# Patient Record
Sex: FEMALE | Race: BLACK OR AFRICAN AMERICAN | NOT HISPANIC OR LATINO | Employment: FULL TIME | URBAN - METROPOLITAN AREA
[De-identification: names, ages, dates, MRNs, and addresses within clinical notes are randomized per-mention and may not be internally consistent; named-entity substitution may affect disease eponyms.]

---

## 2017-12-05 DIAGNOSIS — I10 ESSENTIAL HYPERTENSION: ICD-10-CM

## 2017-12-05 DIAGNOSIS — E11.9 TYPE 2 DIABETES MELLITUS TREATED WITHOUT INSULIN: ICD-10-CM

## 2017-12-05 RX ORDER — METFORMIN HYDROCHLORIDE 500 MG/1
TABLET ORAL
Qty: 180 TABLET | Refills: 2 | OUTPATIENT
Start: 2017-12-05

## 2017-12-05 RX ORDER — AMLODIPINE BESYLATE 10 MG/1
TABLET ORAL
Qty: 90 TABLET | Refills: 2 | OUTPATIENT
Start: 2017-12-05

## 2017-12-13 ENCOUNTER — PATIENT MESSAGE (OUTPATIENT)
Dept: ADMINISTRATIVE | Facility: HOSPITAL | Age: 67
End: 2017-12-13

## 2017-12-28 DIAGNOSIS — Z12.11 COLON CANCER SCREENING: ICD-10-CM

## 2018-01-30 ENCOUNTER — OFFICE VISIT (OUTPATIENT)
Dept: OBSTETRICS AND GYNECOLOGY | Facility: CLINIC | Age: 68
End: 2018-01-30
Payer: COMMERCIAL

## 2018-01-30 VITALS
DIASTOLIC BLOOD PRESSURE: 84 MMHG | BODY MASS INDEX: 35.2 KG/M2 | HEIGHT: 66 IN | SYSTOLIC BLOOD PRESSURE: 150 MMHG | WEIGHT: 219 LBS

## 2018-01-30 DIAGNOSIS — N95.0 POSTMENOPAUSAL BLEEDING: ICD-10-CM

## 2018-01-30 DIAGNOSIS — Z80.3 FHX: BREAST CANCER IN FIRST DEGREE RELATIVE: ICD-10-CM

## 2018-01-30 DIAGNOSIS — E11.9 TYPE 2 DIABETES MELLITUS WITHOUT COMPLICATION, WITHOUT LONG-TERM CURRENT USE OF INSULIN: ICD-10-CM

## 2018-01-30 DIAGNOSIS — Z01.419 ENCOUNTER FOR GYNECOLOGICAL EXAMINATION WITHOUT ABNORMAL FINDING: Primary | ICD-10-CM

## 2018-01-30 DIAGNOSIS — Z12.39 BREAST CANCER SCREENING: ICD-10-CM

## 2018-01-30 DIAGNOSIS — N76.3 CHRONIC VULVITIS: ICD-10-CM

## 2018-01-30 DIAGNOSIS — I10 HYPERTENSION, UNSPECIFIED TYPE: ICD-10-CM

## 2018-01-30 DIAGNOSIS — Z78.0 POSTMENOPAUSAL: ICD-10-CM

## 2018-01-30 PROCEDURE — 88175 CYTOPATH C/V AUTO FLUID REDO: CPT

## 2018-01-30 PROCEDURE — 3008F BODY MASS INDEX DOCD: CPT | Mod: S$GLB,,, | Performed by: OBSTETRICS & GYNECOLOGY

## 2018-01-30 PROCEDURE — 99387 INIT PM E/M NEW PAT 65+ YRS: CPT | Mod: S$GLB,,, | Performed by: OBSTETRICS & GYNECOLOGY

## 2018-01-30 PROCEDURE — 99999 PR PBB SHADOW E&M-EST. PATIENT-LVL III: CPT | Mod: PBBFAC,,, | Performed by: OBSTETRICS & GYNECOLOGY

## 2018-01-30 RX ORDER — ESTRADIOL 0.1 MG/G
1 CREAM VAGINAL
Qty: 42.5 G | Refills: 3 | Status: SHIPPED | OUTPATIENT
Start: 2018-02-01 | End: 2018-08-09

## 2018-01-30 RX ORDER — LEVOTHYROXINE SODIUM 100 UG/1
TABLET ORAL
COMMUNITY
Start: 2018-01-22 | End: 2018-08-09

## 2018-01-30 NOTE — PROGRESS NOTES
"CC: Well woman exam    Skylar Valdez is a 67 y.o. female  presents for a well woman exam.  LMP: No LMP recorded. Patient is postmenopausal..  She went through menopause at age 58 and was doing well.  She started to have vaginal bleeding.  She is having some cramping and discomfort.    She has pain with intercourse and some discomfort.    Daughter diagnosed with breast cancer at age 33.  She had a recurrence of breast cancer    Past Medical History:   Diagnosis Date    Diabetes mellitus, type 2     Hyperlipidemia     Hypertension      Past Surgical History:   Procedure Laterality Date    TONSILLECTOMY       Social History     Social History    Marital status:      Spouse name: N/A    Number of children: N/A    Years of education: N/A     Social History Main Topics    Smoking status: Never Smoker    Smokeless tobacco: Never Used    Alcohol use No    Drug use: No    Sexual activity: No     Other Topics Concern    None     Social History Narrative    None     Family History   Problem Relation Age of Onset    Hypertension Mother     Hyperlipidemia Mother     Stroke Father     Lung disease Father     Cancer Sister      OB History      Para Term  AB Living    0 0 0 0 0 0    SAB TAB Ectopic Multiple Live Births    0 0 0 0 0          BP (!) 150/84 (BP Location: Right arm, Patient Position: Sitting, BP Method: Large (Manual))   Ht 5' 6" (1.676 m)   Wt 99.3 kg (219 lb 0.4 oz)   BMI 35.35 kg/m²       ROS:    ROS:  GENERAL: Denies weight gain or weight loss. Feeling well overall.   SKIN: Denies rash or lesions.   HEAD: Denies head injury or headache.   NODES: Denies enlarged lymph nodes.   CHEST: Denies chest pain or shortness of breath.   CARDIOVASCULAR: Denies palpitations or left sided chest pain.   ABDOMEN: No abdominal pain, constipation, diarrhea, nausea, vomiting or rectal bleeding.   URINARY: No frequency, dysuria, hematuria, or burning on urination.  REPRODUCTIVE: See " HPI.   BREASTS: The patient performs breast self-examination and denies pain, lumps, or nipple discharge.   HEMATOLOGIC: No easy bruisability or excessive bleeding.   MUSCULOSKELETAL: Denies joint pain or swelling.   NEUROLOGIC: Denies syncope or weakness.   PSYCHIATRIC: Denies depression, anxiety or mood swings.    PHYSICAL EXAM:    APPEARANCE: Well nourished, well developed, in no acute distress.  AFFECT: WNL, alert and oriented x 3  SKIN: No acne or hirsutism  NECK: Neck symmetric without masses or thyromegaly  NODES: No inguinal, cervical, axillary, or femoral lymph node enlargement  CHEST: Good respiratory effect  ABDOMEN: Soft.  No tenderness or masses.  No hepatosplenomegaly.  No hernias.  BREASTS: Symmetrical, no skin changes or visible lesions.  No palpable masses, nipple discharge bilaterally.  PELVIC: Normal external genitalia without lesions.  Normal hair distribution.  Adequate perineal body, normal urethral meatus.  Vagina moist and well rugated without lesions or discharge.  Cervix pink, blood coming from the cervix,  without lesions, discharge or tenderness.  No significant cystocele or rectocele.  Bimanual exam shows uterus to be normal size, regular, mobile and nontender.  Adnexa without masses or tenderness.    RECTAL: Rectovaginal exam confirms above with normal sphincter tone, no masses.  EXTREMITIES: No edema.    Diagnosis    ICD-10-CM ICD-9-CM    1. Encounter for gynecological examination without abnormal finding Z01.419 V72.31 Liquid-based pap smear, screening   2. Postmenopausal Z78.0 V49.81 US Pelvis Comp with Transvag NON-OB (xpd   3. Postmenopausal bleeding N95.0 627.1 US Pelvis Comp with Transvag NON-OB (xpd   4. Chronic vulvitis N76.3 616.10    5. Breast cancer screening Z12.31 V76.10 Mammo Digital Screening Bilat with Tomosynthesis_CAD   6. FHx: breast cancer in first degree relative Z80.3 V16.3    7. Hypertension, unspecified type I10 401.9    8. Type 2 diabetes mellitus without  complication, without long-term current use of insulin E11.9 250.00      Will need EMBx after US  Concerns with DM , HTN and PMB  Discussed possible fibroids and other causes of abnormal bleeding    Discussed possible genetic testing for daughter .  Spent 45 min in   Patient was counseled today on A.C.S. Pap guidelines and recommendations for yearly pelvic exams, mammograms and monthly self breast exams; to see her PCP for other health maintenance.   F/U for US and appt with me after/ with EMBx

## 2018-02-06 ENCOUNTER — HOSPITAL ENCOUNTER (OUTPATIENT)
Dept: RADIOLOGY | Facility: HOSPITAL | Age: 68
Discharge: HOME | End: 2018-02-06
Attending: OBSTETRICS & GYNECOLOGY
Payer: COMMERCIAL

## 2018-02-06 DIAGNOSIS — N95.0 POSTMENOPAUSAL BLEEDING: ICD-10-CM

## 2018-02-06 DIAGNOSIS — Z12.39 BREAST CANCER SCREENING: ICD-10-CM

## 2018-02-06 DIAGNOSIS — Z78.0 POSTMENOPAUSAL: ICD-10-CM

## 2018-02-06 PROCEDURE — 77063 BREAST TOMOSYNTHESIS BI: CPT | Mod: 26,,, | Performed by: RADIOLOGY

## 2018-02-06 PROCEDURE — 77067 SCR MAMMO BI INCL CAD: CPT | Mod: TC

## 2018-02-06 PROCEDURE — 77067 SCR MAMMO BI INCL CAD: CPT | Mod: 26,,, | Performed by: RADIOLOGY

## 2018-02-06 PROCEDURE — 76856 US EXAM PELVIC COMPLETE: CPT | Mod: 26,,, | Performed by: RADIOLOGY

## 2018-02-06 PROCEDURE — 76830 TRANSVAGINAL US NON-OB: CPT | Mod: TC

## 2018-02-06 PROCEDURE — 76830 TRANSVAGINAL US NON-OB: CPT | Mod: 26,,, | Performed by: RADIOLOGY

## 2018-02-07 ENCOUNTER — PATIENT MESSAGE (OUTPATIENT)
Dept: OBSTETRICS AND GYNECOLOGY | Facility: CLINIC | Age: 68
End: 2018-02-07

## 2018-02-07 ENCOUNTER — TELEPHONE (OUTPATIENT)
Dept: OBSTETRICS AND GYNECOLOGY | Facility: CLINIC | Age: 68
End: 2018-02-07

## 2018-02-07 NOTE — TELEPHONE ENCOUNTER
Possible polyp in the lining of the uterus seen on US.  Please come in for follow up to discuss options and removal.

## 2018-02-26 ENCOUNTER — TELEPHONE (OUTPATIENT)
Dept: OBSTETRICS AND GYNECOLOGY | Facility: CLINIC | Age: 68
End: 2018-02-26

## 2018-02-26 NOTE — TELEPHONE ENCOUNTER
----- Message from Hafsa Helms sent at 2/26/2018  3:07 PM CST -----  x_  1st Request  _  2nd Request  _  3rd Request        Who:  mariah    Why: pt. Returning phone call. Please call to discuss    What Number to Call Back:[phone]    When to Expect a call back: (Before the end of the day)   -- if the call is after 12:00, the call back will be tomorrow.

## 2018-02-26 NOTE — TELEPHONE ENCOUNTER
----- Message from Hafsa Helms sent at 2/26/2018 11:28 AM CST -----  _x  1st Request  _  2nd Request  _  3rd Request        Who: mariah    Why: pt. Would like to speak with dr. milton porter. Please call to discuss    What Number to Call Back:[phone]    When to Expect a call back: (Before the end of the day)   -- if the call is after 12:00, the call back will be tomorrow.

## 2018-03-02 RX ORDER — LOSARTAN POTASSIUM 100 MG/1
TABLET ORAL DAILY
COMMUNITY
Start: 2018-02-25 | End: 2018-11-27 | Stop reason: SDUPTHER

## 2018-03-02 RX ORDER — LEVOTHYROXINE SODIUM 112 UG/1
TABLET ORAL
COMMUNITY
Start: 2018-02-25 | End: 2018-08-09

## 2018-03-06 ENCOUNTER — OFFICE VISIT (OUTPATIENT)
Dept: OBSTETRICS AND GYNECOLOGY | Facility: CLINIC | Age: 68
End: 2018-03-06
Payer: COMMERCIAL

## 2018-03-06 VITALS
HEIGHT: 66 IN | SYSTOLIC BLOOD PRESSURE: 128 MMHG | BODY MASS INDEX: 35.26 KG/M2 | DIASTOLIC BLOOD PRESSURE: 76 MMHG | WEIGHT: 219.38 LBS

## 2018-03-06 DIAGNOSIS — N95.0 POSTMENOPAUSAL BLEEDING: ICD-10-CM

## 2018-03-06 DIAGNOSIS — D25.0 FIBROIDS, SUBMUCOSAL: Primary | ICD-10-CM

## 2018-03-06 PROCEDURE — 3074F SYST BP LT 130 MM HG: CPT | Mod: S$GLB,,, | Performed by: OBSTETRICS & GYNECOLOGY

## 2018-03-06 PROCEDURE — 99999 PR PBB SHADOW E&M-EST. PATIENT-LVL III: CPT | Mod: PBBFAC,,, | Performed by: OBSTETRICS & GYNECOLOGY

## 2018-03-06 PROCEDURE — 3078F DIAST BP <80 MM HG: CPT | Mod: S$GLB,,, | Performed by: OBSTETRICS & GYNECOLOGY

## 2018-03-06 PROCEDURE — 99214 OFFICE O/P EST MOD 30 MIN: CPT | Mod: S$GLB,,, | Performed by: OBSTETRICS & GYNECOLOGY

## 2018-03-06 RX ORDER — LINAGLIPTIN 5 MG/1
TABLET, FILM COATED ORAL DAILY
COMMUNITY
Start: 2018-02-26 | End: 2018-12-04

## 2018-03-06 NOTE — PROGRESS NOTES
"CC: PMB  Submucosal fibroid  Pelvic pain    Skylar Valdez is a 67 y.o. female  presents for follow up of US and PMB and pelvic pain      The uterus  measures  11.0 x 7.8 x 7.6 cm .  There are at least 3 intramural uterine fibroids, the largest near the fundus measures 4.2 cm.  There is a small echogenic area within the endometrial cavity could represent a submucosal fibroid or a small polyp measuring 1.7 cm.  Fluid is seen also in the endometrium cavity.  Small echogenic debris noted in the endometrium cavity. The bilateral ovaries are not seen, the bilateral adnexa appear normal.  The endometrium does not appear thickened.   The ovaries are not seen, the adnexa appear normal.  No free fluid.   Impression       1.  Small isoechoic mass within the endometrial cavity could represent a small polyp or a submucosal fibroid.  The endometrium cavity is distended with fluid which appears to delineate normal thickness endometrium.  Small amount of debris noted in the fluid within the endometrial cavity.  Further evaluation advised.  Followup ultrasound recommended to ensure resolution.         Past Medical History:   Diagnosis Date    Diabetes mellitus, type 2     Hyperlipidemia     Hypertension        Past Surgical History:   Procedure Laterality Date    TONSILLECTOMY         OB History    Para Term  AB Living   2 2 2 0 0 2   SAB TAB Ectopic Multiple Live Births   0 0 0 0 0      # Outcome Date GA Lbr [place]/2nd Weight Sex Delivery Anes PTL Lv   2 Term            1 Term                   Family History   Problem Relation Age of Onset    Hypertension Mother     Hyperlipidemia Mother     Stroke Father     Lung disease Father     Cancer Sister     Breast cancer Daughter        Social History   Substance Use Topics    Smoking status: Never Smoker    Smokeless tobacco: Never Used    Alcohol use No       /76   Ht 5' 6" (1.676 m)   Wt 99.5 kg (219 lb 5.7 oz)   LMP 2010   BMI 35.41 kg/m² "     ROS:  GENERAL: Denies weight gain or weight loss. Feeling well overall.   SKIN: Denies rash or lesions.   HEAD: Denies head injury or headache.   NODES: Denies enlarged lymph nodes.   CHEST: Denies chest pain or shortness of breath.   CARDIOVASCULAR: Denies palpitations or left sided chest pain.   ABDOMEN: No abdominal pain, constipation, diarrhea, nausea, vomiting or rectal bleeding.   URINARY: No frequency, dysuria, hematuria, or burning on urination.  REPRODUCTIVE: See HPI.   BREASTS: The patient performs breast self-examination and denies pain, lumps, or nipple discharge.   HEMATOLOGIC: No easy bruisability or excessive bleeding.  MUSCULOSKELETAL: Denies joint pain or swelling.   NEUROLOGIC: Denies syncope or weakness.   PSYCHIATRIC: Denies depression, anxiety or mood swings.    Physical Exam:    APPEARANCE: Well nourished, well developed, in no acute distress.  AFFECT: WNL, alert and oriented x 3  SKIN: No acne or hirsutism  NECK: Neck symmetric without masses or thyromegaly  NODES: No inguinal, cervical, axillary, or femoral lymph node enlargement      ASSESSMENT AND PLAN  1. Fibroids, submucosal     2. Postmenopausal bleeding         Patient was counseled today on     Discussed fibroid treatment options- Possible observation,  Vs D & C HYST FIBROID RESECTION VS myomectomy vs HYST. \.   Risks and benefits of fibroid procedures discussed.   With the D & C  HYST - it will be diagnostic as well as therapeutic.  Does not want invasive measures at this time    F/U for D  & C HYST fibroid resection  Spent 25 min in   Answers for HPI/ROS submitted by the patient on 3/5/2018   Vaginal bleeding  Chronicity: recurrent  Onset: 1 to 4 weeks ago  Progression since onset: waxing and waning  Pain severity: moderate  Affected side: both  Pregnant now?: No  abdominal pain: Yes  anorexia: No  back pain: Yes  chills: No  constipation: No  diarrhea: No  discolored urine: Yes  dysuria: No  fever: No  flank pain:  No  frequency: No  headaches: No  hematuria: Yes  nausea: No  painful intercourse: No  rash: No  urgency: Yes  vomiting: No  Vaginal bleeding: typical of menses  Passing clots?: No  Passing tissue?: No  Aggravated by: tactile pressure  treatments tried: nothing  Improvement on treatment: no relief  Sexual activity: not sexually active  Partner with STD symptoms: no  Birth control: nothing  Menstrual history: postmenopausal  STD: No  abdominal surgery: No   section: No  Ectopic pregnancy: No  Endometriosis: No  herpes simplex: No  gynecological surgery: No  menorrhagia: No  metrorrhagia: No  miscarriage: No  ovarian cysts: No  perineal abscess: No  PID: No  terminated pregnancy: No  vaginosis: No

## 2018-04-27 ENCOUNTER — PATIENT MESSAGE (OUTPATIENT)
Dept: INTERNAL MEDICINE | Facility: CLINIC | Age: 68
End: 2018-04-27

## 2018-07-03 ENCOUNTER — PATIENT OUTREACH (OUTPATIENT)
Dept: INTERNAL MEDICINE | Facility: CLINIC | Age: 68
End: 2018-07-03

## 2018-08-09 ENCOUNTER — OFFICE VISIT (OUTPATIENT)
Dept: OBSTETRICS AND GYNECOLOGY | Facility: CLINIC | Age: 68
End: 2018-08-09
Attending: OBSTETRICS & GYNECOLOGY
Payer: COMMERCIAL

## 2018-08-09 VITALS
SYSTOLIC BLOOD PRESSURE: 150 MMHG | BODY MASS INDEX: 31.74 KG/M2 | WEIGHT: 209.44 LBS | DIASTOLIC BLOOD PRESSURE: 80 MMHG | HEIGHT: 68 IN

## 2018-08-09 DIAGNOSIS — N95.0 PMB (POSTMENOPAUSAL BLEEDING): Primary | ICD-10-CM

## 2018-08-09 PROCEDURE — 99214 OFFICE O/P EST MOD 30 MIN: CPT | Mod: S$GLB,,, | Performed by: OBSTETRICS & GYNECOLOGY

## 2018-08-09 PROCEDURE — 3079F DIAST BP 80-89 MM HG: CPT | Mod: CPTII,S$GLB,, | Performed by: OBSTETRICS & GYNECOLOGY

## 2018-08-09 PROCEDURE — 3077F SYST BP >= 140 MM HG: CPT | Mod: CPTII,S$GLB,, | Performed by: OBSTETRICS & GYNECOLOGY

## 2018-08-09 PROCEDURE — 99999 PR PBB SHADOW E&M-EST. PATIENT-LVL III: CPT | Mod: PBBFAC,,, | Performed by: OBSTETRICS & GYNECOLOGY

## 2018-08-09 RX ORDER — LEVOTHYROXINE SODIUM 125 UG/1
TABLET ORAL
COMMUNITY
Start: 2018-07-31 | End: 2018-12-13 | Stop reason: SDUPTHER

## 2018-08-09 RX ORDER — SODIUM CHLORIDE 9 MG/ML
INJECTION, SOLUTION INTRAVENOUS CONTINUOUS
Status: CANCELLED | OUTPATIENT
Start: 2018-08-09

## 2018-08-09 NOTE — PROGRESS NOTES
CC: Postmenopausal bleeding    Skylar Valdez is a 68 y.o. female  presents for complaint of postmenopausal bleeding.      She reports menopause at 58 years old. She reports she has bleeding monthly for the last year.  She bleeds for 5 days.  She reports the bleeding is heavy.      Ultrasound shows:    The uterus  measures  11.0 x 7.8 x 7.6 cm .  There are at least 3 intramural uterine fibroids, the largest near the fundus measures 4.2 cm.  There is a small echogenic area within the endometrial cavity could represent a submucosal fibroid or a small polyp measuring 1.7 cm.  Fluid is seen also in the endometrium cavity.  Small echogenic debris noted in the endometrium cavity. The bilateral ovaries are not seen, the bilateral adnexa appear normal.  The endometrium does not appear thickened.   The ovaries are not seen, the adnexa appear normal.  No free fluid.      Impression       1.  Small isoechoic mass within the endometrial cavity could represent a small polyp or a submucosal fibroid.  The endometrium cavity is distended with fluid which appears to delineate normal thickness endometrium.  Small amount of debris noted in the fluid within the endometrial cavity.  Further evaluation advised.  Followup ultrasound recommended to ensure resolution.            Past Medical History:   Diagnosis Date    Diabetes mellitus, type 2     Hyperlipidemia     Hypertension        Past Surgical History:   Procedure Laterality Date    TONSILLECTOMY         OB History    Para Term  AB Living   2 2 2 0 0 2   SAB TAB Ectopic Multiple Live Births   0 0 0 0 0      # Outcome Date GA Lbr [place]/2nd Weight Sex Delivery Anes PTL Lv   2 Term      Vag-Spont      1 Term      Vag-Spont             Family History   Problem Relation Age of Onset    Hypertension Mother     Hyperlipidemia Mother     Stroke Father     Lung disease Father     Cancer Sister     Breast cancer Daughter     Ovarian cancer Neg Hx     Colon cancer  "Neg Hx        Social History   Substance Use Topics    Smoking status: Never Smoker    Smokeless tobacco: Never Used    Alcohol use No       BP (!) 150/80   Ht 5' 8" (1.727 m)   Wt 95 kg (209 lb 7 oz)   LMP 06/01/2010   BMI 31.84 kg/m²     ROS:  GENERAL: Denies weight gain or weight loss. Feeling well overall.   SKIN: Denies rash or lesions.   HEAD: Denies head injury or headache.   NODES: Denies enlarged lymph nodes.   CHEST: Denies chest pain or shortness of breath.   CARDIOVASCULAR: Denies palpitations or left sided chest pain.   ABDOMEN: No abdominal pain, constipation, diarrhea, nausea, vomiting or rectal bleeding.   URINARY: No frequency, dysuria, hematuria, or burning on urination.  REPRODUCTIVE: See HPI.   BREASTS: The patient performs breast self-examination and denies pain, lumps, or nipple discharge.   HEMATOLOGIC: No easy bruisability or excessive bleeding.  MUSCULOSKELETAL: Denies joint pain or swelling.   NEUROLOGIC: Denies syncope or weakness.   PSYCHIATRIC: Denies depression, anxiety or mood swings.    Physical Exam:    APPEARANCE: Well nourished, well developed, in no acute distress.  AFFECT: WNL, alert and oriented x 3  SKIN: No acne or hirsutism  NECK: Neck symmetric without masses or thyromegaly  NODES: No inguinal, cervical, axillary, or femoral lymph node enlargement  CHEST: Good respiratory effect  ABDOMEN: Soft.  No tenderness or masses.  No hepatosplenomegaly.  No hernias.  BREASTS: Symmetrical, no skin changes or visible lesions.  No palpable masses, nipple discharge bilaterally.  PELVIC: Normal external genitalia without lesions.  Normal hair distribution.  Adequate perineal body, normal urethral meatus.  Vagina moist and well rugated without lesions or discharge.  Cervix pink, without lesions, discharge or tenderness.  No significant cystocele or rectocele.  Bimanual exam shows uterus to be normal size but difficult to assess due to body habitus, regular, mobile and nontender.  " Adnexa without masses or tenderness.    EXTREMITIES: No edema.    ASSESSMENT AND PLAN  1. PMB (postmenopausal bleeding)  Vital signs    Notify Physician/Vital Signs Parameters Urine output less than 0.5mL/kg/hr (with indwelling catheter) or 30 mL/hr (without indwelling catheter) or blood glucose greater than 200 mg/dL    Notify physician    Notify Physician - Potential Need of Opioid Reversal    Chlorohexidine Gluconate Bath    Case Request Operating Room: HYSTEROSCOPY, WITH DILATION AND CURETTAGE OF UTERUS    Place in Outpatient    Diet NPO    Place sequential compression device    Notify Physician - Potential Need of Opioid Reversal       Patient was counseled today on possible causes of PMB and treatment options - endometrial biopsy but would most likely continue to have bleeding due to polyp; D&C hysteroscopy; endometrial biopsy and hysterectomy if biopsy is negative.  Patient desires to proceed with D&C hysteroscopy.  Surgery scheduled 9/18    Follow-up in about 2 weeks (around 8/23/2018).

## 2018-09-17 ENCOUNTER — HOSPITAL ENCOUNTER (OUTPATIENT)
Dept: PREADMISSION TESTING | Facility: OTHER | Age: 68
Discharge: HOME | End: 2018-09-17
Attending: OBSTETRICS & GYNECOLOGY
Payer: COMMERCIAL

## 2018-09-17 ENCOUNTER — ANESTHESIA EVENT (OUTPATIENT)
Dept: SURGERY | Facility: OTHER | Age: 68
End: 2018-09-17
Payer: COMMERCIAL

## 2018-09-17 ENCOUNTER — OFFICE VISIT (OUTPATIENT)
Dept: OBSTETRICS AND GYNECOLOGY | Facility: CLINIC | Age: 68
End: 2018-09-17
Attending: OBSTETRICS & GYNECOLOGY
Payer: COMMERCIAL

## 2018-09-17 VITALS
HEIGHT: 67 IN | DIASTOLIC BLOOD PRESSURE: 65 MMHG | WEIGHT: 205 LBS | OXYGEN SATURATION: 98 % | SYSTOLIC BLOOD PRESSURE: 147 MMHG | BODY MASS INDEX: 32.18 KG/M2 | HEART RATE: 80 BPM | TEMPERATURE: 97 F

## 2018-09-17 VITALS
DIASTOLIC BLOOD PRESSURE: 80 MMHG | WEIGHT: 209.44 LBS | BODY MASS INDEX: 31.74 KG/M2 | HEIGHT: 68 IN | SYSTOLIC BLOOD PRESSURE: 140 MMHG

## 2018-09-17 DIAGNOSIS — N95.0 PMB (POSTMENOPAUSAL BLEEDING): ICD-10-CM

## 2018-09-17 DIAGNOSIS — Z01.818 PREOPERATIVE EXAM FOR GYNECOLOGIC SURGERY: Primary | ICD-10-CM

## 2018-09-17 LAB
ANION GAP SERPL CALC-SCNC: 8 MMOL/L
BASOPHILS # BLD AUTO: 0.02 K/UL
BASOPHILS NFR BLD: 0.4 %
BUN SERPL-MCNC: 10 MG/DL
CALCIUM SERPL-MCNC: 9.7 MG/DL
CHLORIDE SERPL-SCNC: 106 MMOL/L
CO2 SERPL-SCNC: 27 MMOL/L
CREAT SERPL-MCNC: 0.7 MG/DL
DIFFERENTIAL METHOD: ABNORMAL
EOSINOPHIL # BLD AUTO: 0.3 K/UL
EOSINOPHIL NFR BLD: 5.1 %
ERYTHROCYTE [DISTWIDTH] IN BLOOD BY AUTOMATED COUNT: 13.4 %
EST. GFR  (AFRICAN AMERICAN): >60 ML/MIN/1.73 M^2
EST. GFR  (NON AFRICAN AMERICAN): >60 ML/MIN/1.73 M^2
GLUCOSE SERPL-MCNC: 104 MG/DL
HCT VFR BLD AUTO: 42.1 %
HGB BLD-MCNC: 13.4 G/DL
LYMPHOCYTES # BLD AUTO: 1.8 K/UL
LYMPHOCYTES NFR BLD: 36.3 %
MCH RBC QN AUTO: 27.1 PG
MCHC RBC AUTO-ENTMCNC: 31.8 G/DL
MCV RBC AUTO: 85 FL
MONOCYTES # BLD AUTO: 0.5 K/UL
MONOCYTES NFR BLD: 10.7 %
NEUTROPHILS # BLD AUTO: 2.3 K/UL
NEUTROPHILS NFR BLD: 47.5 %
PLATELET # BLD AUTO: 267 K/UL
PMV BLD AUTO: 10.6 FL
POTASSIUM SERPL-SCNC: 4 MMOL/L
RBC # BLD AUTO: 4.95 M/UL
SODIUM SERPL-SCNC: 141 MMOL/L
WBC # BLD AUTO: 4.88 K/UL

## 2018-09-17 PROCEDURE — 99999 PR PBB SHADOW E&M-EST. PATIENT-LVL II: CPT | Mod: PBBFAC,,, | Performed by: OBSTETRICS & GYNECOLOGY

## 2018-09-17 PROCEDURE — 80048 BASIC METABOLIC PNL TOTAL CA: CPT

## 2018-09-17 PROCEDURE — 36415 COLL VENOUS BLD VENIPUNCTURE: CPT

## 2018-09-17 PROCEDURE — 85025 COMPLETE CBC W/AUTO DIFF WBC: CPT

## 2018-09-17 PROCEDURE — 99499 UNLISTED E&M SERVICE: CPT | Mod: S$GLB,,, | Performed by: OBSTETRICS & GYNECOLOGY

## 2018-09-17 RX ORDER — SODIUM CHLORIDE, SODIUM LACTATE, POTASSIUM CHLORIDE, CALCIUM CHLORIDE 600; 310; 30; 20 MG/100ML; MG/100ML; MG/100ML; MG/100ML
INJECTION, SOLUTION INTRAVENOUS CONTINUOUS
Status: CANCELLED | OUTPATIENT
Start: 2018-09-17

## 2018-09-17 NOTE — ANESTHESIA PREPROCEDURE EVALUATION
09/17/2018  Skylar Valdez is a 68 y.o., female.    Anesthesia Evaluation    I have reviewed the Patient Summary Reports.    I have reviewed the Nursing Notes.   I have reviewed the Medications.     Review of Systems  Anesthesia Hx:  No problems with previous Anesthesia  Denies Family Hx of Anesthesia complications.   Denies Personal Hx of Anesthesia complications.   Social:  Non-Smoker    Hematology/Oncology:  Hematology Normal   Oncology Normal     EENT/Dental:EENT/Dental Normal   Cardiovascular:   Exercise tolerance: good Hypertension, well controlled    Pulmonary:  Pulmonary Normal    Renal/:  Renal/ Normal     Musculoskeletal:  Musculoskeletal Normal    Neurological:  Neurology Normal    Endocrine:   Diabetes, well controlled, type 2    Dermatological:  Skin Normal    Psych:  Psychiatric Normal           Physical Exam  General:  Well nourished    Airway/Jaw/Neck:  Airway Findings: Mouth Opening: Normal Tongue: Normal  General Airway Assessment: Adult  Mallampati: I  TM Distance: Normal, at least 6 cm  Jaw/Neck Findings:  Neck ROM: Normal ROM      Dental:  Dental Findings: In tact             Anesthesia Plan  Type of Anesthesia, risks & benefits discussed:  Anesthesia Type:  general  Patient's Preference:   Intra-op Monitoring Plan:   Intra-op Monitoring Plan Comments:   Post Op Pain Control Plan:   Post Op Pain Control Plan Comments:   Induction:   IV  Beta Blocker:         Informed Consent: Patient understands risks and agrees with Anesthesia plan.  Questions answered. Anesthesia consent signed with patient.  ASA Score: 3     Day of Surgery Review of History & Physical:    H&P update referred to the surgeon.         Ready For Surgery From Anesthesia Perspective.

## 2018-09-17 NOTE — DISCHARGE INSTRUCTIONS
PRE-ADMIT TESTING -  [phone]    [address]  [place] [facility]          Your surgery has been scheduled at [facility]. We are pleased to have the opportunity to serve you. For Further Information please call [phone].    On the day of surgery please report to the Information Desk on the 1st floor.    · CONTACT YOUR PHYSICIAN'S OFFICE THE DAY PRIOR TO YOUR SURGERY TO OBTAIN YOUR ARRIVAL TIME.     · The evening before surgery do not eat anything after 9 p.m. ( this includes hard candy, chewing gum and mints).  You may only have GATORADE, POWERADE AND WATER  from 9 p.m. until you leave your home.   DO NOT DRINK ANY LIQUIDS ON THE WAY TO THE HOSPITAL.      SPECIAL MEDICATION INSTRUCTIONS: TAKE medications checked off by the Anesthesiologist on your Medication List.    Angiogram Patients: Take medications as instructed by your physician, including aspirin.     Surgery Patients:    If you take ASPIRIN - Your PHYSICIAN/SURGEON will need to inform you IF/OR when you need to stop taking aspirin prior to your surgery.     Do Not take any medications containing IBUPROFEN.  Do Not Wear any make-up or dark nail polish   (especially eye make-up) to surgery. If you come to surgery with makeup on you will be required to remove the makeup or nail polish.    Do not shave your surgical area at least 5 days prior to your surgery. The surgical prep will be performed at the hospital according to Infection Control regulations.    Leave all valuables at home.   Do Not wear any jewelry or watches, including any metal in body piercings.  Contact Lens must be removed before surgery. Either do not wear the contact lens or bring a case and solution for storage.  Please bring a container for eyeglasses or dentures as required.  Bring any paperwork your physician has provided, such as consent forms,  history and physicals, doctor's orders, etc.   Bring comfortable clothes that are loose fitting to wear upon  discharge. Take into consideration the type of surgery being performed.  Maintain your diet as advised per your physician the day prior to surgery.      Adequate rest the night before surgery is advised.   Park in the Parking lot behind the hospital or in the [facility] across the street from the parking lot. Parking is complimentary.  If you will be discharged the same day as your procedure, please arrange for a responsible adult to drive you home or to accompany you if traveling by taxi.   YOU WILL NOT BE PERMITTED TO DRIVE OR TO LEAVE THE HOSPITAL ALONE AFTER SURGERY.   It is strongly recommended that you arrange for someone to remain with you for the first 24 hrs following your surgery.       Thank you for your cooperation.  The Staff of [facility].        Bathing Instructions                                                                 Please shower the evening before and morning of your procedure with    ANTIBACTERIAL SOAP. ( DIAL, etc )  Concentrate on the surgical area   for at least 3 minutes and rinse completely. Dry off as usual.   Do not use any deodorant, powder, body lotions, perfume, after shave or    cologne.

## 2018-09-17 NOTE — H&P (VIEW-ONLY)
"CC: Preop exam    Skylar Valdez is a 68 y.o. female  presents for a pre-op exam for a D&C hysteroscopy scheduled on .      She reports menopause at 58 years old. She reports she has bleeding monthly for the last year.  She bleeds for 5 days.  She reports the bleeding is heavy.       Ultrasound shows:          The uterus  measures  11.0 x 7.8 x 7.6 cm .  There are at least 3 intramural uterine fibroids, the largest near the fundus measures 4.2 cm.  There is a small echogenic area within the endometrial cavity could represent a submucosal fibroid or a small polyp measuring 1.7 cm.  Fluid is seen also in the endometrium cavity.  Small echogenic debris noted in the endometrium cavity. The bilateral ovaries are not seen, the bilateral adnexa appear normal.  The endometrium does not appear thickened.   The ovaries are not seen, the adnexa appear normal.  No free fluid.         Past Medical History:   Diagnosis Date    Diabetes mellitus, type 2     Hyperlipidemia     Hypertension     Thyroid disease        Past Surgical History:   Procedure Laterality Date    TONSILLECTOMY         OB History    Para Term  AB Living   2 2 2 0 0 2   SAB TAB Ectopic Multiple Live Births   0 0 0 0 0      # Outcome Date GA Lbr [place]/2nd Weight Sex Delivery Anes PTL Lv   2 Term      Vag-Spont      1 Term      Vag-Spont             Family History   Problem Relation Age of Onset    Hypertension Mother     Hyperlipidemia Mother     Stroke Father     Lung disease Father     Cancer Sister     Breast cancer Daughter     Ovarian cancer Neg Hx     Colon cancer Neg Hx        Social History     Tobacco Use    Smoking status: Never Smoker    Smokeless tobacco: Never Used   Substance Use Topics    Alcohol use: No    Drug use: No       BP (!) 140/80   Ht 5' 8" (1.727 m)   Wt 95 kg (209 lb 7 oz)   LMP 2010   BMI 31.84 kg/m²     ROS:  GENERAL: Denies weight gain or weight loss. Feeling " well overall.   SKIN: Denies rash or lesions.   HEAD: Denies head injury or headache.   NODES: Denies enlarged lymph nodes.   CHEST: Denies chest pain or shortness of breath.   CARDIOVASCULAR: Denies palpitations or left sided chest pain.   ABDOMEN: No abdominal pain, constipation, diarrhea, nausea, vomiting or rectal bleeding.   URINARY: No frequency, dysuria, hematuria, or burning on urination.  REPRODUCTIVE: See HPI.   HEMATOLOGIC: No easy bruisability or excessive bleeding with the exception of menstrual cycles.  MUSCULOSKELETAL: Denies joint pain or swelling.   NEUROLOGIC: Denies syncope or weakness.   PSYCHIATRIC: Denies depression, anxiety or mood swings.    PHYSICAL EXAM:  APPEARANCE: Well nourished, well developed, in no acute distress.  AFFECT: WNL, alert and oriented x 3  SKIN: No acne or hirsutism  NECK: Neck symmetric without masses or thyromegaly  NODES: No inguinal, cervical, axillary, or femoral lymph node enlargement  CHEST: Good respiratory effect  ABDOMEN: Soft.  No tenderness or masses.  No hepatosplenomegaly.  No hernias.  PELVIC: Deferred  EXTREMITIES: No edema.      ICD-10-CM ICD-9-CM    1. Preoperative exam for gynecologic surgery Z01.818 V72.83    2. PMB (postmenopausal bleeding) N95.0 627.1        Patient was counseled today on possible causes of PMB and treatment options - endometrial biopsy but would most likely continue to have bleeding due to polyp; D&C hysteroscopy; endometrial biopsy and hysterectomy if biopsy is negative.  Patient desires to proceed with D&C hysteroscopy.  Surgery scheduled 9/18    I have discussed the risks, benefits, indications, and alternatives of the procedure in detail.  The patient verbalizes her understanding.  All questions answered.  Consents signed.  The patient agrees to proceed to proceed as planned.

## 2018-09-18 ENCOUNTER — HOSPITAL ENCOUNTER (OUTPATIENT)
Facility: OTHER | Age: 68
Discharge: HOME | End: 2018-09-18
Attending: OBSTETRICS & GYNECOLOGY | Admitting: OBSTETRICS & GYNECOLOGY
Payer: COMMERCIAL

## 2018-09-18 ENCOUNTER — ANESTHESIA (OUTPATIENT)
Dept: SURGERY | Facility: OTHER | Age: 68
End: 2018-09-18
Payer: COMMERCIAL

## 2018-09-18 VITALS
HEART RATE: 72 BPM | WEIGHT: 205 LBS | DIASTOLIC BLOOD PRESSURE: 72 MMHG | OXYGEN SATURATION: 98 % | SYSTOLIC BLOOD PRESSURE: 165 MMHG | BODY MASS INDEX: 32.18 KG/M2 | HEIGHT: 67 IN | TEMPERATURE: 98 F | RESPIRATION RATE: 16 BRPM

## 2018-09-18 DIAGNOSIS — N95.0 PMB (POSTMENOPAUSAL BLEEDING): ICD-10-CM

## 2018-09-18 DIAGNOSIS — Z98.890 HISTORY OF HYSTEROSCOPY: Primary | ICD-10-CM

## 2018-09-18 LAB — POCT GLUCOSE: 101 MG/DL (ref 70–110)

## 2018-09-18 PROCEDURE — 71000033 HC RECOVERY, INTIAL HOUR: Performed by: OBSTETRICS & GYNECOLOGY

## 2018-09-18 PROCEDURE — 88305 TISSUE EXAM BY PATHOLOGIST: CPT | Mod: 26,,, | Performed by: PATHOLOGY

## 2018-09-18 PROCEDURE — 37000009 HC ANESTHESIA EA ADD 15 MINS: Performed by: OBSTETRICS & GYNECOLOGY

## 2018-09-18 PROCEDURE — 36000707: Performed by: OBSTETRICS & GYNECOLOGY

## 2018-09-18 PROCEDURE — 71000039 HC RECOVERY, EACH ADD'L HOUR: Performed by: OBSTETRICS & GYNECOLOGY

## 2018-09-18 PROCEDURE — 88305 TISSUE EXAM BY PATHOLOGIST: CPT | Performed by: PATHOLOGY

## 2018-09-18 PROCEDURE — 71000015 HC POSTOP RECOV 1ST HR: Performed by: OBSTETRICS & GYNECOLOGY

## 2018-09-18 PROCEDURE — 37000008 HC ANESTHESIA 1ST 15 MINUTES: Performed by: OBSTETRICS & GYNECOLOGY

## 2018-09-18 PROCEDURE — 63600175 PHARM REV CODE 636 W HCPCS: Performed by: NURSE ANESTHETIST, CERTIFIED REGISTERED

## 2018-09-18 PROCEDURE — 36000706: Performed by: OBSTETRICS & GYNECOLOGY

## 2018-09-18 PROCEDURE — 25000003 PHARM REV CODE 250: Performed by: NURSE ANESTHETIST, CERTIFIED REGISTERED

## 2018-09-18 PROCEDURE — 27201423 OPTIME MED/SURG SUP & DEVICES STERILE SUPPLY: Performed by: OBSTETRICS & GYNECOLOGY

## 2018-09-18 PROCEDURE — 25000003 PHARM REV CODE 250: Performed by: ANESTHESIOLOGY

## 2018-09-18 PROCEDURE — 63600175 PHARM REV CODE 636 W HCPCS: Performed by: ANESTHESIOLOGY

## 2018-09-18 PROCEDURE — 88342 IMHCHEM/IMCYTCHM 1ST ANTB: CPT | Performed by: PATHOLOGY

## 2018-09-18 PROCEDURE — 58558 HYSTEROSCOPY BIOPSY: CPT | Mod: ,,, | Performed by: OBSTETRICS & GYNECOLOGY

## 2018-09-18 PROCEDURE — C1782 MORCELLATOR: HCPCS | Performed by: OBSTETRICS & GYNECOLOGY

## 2018-09-18 PROCEDURE — 88341 IMHCHEM/IMCYTCHM EA ADD ANTB: CPT | Mod: 26,,, | Performed by: PATHOLOGY

## 2018-09-18 PROCEDURE — 88342 IMHCHEM/IMCYTCHM 1ST ANTB: CPT | Mod: 26,,, | Performed by: PATHOLOGY

## 2018-09-18 RX ORDER — ONDANSETRON 2 MG/ML
4 INJECTION INTRAMUSCULAR; INTRAVENOUS DAILY PRN
Status: DISCONTINUED | OUTPATIENT
Start: 2018-09-18 | End: 2018-09-18 | Stop reason: HOSPADM

## 2018-09-18 RX ORDER — FENTANYL CITRATE 50 UG/ML
INJECTION, SOLUTION INTRAMUSCULAR; INTRAVENOUS
Status: DISCONTINUED | OUTPATIENT
Start: 2018-09-18 | End: 2018-09-18

## 2018-09-18 RX ORDER — GLYCOPYRROLATE 0.2 MG/ML
INJECTION INTRAMUSCULAR; INTRAVENOUS
Status: DISCONTINUED | OUTPATIENT
Start: 2018-09-18 | End: 2018-09-18

## 2018-09-18 RX ORDER — MEPERIDINE HYDROCHLORIDE 50 MG/ML
12.5 INJECTION INTRAMUSCULAR; INTRAVENOUS; SUBCUTANEOUS ONCE AS NEEDED
Status: DISCONTINUED | OUTPATIENT
Start: 2018-09-18 | End: 2018-09-18 | Stop reason: HOSPADM

## 2018-09-18 RX ORDER — IBUPROFEN 400 MG/1
400 TABLET ORAL EVERY 4 HOURS PRN
Qty: 20 TABLET | Refills: 1 | Status: CANCELLED | OUTPATIENT
Start: 2018-09-18

## 2018-09-18 RX ORDER — DEXAMETHASONE SODIUM PHOSPHATE 4 MG/ML
INJECTION, SOLUTION INTRA-ARTICULAR; INTRALESIONAL; INTRAMUSCULAR; INTRAVENOUS; SOFT TISSUE
Status: DISCONTINUED | OUTPATIENT
Start: 2018-09-18 | End: 2018-09-18

## 2018-09-18 RX ORDER — DIPHENHYDRAMINE HYDROCHLORIDE 50 MG/ML
25 INJECTION INTRAMUSCULAR; INTRAVENOUS EVERY 6 HOURS PRN
Status: DISCONTINUED | OUTPATIENT
Start: 2018-09-18 | End: 2018-09-18 | Stop reason: HOSPADM

## 2018-09-18 RX ORDER — SODIUM CHLORIDE 9 MG/ML
INJECTION, SOLUTION INTRAVENOUS CONTINUOUS
Status: DISCONTINUED | OUTPATIENT
Start: 2018-09-18 | End: 2018-09-18 | Stop reason: HOSPADM

## 2018-09-18 RX ORDER — IBUPROFEN 400 MG/1
400 TABLET ORAL EVERY 4 HOURS PRN
Qty: 20 TABLET | Refills: 1 | Status: SHIPPED | OUTPATIENT
Start: 2018-09-18 | End: 2018-11-01 | Stop reason: CLARIF

## 2018-09-18 RX ORDER — FENTANYL CITRATE 50 UG/ML
25 INJECTION, SOLUTION INTRAMUSCULAR; INTRAVENOUS EVERY 5 MIN PRN
Status: DISCONTINUED | OUTPATIENT
Start: 2018-09-18 | End: 2018-09-18 | Stop reason: HOSPADM

## 2018-09-18 RX ORDER — HYDROCODONE BITARTRATE AND ACETAMINOPHEN 5; 325 MG/1; MG/1
1 TABLET ORAL EVERY 6 HOURS PRN
Qty: 5 TABLET | Refills: 0 | Status: SHIPPED | OUTPATIENT
Start: 2018-09-18 | End: 2018-11-01 | Stop reason: CLARIF

## 2018-09-18 RX ORDER — ACETAMINOPHEN 10 MG/ML
INJECTION, SOLUTION INTRAVENOUS
Status: DISCONTINUED | OUTPATIENT
Start: 2018-09-18 | End: 2018-09-18

## 2018-09-18 RX ORDER — SODIUM CHLORIDE 0.9 % (FLUSH) 0.9 %
3 SYRINGE (ML) INJECTION
Status: DISCONTINUED | OUTPATIENT
Start: 2018-09-18 | End: 2018-09-18 | Stop reason: HOSPADM

## 2018-09-18 RX ORDER — SODIUM CHLORIDE, SODIUM LACTATE, POTASSIUM CHLORIDE, CALCIUM CHLORIDE 600; 310; 30; 20 MG/100ML; MG/100ML; MG/100ML; MG/100ML
INJECTION, SOLUTION INTRAVENOUS CONTINUOUS
Status: DISCONTINUED | OUTPATIENT
Start: 2018-09-18 | End: 2018-09-18 | Stop reason: HOSPADM

## 2018-09-18 RX ORDER — OXYCODONE HYDROCHLORIDE 5 MG/1
5 TABLET ORAL
Status: DISCONTINUED | OUTPATIENT
Start: 2018-09-18 | End: 2018-09-18 | Stop reason: HOSPADM

## 2018-09-18 RX ORDER — LIDOCAINE HCL/PF 100 MG/5ML
SYRINGE (ML) INTRAVENOUS
Status: DISCONTINUED | OUTPATIENT
Start: 2018-09-18 | End: 2018-09-18

## 2018-09-18 RX ORDER — ONDANSETRON 2 MG/ML
INJECTION INTRAMUSCULAR; INTRAVENOUS
Status: DISCONTINUED | OUTPATIENT
Start: 2018-09-18 | End: 2018-09-18

## 2018-09-18 RX ORDER — PROPOFOL 10 MG/ML
VIAL (ML) INTRAVENOUS
Status: DISCONTINUED | OUTPATIENT
Start: 2018-09-18 | End: 2018-09-18

## 2018-09-18 RX ORDER — HYDROMORPHONE HYDROCHLORIDE 2 MG/ML
0.4 INJECTION, SOLUTION INTRAMUSCULAR; INTRAVENOUS; SUBCUTANEOUS EVERY 5 MIN PRN
Status: DISCONTINUED | OUTPATIENT
Start: 2018-09-18 | End: 2018-09-18 | Stop reason: HOSPADM

## 2018-09-18 RX ADMIN — OXYCODONE HYDROCHLORIDE 5 MG: 5 TABLET ORAL at 12:09

## 2018-09-18 RX ADMIN — LIDOCAINE HYDROCHLORIDE 80 MG: 20 INJECTION, SOLUTION INTRAVENOUS at 11:09

## 2018-09-18 RX ADMIN — FENTANYL CITRATE 25 MCG: 50 INJECTION INTRAMUSCULAR; INTRAVENOUS at 12:09

## 2018-09-18 RX ADMIN — DEXAMETHASONE SODIUM PHOSPHATE 4 MG: 4 INJECTION, SOLUTION INTRAMUSCULAR; INTRAVENOUS at 11:09

## 2018-09-18 RX ADMIN — CARBOXYMETHYLCELLULOSE SODIUM 2 DROP: 2.5 SOLUTION/ DROPS OPHTHALMIC at 11:09

## 2018-09-18 RX ADMIN — ACETAMINOPHEN 1000 MG: 10 INJECTION, SOLUTION INTRAVENOUS at 11:09

## 2018-09-18 RX ADMIN — SODIUM CHLORIDE, SODIUM LACTATE, POTASSIUM CHLORIDE, AND CALCIUM CHLORIDE: 600; 310; 30; 20 INJECTION, SOLUTION INTRAVENOUS at 10:09

## 2018-09-18 RX ADMIN — PROPOFOL 200 MG: 10 INJECTION, EMULSION INTRAVENOUS at 11:09

## 2018-09-18 RX ADMIN — FENTANYL CITRATE 100 MCG: 50 INJECTION, SOLUTION INTRAMUSCULAR; INTRAVENOUS at 11:09

## 2018-09-18 RX ADMIN — ONDANSETRON 4 MG: 2 INJECTION INTRAMUSCULAR; INTRAVENOUS at 11:09

## 2018-09-18 RX ADMIN — SODIUM CHLORIDE, SODIUM LACTATE, POTASSIUM CHLORIDE, AND CALCIUM CHLORIDE: 600; 310; 30; 20 INJECTION, SOLUTION INTRAVENOUS at 11:09

## 2018-09-18 RX ADMIN — GLYCOPYRROLATE 0.2 MG: 0.2 INJECTION, SOLUTION INTRAMUSCULAR; INTRAVENOUS at 11:09

## 2018-09-18 NOTE — OR NURSING
Skylar Valdez has met all discharge criteria from Phase II. Vital Signs are stable, ambulating  without difficulty. Discharge instructions given, patient verbalized understanding. Discharged from facility via wheelchair in stable condition.

## 2018-09-18 NOTE — PLAN OF CARE
Problem: Pain, Acute (Adult)  Goal: Acceptable Pain Control/Comfort Level  Patient will demonstrate the desired outcomes by discharge/transition of care.  Outcome: Outcome(s) achieved Date Met: 09/18/18  Skylar Valdez has met all discharge criteria from Phase II. Vital Signs are stable, ambulating  without difficulty.Pain is now under control and tolerable for the pt. Pain score 4 at this time.  Discharge instructions given, patient verbalized understanding. Discharged from facility via wheelchair in stable condition.

## 2018-09-18 NOTE — DISCHARGE SUMMARY
[facility]  Brief Operative Note     SUMMARY     Surgery Date: 9/18/2018     Surgeon(s) and Role:     * Lacie Henley MD - Primary    Assisting Surgeon: None    Pre-op Diagnosis:  PMB (postmenopausal bleeding) [N95.0]    Post-op Diagnosis:  Post-Op Diagnosis Codes:     * PMB (postmenopausal bleeding) [N95.0]    Procedure(s) (LRB):  HYSTEROSCOPY, WITH DILATION AND CURETTAGE OF UTERUS (N/A)    Anesthesia: General    Description of the findings of the procedure:   1. Normal appearing external female genitalia  2. Normal appearing vaginal and cervical mucosa, free of lesions.  3. Single-toothed tenaculum applied to the anterior lip of the cervix  4. Uterus sounded to approx 12 cm  5. Endometrial curettage with #1 curette; scrapings sent to pathology.  6. Hysteroscopically, normal endometrium with large polyp, removed with curette and sent to pathology  7. Tenaculum removed, tenaculum puncture sites with mild bleeding well-controlled after applying pressure with the sponge stick.    Estimated Blood Loss: none         Specimens:   Specimen (12h ago, onward)    Start     Ordered    09/18/18 1156  Specimen to Pathology - Surgery  Once     Comments:  1.  Endometrial scrapings2.  Endometrial scrapings     Start Status   09/18/18 1156 Collected (09/18/18 1156)       09/18/18 1156          Discharge Note    SUMMARY     Admit Date: 9/18/2018    Discharge Date and Time:  09/18/2018 12:51 PM    Hospital Course (synopsis of major diagnoses, care, treatment, and services provided during the course of the hospital stay):   Patient presented for scheduled procedure. Patient was passed back to OR for hysteroscopy and D&C. Please see OP note for further details. Tolerated procedure well and patient was taken to recovery in a stable condition. Patient was given routine post-op instructions for which patient voiced understanding. Patient was subsequently discharged home.    Final Diagnosis: Post-Op Diagnosis  Codes:     * PMB (postmenopausal bleeding) [N95.0]    Disposition: Home or Self Care    Follow Up/Patient Instructions:     Medications:  Reconciled Home Medications:      Medication List      START taking these medications    HYDROcodone-acetaminophen 5-325 mg per tablet  Commonly known as:  NORCO  Take 1 tablet by mouth every 6 (six) hours as needed for Pain.     ibuprofen 400 MG tablet  Commonly known as:  ADVIL,MOTRIN  Take 1 tablet (400 mg total) by mouth every 4 (four) hours as needed for Other.        CONTINUE taking these medications    amLODIPine 10 MG tablet  Commonly known as:  NORVASC  Take 1 tablet (10 mg total) by mouth once daily.     losartan 100 MG tablet  Commonly known as:  COZAAR     metFORMIN 500 MG tablet  Commonly known as:  GLUCOPHAGE  Take 1 tablet (500 mg total) by mouth 2 (two) times daily with meals.     SYNTHROID 125 MCG tablet  Generic drug:  levothyroxine     TRADJENTA 5 mg Tab tablet  Generic drug:  linagliptin          Discharge Procedure Orders   Diet Adult Regular     Notify your health care provider if you experience any of the following:  temperature >100.4     Notify your health care provider if you experience any of the following:  persistent nausea and vomiting or diarrhea     Notify your health care provider if you experience any of the following:  severe uncontrolled pain     Notify your health care provider if you experience any of the following:  difficulty breathing or increased cough     Notify your health care provider if you experience any of the following:  severe persistent headache     Notify your health care provider if you experience any of the following:  worsening rash     Notify your health care provider if you experience any of the following:  persistent dizziness, light-headedness, or visual disturbances     Notify your health care provider if you experience any of the following:  increased confusion or weakness     Notify your health care provider if you  experience any of the following:   Order Comments: Notify MD if bleeding 1 pad/hour for 2 consecutive hours.     No dressing needed     Activity as tolerated     Alana Bass MD  OB/GYN  PGY-1

## 2018-09-18 NOTE — INTERVAL H&P NOTE
The patient has been examined and the H&P has been reviewed:    I concur with the findings and no changes have occurred since H&P was written.    Anesthesia/Surgery risks, benefits and alternative options discussed and understood by patient/family.    Alana Bass MD  OB/GYN  PGY-1            Active Hospital Problems    Diagnosis  POA    PMB (postmenopausal bleeding) [N95.0]  Yes      Resolved Hospital Problems   No resolved problems to display.

## 2018-09-18 NOTE — TRANSFER OF CARE
"Anesthesia Transfer of Care Note    Patient: Skylar Valdez    Procedure(s) Performed: Procedure(s) (LRB):  HYSTEROSCOPY, WITH DILATION AND CURETTAGE OF UTERUS (N/A)    Patient location: PACU    Anesthesia Type: general    Transport from OR: Transported from OR on 2-3 L/min O2 by NC with adequate spontaneous ventilation    Post pain: adequate analgesia    Post assessment: no apparent anesthetic complications and tolerated procedure well    Post vital signs: stable    Level of consciousness: awake    Nausea/Vomiting: no nausea/vomiting    Complications: none    Transfer of care protocol was followed      Last vitals:   Visit Vitals  BP (!) 150/76 (BP Location: Right arm, Patient Position: Lying)   Pulse 73   Temp 36.6 °C (97.9 °F) (Oral)   Resp 18   Ht 5' 7" (1.702 m)   Wt 93 kg (204 lb 16 oz)   LMP 06/01/2010   SpO2 99%   BMI 32.11 kg/m²     "

## 2018-09-18 NOTE — OP NOTE
Date of surgery: 9/18/2018    OPERATIVE REPORT    PREOPERATIVE DIAGNOSIS  1. Postmenopausal bleeding    POSTOPERATIVE DIAGNOSIS  1. Postmenopausal bleeding   2. S/P D&C/Hysteroscopy    PROCEDURE:  1. Hysteroscopy  2. Dilation and curettage      SURGEON: Lacie Henley MD    ASSISTANT: Alana Bass MD (RES)    ANESTHESIA: General    COMPLICATIONS: None    EBL: none    IV FLUIDS: 1000  cc    URINE OUTPUT: bladder not drained via in-and-out catheterization prior to procedure    Hysteroscopy fluid deficit: 320 cc    SPECIMENS:  1. Endometrial curettage    PROCEDURE:   Patient was taken to the operating room where general anesthesia was administered and found to be adequate. She was placed in the dorsal lithotomy position using Jaleel stirrups, then prepped and draped in the usual sterile fashion. A surgical timeout was performed with patient's name, date of birth, procedure to be performed, and allergies verbalized. All OR staff in agreement. No preoperative antibiotics were administered as none were indicated.    Attention was then turned to the vagina where a weighted sterile speculum was placed in the posterior aspect, and a right angle retractor was placed in the anterior aspect.  The anterior lip of the cervix was grasped with a single tooth tenaculum.  The uterus was sounded to approximately 12 cm. The 5mm hysteroscope was advanced through the cervical os and the uterus was distended with normal saline.  The endometrium was inspected and found toto have some polypoid tissue.  Using the truclear device, the polypoid tissue was removed. The tubal ostia were identified without difficulty, and appeared normal. The hysteroscope was withdrawn without difficulty.     The uterus was then curetted in a clockwise fashion until gritty feeling was noted in all aspects of the uterus. The endometrial scrapings were sent to pathology. The tenaculum was removed and hemostasis was noted at the puncture sites in the  cervix.     Sponge and instrument counts were correct x 2. The patient tolerated the procedure well and was awakened without difficulty. She was taken to the recovery room in stable condition.    Alana Bass MD  OB/GYN  PGY-1    Attending statement    I was present and scrubbed for the entire surgical procedure    Lacie Henley MD, FACOG  Obstetrics and Gynecology

## 2018-09-18 NOTE — ANESTHESIA POSTPROCEDURE EVALUATION
"Anesthesia Post Evaluation    Patient: Skylar Valdez    Procedure(s) Performed: Procedure(s) (LRB):  HYSTEROSCOPY, WITH DILATION AND CURETTAGE OF UTERUS (N/A)    Final Anesthesia Type: general  Patient location during evaluation: PACU  Patient participation: Yes- Able to Participate  Level of consciousness: awake and alert  Post-procedure vital signs: reviewed and stable  Pain management: adequate  Airway patency: patent  PONV status at discharge: No PONV  Anesthetic complications: no      Cardiovascular status: blood pressure returned to baseline and stable  Respiratory status: unassisted, spontaneous ventilation and room air  Hydration status: euvolemic  Follow-up not needed.        Visit Vitals  BP (!) 165/72 (BP Location: Right arm, Patient Position: Lying)   Pulse 72   Temp 36.4 °C (97.6 °F) (Oral)   Resp 16   Ht 5' 7" (1.702 m)   Wt 93 kg (204 lb 16 oz)   LMP 06/01/2010   SpO2 98%   BMI 32.11 kg/m²       Pain/Sarahi Score: Pain Assessment Performed: Yes (9/18/2018  1:44 PM)  Presence of Pain: complains of pain/discomfort (9/18/2018  1:44 PM)  Pain Rating Prior to Med Admin: 7 (9/18/2018 12:40 PM)  Pain Rating Post Med Admin: 5 (9/18/2018  1:04 PM)  Sarahi Score: 10 (9/18/2018  1:44 PM)        "

## 2018-09-18 NOTE — DISCHARGE INSTRUCTIONS

## 2018-09-24 ENCOUNTER — TELEPHONE (OUTPATIENT)
Dept: OBSTETRICS AND GYNECOLOGY | Facility: CLINIC | Age: 68
End: 2018-09-24

## 2018-09-24 NOTE — TELEPHONE ENCOUNTER
I called patient to see how she is doing postop and to review her pathology results.  No answer.  Left message to call the office.  Please let me know when she calls back.

## 2018-10-01 ENCOUNTER — OFFICE VISIT (OUTPATIENT)
Dept: OBSTETRICS AND GYNECOLOGY | Facility: CLINIC | Age: 68
End: 2018-10-01
Attending: OBSTETRICS & GYNECOLOGY
Payer: COMMERCIAL

## 2018-10-01 ENCOUNTER — TELEPHONE (OUTPATIENT)
Dept: OBSTETRICS AND GYNECOLOGY | Facility: CLINIC | Age: 68
End: 2018-10-01

## 2018-10-01 VITALS
DIASTOLIC BLOOD PRESSURE: 78 MMHG | BODY MASS INDEX: 32.56 KG/M2 | SYSTOLIC BLOOD PRESSURE: 138 MMHG | WEIGHT: 207.44 LBS | HEIGHT: 67 IN

## 2018-10-01 DIAGNOSIS — C54.1 LOW GRADE ENDOMETRIAL STROMAL SARCOMA OF UTERUS: ICD-10-CM

## 2018-10-01 DIAGNOSIS — Z09 POSTOP CHECK: Primary | ICD-10-CM

## 2018-10-01 PROCEDURE — 99024 POSTOP FOLLOW-UP VISIT: CPT | Mod: S$GLB,,, | Performed by: OBSTETRICS & GYNECOLOGY

## 2018-10-01 PROCEDURE — 99999 PR PBB SHADOW E&M-EST. PATIENT-LVL III: CPT | Mod: PBBFAC,,, | Performed by: OBSTETRICS & GYNECOLOGY

## 2018-10-01 NOTE — TELEPHONE ENCOUNTER
Contacted the pt at her home number regarding her missed appointment today. Pt stated that she forgot about her appointment. Offered the pt an appointment today for 1PM today. Pt stated that she will be at the clinic for 1PM today.

## 2018-10-01 NOTE — PROGRESS NOTES
"CC: Postoperative visit    Skylar Valdez is a 68 y.o. female  presents for a postoperative visit s/p D&C hysteroscopy on 18.  Her postoperative course was uncomplicated.  She is doing well postoperative.    Pathology showed:  FINAL PATHOLOGIC DIAGNOSIS  1., 2. FRAGMENTS OF ENDOMETRIAL TISSUE SHOWING A CELLULAR NEOPLASM, MOST CONSISTENT  WITH A LOW-GRADE ENDOMETRIAL STROMAL SARCOMA. THE RESULTS OF IMMUNOSTAINS ARE AS  FOLLOWS: CD 10 IS STRONGLY POSITIVE. SYNAPTOPHYSIN IS FOCALLY POSITIVE. PANCYTOKERATIN,  P 53, CD 99, DESMIN, ACTIN AND CK7 ARE ALL NEGATIVE. THE CONTROLS STAIN APPROPRIATELY.  NOTE: ALTHOUGH THE HISTOLOGY AND IMMUNOHISTOCHEMICAL STAINING PATTERN IS CONSISTENT  WITH A LOW-GRADE STROMAL SARCOMA AN ENDOMETRIAL STROMAL NODULE CANNOT BE EXCLUDED  WITHOUT BEING ABLE TO EVALUATE INVASION INTO THE UNDERLYING MYOMETRIUM. THIS CASE WAS  REVIEWED BY DR. BISHOP WHO CONCURS WITH THE DIAGNOSIS    /78 (BP Location: Left arm, Patient Position: Sitting, BP Method: Large (Manual))   Ht 5' 7" (1.702 m)   Wt 94.1 kg (207 lb 7.3 oz)   LMP 2010   BMI 32.49 kg/m²     ROS:  GENERAL: No fever, chills, fatigability or weight loss.  VULVAR: No pain, no lesions and no itching.  VAGINAL: No relaxation, no itching, no discharge, no abnormal bleeding and no lesions.  ABDOMEN: No abdominal pain. Denies nausea. Denies vomiting. No diarrhea. No constipation  BREAST: Denies pain. No lumps. No discharge.  URINARY: No incontinence, no nocturia, no frequency and no dysuria.  CARDIOVASCULAR: No chest pain. No shortness of breath. No leg cramps.  NEUROLOGICAL: No headaches. No vision changes.    Physical Exam      PELVIC: Normal external genitalia without lesions.  Normal hair distribution.  Adequate perineal body, normal urethral meatus.  Vagina moist and well rugated without lesions or discharge.  Cervix pink, without lesions, discharge or tenderness.  No significant cystocele or rectocele.  Bimanual exam " shows uterus to be normal size, regular, mobile and nontender.  Adnexa without masses or tenderness.      1. Postop check     2. 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  Consult placed to [facility].    Patient can return to normal activities.      Return to clinic in 1 year for well woman exam.

## 2018-10-02 ENCOUNTER — TELEPHONE (OUTPATIENT)
Dept: GYNECOLOGIC ONCOLOGY | Facility: CLINIC | Age: 68
End: 2018-10-02

## 2018-10-02 NOTE — TELEPHONE ENCOUNTER
----- Message from Lisa Montenegro sent at 10/2/2018  3:16 PM CDT -----  Contact: mariah   Name of Who is Calling: mariah       What is the request in detail: Patient was returning a missed back from the staff       Can the clinic reply by [facility]: no      What Number to Call Back if not in [facility]: [phone]

## 2018-10-03 ENCOUNTER — TELEPHONE (OUTPATIENT)
Dept: GYNECOLOGIC ONCOLOGY | Facility: CLINIC | Age: 68
End: 2018-10-03

## 2018-10-12 ENCOUNTER — TELEPHONE (OUTPATIENT)
Dept: GYNECOLOGIC ONCOLOGY | Facility: CLINIC | Age: 68
End: 2018-10-12

## 2018-10-15 ENCOUNTER — INITIAL CONSULT (OUTPATIENT)
Dept: GYNECOLOGIC ONCOLOGY | Facility: CLINIC | Age: 68
End: 2018-10-15
Attending: OBSTETRICS & GYNECOLOGY
Payer: COMMERCIAL

## 2018-10-15 ENCOUNTER — TELEPHONE (OUTPATIENT)
Dept: GYNECOLOGIC ONCOLOGY | Facility: CLINIC | Age: 68
End: 2018-10-15

## 2018-10-15 VITALS
DIASTOLIC BLOOD PRESSURE: 72 MMHG | HEART RATE: 81 BPM | SYSTOLIC BLOOD PRESSURE: 163 MMHG | WEIGHT: 207 LBS | BODY MASS INDEX: 32.49 KG/M2 | HEIGHT: 67 IN

## 2018-10-15 DIAGNOSIS — C54.9 UTERINE SARCOMA: Primary | ICD-10-CM

## 2018-10-15 DIAGNOSIS — D49.59 ENDOMETRIAL NEOPLASM: ICD-10-CM

## 2018-10-15 DIAGNOSIS — C54.1 LOW GRADE ENDOMETRIAL STROMAL SARCOMA OF UTERUS: Primary | ICD-10-CM

## 2018-10-15 PROCEDURE — 99245 OFF/OP CONSLTJ NEW/EST HI 55: CPT | Mod: S$GLB,,, | Performed by: OBSTETRICS & GYNECOLOGY

## 2018-10-15 PROCEDURE — 99999 PR PBB SHADOW E&M-EST. PATIENT-LVL III: CPT | Mod: PBBFAC,,, | Performed by: OBSTETRICS & GYNECOLOGY

## 2018-10-15 NOTE — LETTER
October 22, 2018      Lacie Henley MD  [address]  [address]  [place] [facility] [address]           [facility]  [address], [address]  [facility] [facility] [address]  Phone: [phone]  Fax: [phone]          Patient: Skylar Valdez   MR Number: [id number]   YOB: 1950   Date of Visit: 10/15/2018       Dear Dr. Lacie Henley:    Thank you for referring Skylar Valdez to me for evaluation. Attached you will find relevant portions of my assessment and plan of care.    If you have questions, please do not hesitate to call me. I look forward to following Skylar Valdez along with you.    Sincerely,    Berta Edgar  CC:  No Recipients    If you would like to receive this communication electronically, please contact [email or web address] or [phone] to request more information on [employer] access.    For providers and/or their staff who would like to refer a patient to [facility], please contact us through our one-stop-shop provider referral line, [facility], at [phone].    If you feel you have received this communication in error or would no longer like to receive these types of communications, please e-mail [email or web address]

## 2018-10-16 ENCOUNTER — PATIENT MESSAGE (OUTPATIENT)
Dept: SURGERY | Facility: OTHER | Age: 68
End: 2018-10-16

## 2018-10-17 ENCOUNTER — TELEPHONE (OUTPATIENT)
Dept: GYNECOLOGIC ONCOLOGY | Facility: CLINIC | Age: 68
End: 2018-10-17

## 2018-10-17 DIAGNOSIS — D49.59 ENDOMETRIAL NEOPLASM: Primary | ICD-10-CM

## 2018-10-22 DIAGNOSIS — C54.1 ENDOMETRIAL CANCER: Primary | ICD-10-CM

## 2018-10-22 RX ORDER — LIDOCAINE HYDROCHLORIDE 10 MG/ML
1 INJECTION, SOLUTION EPIDURAL; INFILTRATION; INTRACAUDAL; PERINEURAL ONCE
Status: CANCELLED | OUTPATIENT
Start: 2018-10-22 | End: 2018-10-22

## 2018-10-22 RX ORDER — SODIUM CHLORIDE 9 MG/ML
INJECTION, SOLUTION INTRAVENOUS CONTINUOUS
Status: CANCELLED | OUTPATIENT
Start: 2018-10-22

## 2018-10-23 NOTE — PROGRESS NOTES
Subjective:      Patient ID: Skylar Valdez is a 68 y.o. female.    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 Henley for newly diagnosed low grade endometrial stromal sarcoma.     Postmenopausal bleeding. Underwent D&C.   18  FINAL PATHOLOGIC DIAGNOSIS  1., 2. FRAGMENTS OF ENDOMETRIAL TISSUE SHOWING A CELLULAR NEOPLASM, MOST CONSISTENT  WITH A LOW-GRADE ENDOMETRIAL STROMAL SARCOMA.    Pelvic US:  The uterus  measures  11.0 x 7.8 x 7.6 cm .    No prior abdominal surgeries.  x 2. Family history significant for daughter with breast cancer. No FH ovarian, uterine, or colon cancer.   Review of Systems   Constitutional: Negative for appetite change, chills, fatigue and fever.   HENT: Negative for mouth sores.    Respiratory: Negative for cough and shortness of breath.    Cardiovascular: Negative for leg swelling.   Gastrointestinal: Negative for abdominal pain, blood in stool, constipation and diarrhea.   Endocrine: Negative for cold intolerance.   Genitourinary: Positive for menstrual problem. Negative for dysuria and vaginal bleeding.   Musculoskeletal: Negative for myalgias.   Skin: Negative for rash.   Allergic/Immunologic: Negative.    Neurological: Negative for weakness and numbness.   Hematological: Negative for adenopathy. Does not bruise/bleed easily.   Psychiatric/Behavioral: Negative for confusion.       Past Medical History:   Diagnosis Date    Diabetes mellitus, type 2     Hyperlipidemia     Hypertension     Thyroid disease      Past Surgical History:   Procedure Laterality Date    HYSTEROSCOPY WITH DILATION AND CURETTAGE OF UTERUS N/A 2018    Procedure: HYSTEROSCOPY, WITH DILATION AND CURETTAGE OF UTERUS;  Surgeon: Lacie Henley MD;  [facility];  Service: OB/GYN;  Laterality: N/A;    HYSTEROSCOPY, WITH DILATION AND CURETTAGE OF UTERUS N/A 2018    [facility] at [facility] OR     TONSILLECTOMY       Family History   Problem Relation Age of Onset    Hypertension Mother     Hyperlipidemia Mother     Stroke Father     Lung disease Father     Cancer Sister     Breast cancer Daughter     Ovarian cancer Neg Hx     Colon cancer Neg Hx      Social History     Socioeconomic History    Marital status:      Spouse name: Not on file    Number of children: Not on file    Years of education: Not on file    Highest education level: Not on file   Social Needs    Financial resource strain: Not on file    Food insecurity - worry: Not on file    Food insecurity - inability: Not on file    Transportation needs - medical: Not on file    Transportation needs - non-medical: Not on file   Occupational History    Not on file   Tobacco Use    Smoking status: Never Smoker    Smokeless tobacco: Never Used   Substance and Sexual Activity    Alcohol use: No    Drug use: No    Sexual activity: Not Currently     Birth control/protection: None   Other Topics Concern    Not on file   Social History Narrative    Not on file     Current Outpatient Medications   Medication Sig    amlodipine (NORVASC) 10 MG tablet Take 1 tablet (10 mg total) by mouth once daily.    losartan (COZAAR) 100 MG tablet     metformin (GLUCOPHAGE) 500 MG tablet Take 1 tablet (500 mg total) by mouth 2 (two) times daily with meals.    SYNTHROID 125 mcg tablet     HYDROcodone-acetaminophen (NORCO) 5-325 mg per tablet Take 1 tablet by mouth every 6 (six) hours as needed for Pain.    ibuprofen (ADVIL,MOTRIN) 400 MG tablet Take 1 tablet (400 mg total) by mouth every 4 (four) hours as needed for Other.    TRADJENTA 5 mg Tab tablet      No current facility-administered medications for this visit.      Review of patient's allergies indicates:  No Known Allergies    Objective:   Physical Exam:   Constitutional: She is oriented to person, place, and time. She appears well-developed and well-nourished.    HENT:   Head:  Normocephalic and atraumatic.    Eyes: EOM are normal. Pupils are equal, round, and reactive to light.    Neck: Normal range of motion. Neck supple. No thyromegaly present.    Cardiovascular: Normal rate, regular rhythm and intact distal pulses.     Pulmonary/Chest: Effort normal and breath sounds normal. No respiratory distress. She has no wheezes.        Abdominal: Soft. Bowel sounds are normal. She exhibits no distension, no ascites and no mass. There is no tenderness.     Genitourinary: Rectum normal and vagina normal. Pelvic exam was performed with patient supine. There is no lesion on the right labia. There is no lesion on the left labia. Uterus is enlarged. Uterus is not fixed. Cervix is normal. Right adnexum displays no mass. Left adnexum displays no mass.   Genitourinary Comments: 12 week size uterus.            Musculoskeletal: Normal range of motion and moves all extremeties.      Lymphadenopathy:     She has no cervical adenopathy.        Right: No inguinal and no supraclavicular adenopathy present.        Left: No inguinal and no supraclavicular adenopathy present.    Neurological: She is alert and oriented to person, place, and time.    Skin: Skin is warm and dry. No rash noted.    Psychiatric: She has a normal mood and affect.       Assessment:     1. 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 Endometrial neoplasm        Plan:     Orders Placed This Encounter   Procedures    CT Chest Abdoment Pelvis With Contrast       I discussed with the patient the natural history of endometrial stromal sarcomas. Standard management includes surgical staging. Will obtain CT CAP for metastatic survey. She is a candidate for minimally invasive approach. Would plan for minilap for omentectomy and uterine extraction if needed due to uterine size. She desires to proceed. The risks, benefits, and indications of the procedure were discussed with the patient and her family members if present.  These included  bleeding, transfusion, infection, damage to surrounding tissues (bowel, bladder, ureter), wound separation, perioperative cardiac events, VTE, pneumonia, and possible death.  She voiced understanding, all questions were answered and consents were signed.    Plan for RTLH/BSO/staging 11/9/18 [facility].  Pre op anesthesia.   CT CAP    I spent approximately 60 minutes reviewing the available records and evaluating the patient, out of which over 50% of the time was spent face to face with the patient in counseling and coordinating this patient's care.

## 2018-10-23 NOTE — H&P (VIEW-ONLY)
Subjective:      Patient ID: Skylar Valdez is a 68 y.o. female.    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 Henley for newly diagnosed low grade endometrial stromal sarcoma.     Postmenopausal bleeding. Underwent D&C.   18  FINAL PATHOLOGIC DIAGNOSIS  1., 2. FRAGMENTS OF ENDOMETRIAL TISSUE SHOWING A CELLULAR NEOPLASM, MOST CONSISTENT  WITH A LOW-GRADE ENDOMETRIAL STROMAL SARCOMA.    Pelvic US:  The uterus  measures  11.0 x 7.8 x 7.6 cm .    No prior abdominal surgeries.  x 2. Family history significant for daughter with breast cancer. No FH ovarian, uterine, or colon cancer.   Review of Systems   Constitutional: Negative for appetite change, chills, fatigue and fever.   HENT: Negative for mouth sores.    Respiratory: Negative for cough and shortness of breath.    Cardiovascular: Negative for leg swelling.   Gastrointestinal: Negative for abdominal pain, blood in stool, constipation and diarrhea.   Endocrine: Negative for cold intolerance.   Genitourinary: Positive for menstrual problem. Negative for dysuria and vaginal bleeding.   Musculoskeletal: Negative for myalgias.   Skin: Negative for rash.   Allergic/Immunologic: Negative.    Neurological: Negative for weakness and numbness.   Hematological: Negative for adenopathy. Does not bruise/bleed easily.   Psychiatric/Behavioral: Negative for confusion.       Past Medical History:   Diagnosis Date    Diabetes mellitus, type 2     Hyperlipidemia     Hypertension     Thyroid disease      Past Surgical History:   Procedure Laterality Date    HYSTEROSCOPY WITH DILATION AND CURETTAGE OF UTERUS N/A 2018    Procedure: HYSTEROSCOPY, WITH DILATION AND CURETTAGE OF UTERUS;  Surgeon: Lacie Henley MD;  Location: [facility];  Service: OB/GYN;  Laterality: N/A;    HYSTEROSCOPY, WITH DILATION AND CURETTAGE OF UTERUS N/A 2018    [facility] at [facility]     TONSILLECTOMY       Family History   Problem Relation Age of Onset    Hypertension Mother     Hyperlipidemia Mother     Stroke Father     Lung disease Father     Cancer Sister     Breast cancer Daughter     Ovarian cancer Neg Hx     Colon cancer Neg Hx      Social History     Socioeconomic History    Marital status:      Spouse name: Not on file    Number of children: Not on file    Years of education: Not on file    Highest education level: Not on file   Social Needs    Financial resource strain: Not on file    Food insecurity - worry: Not on file    Food insecurity - inability: Not on file    Transportation needs - medical: Not on file    Transportation needs - non-medical: Not on file   Occupational History    Not on file   Tobacco Use    Smoking status: Never Smoker    Smokeless tobacco: Never Used   Substance and Sexual Activity    Alcohol use: No    Drug use: No    Sexual activity: Not Currently     Birth control/protection: None   Other Topics Concern    Not on file   Social History Narrative    Not on file     Current Outpatient Medications   Medication Sig    amlodipine (NORVASC) 10 MG tablet Take 1 tablet (10 mg total) by mouth once daily.    losartan (COZAAR) 100 MG tablet     metformin (GLUCOPHAGE) 500 MG tablet Take 1 tablet (500 mg total) by mouth 2 (two) times daily with meals.    SYNTHROID 125 mcg tablet     HYDROcodone-acetaminophen (NORCO) 5-325 mg per tablet Take 1 tablet by mouth every 6 (six) hours as needed for Pain.    ibuprofen (ADVIL,MOTRIN) 400 MG tablet Take 1 tablet (400 mg total) by mouth every 4 (four) hours as needed for Other.    TRADJENTA 5 mg Tab tablet      No current facility-administered medications for this visit.      Review of patient's allergies indicates:  No Known Allergies    Objective:   Physical Exam:   Constitutional: She is oriented to person, place, and time. She appears well-developed and well-nourished.    HENT:   Head:  Normocephalic and atraumatic.    Eyes: EOM are normal. Pupils are equal, round, and reactive to light.    Neck: Normal range of motion. Neck supple. No thyromegaly present.    Cardiovascular: Normal rate, regular rhythm and intact distal pulses.     Pulmonary/Chest: Effort normal and breath sounds normal. No respiratory distress. She has no wheezes.        Abdominal: Soft. Bowel sounds are normal. She exhibits no distension, no ascites and no mass. There is no tenderness.     Genitourinary: Rectum normal and vagina normal. Pelvic exam was performed with patient supine. There is no lesion on the right labia. There is no lesion on the left labia. Uterus is enlarged. Uterus is not fixed. Cervix is normal. Right adnexum displays no mass. Left adnexum displays no mass.   Genitourinary Comments: 12 week size uterus.            Musculoskeletal: Normal range of motion and moves all extremeties.      Lymphadenopathy:     She has no cervical adenopathy.        Right: No inguinal and no supraclavicular adenopathy present.        Left: No inguinal and no supraclavicular adenopathy present.    Neurological: She is alert and oriented to person, place, and time.    Skin: Skin is warm and dry. No rash noted.    Psychiatric: She has a normal mood and affect.       Assessment:     1. 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 Endometrial neoplasm        Plan:     Orders Placed This Encounter   Procedures    CT Chest Abdoment Pelvis With Contrast       I discussed with the patient the natural history of endometrial stromal sarcomas. Standard management includes surgical staging. Will obtain CT CAP for metastatic survey. She is a candidate for minimally invasive approach. Would plan for minilap for omentectomy and uterine extraction if needed due to uterine size. She desires to proceed. The risks, benefits, and indications of the procedure were discussed with the patient and her family members if present.  These included  bleeding, transfusion, infection, damage to surrounding tissues (bowel, bladder, ureter), wound separation, perioperative cardiac events, VTE, pneumonia, and possible death.  She voiced understanding, all questions were answered and consents were signed.    Plan for RTLH/BSO/staging 11/9/18 [facility].  Pre op anesthesia.   CT CAP    I spent approximately 60 minutes reviewing the available records and evaluating the patient, out of which over 50% of the time was spent face to face with the patient in counseling and coordinating this patient's care.

## 2018-10-24 ENCOUNTER — HOSPITAL ENCOUNTER (OUTPATIENT)
Dept: RADIOLOGY | Facility: HOSPITAL | Age: 68
Discharge: HOME | End: 2018-10-24
Attending: OBSTETRICS & GYNECOLOGY
Payer: COMMERCIAL

## 2018-10-24 DIAGNOSIS — D49.59 ENDOMETRIAL NEOPLASM: ICD-10-CM

## 2018-10-24 PROCEDURE — 25500020 PHARM REV CODE 255: Mod: PO | Performed by: OBSTETRICS & GYNECOLOGY

## 2018-10-24 PROCEDURE — 74177 CT ABD & PELVIS W/CONTRAST: CPT | Mod: TC,PO

## 2018-10-24 RX ADMIN — IOHEXOL 30 ML: 300 INJECTION, SOLUTION INTRAVENOUS at 01:10

## 2018-10-24 RX ADMIN — IOHEXOL 100 ML: 350 INJECTION, SOLUTION INTRAVENOUS at 03:10

## 2018-10-30 ENCOUNTER — TELEPHONE (OUTPATIENT)
Dept: GYNECOLOGIC ONCOLOGY | Facility: CLINIC | Age: 68
End: 2018-10-30

## 2018-10-30 NOTE — TELEPHONE ENCOUNTER
Called to review CT with patient. No obvious evidence of metastatic disease. No answer. Left voicemail.

## 2018-11-01 ENCOUNTER — ANESTHESIA EVENT (OUTPATIENT)
Dept: SURGERY | Facility: OTHER | Age: 68
DRG: 735 | End: 2018-11-01
Payer: COMMERCIAL

## 2018-11-01 ENCOUNTER — HOSPITAL ENCOUNTER (OUTPATIENT)
Dept: PREADMISSION TESTING | Facility: OTHER | Age: 68
Discharge: HOME | End: 2018-11-01
Attending: OBSTETRICS & GYNECOLOGY
Payer: COMMERCIAL

## 2018-11-01 VITALS
TEMPERATURE: 98 F | BODY MASS INDEX: 30.61 KG/M2 | HEART RATE: 78 BPM | DIASTOLIC BLOOD PRESSURE: 74 MMHG | HEIGHT: 67 IN | WEIGHT: 195 LBS | OXYGEN SATURATION: 98 % | SYSTOLIC BLOOD PRESSURE: 177 MMHG

## 2018-11-01 DIAGNOSIS — C54.1 ENDOMETRIAL CANCER: ICD-10-CM

## 2018-11-01 LAB
ABO + RH BLD: NORMAL
BLD GP AB SCN CELLS X3 SERPL QL: NORMAL

## 2018-11-01 PROCEDURE — 86901 BLOOD TYPING SEROLOGIC RH(D): CPT

## 2018-11-01 PROCEDURE — 36415 COLL VENOUS BLD VENIPUNCTURE: CPT

## 2018-11-01 RX ORDER — LIDOCAINE HYDROCHLORIDE 10 MG/ML
0.5 INJECTION, SOLUTION EPIDURAL; INFILTRATION; INTRACAUDAL; PERINEURAL ONCE
Status: CANCELLED | OUTPATIENT
Start: 2018-11-01 | End: 2018-11-01

## 2018-11-01 RX ORDER — SODIUM CHLORIDE, SODIUM LACTATE, POTASSIUM CHLORIDE, CALCIUM CHLORIDE 600; 310; 30; 20 MG/100ML; MG/100ML; MG/100ML; MG/100ML
INJECTION, SOLUTION INTRAVENOUS CONTINUOUS
Status: CANCELLED | OUTPATIENT
Start: 2018-11-01

## 2018-11-01 NOTE — DISCHARGE INSTRUCTIONS
PRE-ADMIT TESTING -  [phone]    [address]  [facility]          Your surgery has been scheduled at [facility]. We are pleased to have the opportunity to serve you. For Further Information please call [phone].    On the day of surgery please report to the Information Desk on the 1st floor.    · CONTACT YOUR PHYSICIAN'S OFFICE THE DAY PRIOR TO YOUR SURGERY TO OBTAIN YOUR ARRIVAL TIME.     · The evening before surgery do not eat anything after 9 p.m. ( this includes hard candy, chewing gum and mints).  You may only have GATORADE, POWERADE AND WATER  from 9 p.m. until you leave your home.   DO NOT DRINK ANY LIQUIDS ON THE WAY TO THE HOSPITAL.      SPECIAL MEDICATION INSTRUCTIONS: TAKE medications checked off by the Anesthesiologist on your Medication List.    Angiogram Patients: Take medications as instructed by your physician, including aspirin.     Surgery Patients:    If you take ASPIRIN - Your PHYSICIAN/SURGEON will need to inform you IF/OR when you need to stop taking aspirin prior to your surgery.     Do Not take any medications containing IBUPROFEN.  Do Not Wear any make-up or dark nail polish   (especially eye make-up) to surgery. If you come to surgery with makeup on you will be required to remove the makeup or nail polish.    Do not shave your surgical area at least 5 days prior to your surgery. The surgical prep will be performed at the hospital according to Infection Control regulations.    Leave all valuables at home.   Do Not wear any jewelry or watches, including any metal in body piercings.  Contact Lens must be removed before surgery. Either do not wear the contact lens or bring a case and solution for storage.  Please bring a container for eyeglasses or dentures as required.  Bring any paperwork your physician has provided, such as consent forms,  history and physicals, doctor's orders, etc.   Bring comfortable clothes that are loose fitting to wear upon  discharge. Take into consideration the type of surgery being performed.  Maintain your diet as advised per your physician the day prior to surgery.      Adequate rest the night before surgery is advised.   Park in the Parking lot behind the hospital or in the [facility] across the street from the parking lot. Parking is complimentary.  If you will be discharged the same day as your procedure, please arrange for a responsible adult to drive you home or to accompany you if traveling by taxi.   YOU WILL NOT BE PERMITTED TO DRIVE OR TO LEAVE THE HOSPITAL ALONE AFTER SURGERY.   It is strongly recommended that you arrange for someone to remain with you for the first 24 hrs following your surgery.       Thank you for your cooperation.  The Staff of [facility].        Bathing Instructions                                                                 Please shower the evening before and morning of your procedure with    ANTIBACTERIAL SOAP. ( DIAL, etc )  Concentrate on the surgical area   for at least 3 minutes and rinse completely. Dry off as usual.   Do not use any deodorant, powder, body lotions, perfume, after shave or    cologne.

## 2018-11-01 NOTE — ANESTHESIA PREPROCEDURE EVALUATION
11/01/2018  Skylar Valdez is a 68 y.o., female.    Anesthesia Evaluation    I have reviewed the Patient Summary Reports.    I have reviewed the Nursing Notes.   I have reviewed the Medications.     Review of Systems  Anesthesia Hx:  No problems with previous Anesthesia  Denies Family Hx of Anesthesia complications.   Denies Personal Hx of Anesthesia complications.   Social:  Non-Smoker    Hematology/Oncology:  Hematology Normal   Oncology Normal     EENT/Dental:EENT/Dental Normal   Cardiovascular:   Exercise tolerance: good Hypertension, well controlled    Pulmonary:  Pulmonary Normal    Renal/:  Renal/ Normal     Musculoskeletal:  Musculoskeletal Normal    Neurological:  Neurology Normal    Endocrine:   Diabetes, well controlled, type 2    Dermatological:  Skin Normal    Psych:  Psychiatric Normal           Physical Exam  General:  Well nourished    Airway/Jaw/Neck:  Airway Findings: Mouth Opening: Normal Tongue: Normal  General Airway Assessment: Adult  Mallampati: I  TM Distance: Normal, at least 6 cm  Jaw/Neck Findings:  Neck ROM: Normal ROM      Dental:  Dental Findings: In tact             Anesthesia Plan  Type of Anesthesia, risks & benefits discussed:  Anesthesia Type:  general  Patient's Preference:   Intra-op Monitoring Plan: standard ASA monitors  Intra-op Monitoring Plan Comments:   Post Op Pain Control Plan: multimodal analgesia  Post Op Pain Control Plan Comments:   Induction:   IV  Beta Blocker:         Informed Consent: Patient understands risks and agrees with Anesthesia plan.  Questions answered. Anesthesia consent signed with patient.  ASA Score: 3     Day of Surgery Review of History & Physical:    H&P update referred to the surgeon.     Anesthesia Plan Notes: Pt had d&C sept 2018, no issues. T&S ordered.        Ready For Surgery From Anesthesia Perspective.

## 2018-11-04 DIAGNOSIS — E11.9 TYPE 2 DIABETES MELLITUS TREATED WITHOUT INSULIN: ICD-10-CM

## 2018-11-04 DIAGNOSIS — I10 ESSENTIAL HYPERTENSION: ICD-10-CM

## 2018-11-05 RX ORDER — METFORMIN HYDROCHLORIDE 500 MG/1
500 TABLET ORAL 2 TIMES DAILY WITH MEALS
Qty: 180 TABLET | Refills: 3 | OUTPATIENT
Start: 2018-11-05

## 2018-11-05 RX ORDER — AMLODIPINE BESYLATE 10 MG/1
10 TABLET ORAL DAILY
Qty: 90 TABLET | Refills: 3 | OUTPATIENT
Start: 2018-11-05

## 2018-11-08 ENCOUNTER — TELEPHONE (OUTPATIENT)
Dept: GYNECOLOGIC ONCOLOGY | Facility: CLINIC | Age: 68
End: 2018-11-08

## 2018-11-08 NOTE — TELEPHONE ENCOUNTER
Left message for pt informing her she is to go to the [facility] location at [address] Friday 11/9 for 12 noon.

## 2018-11-08 NOTE — TELEPHONE ENCOUNTER
----- Message from Jarett Holcomb sent at 11/8/2018  1:57 PM CST -----  Contact: CUCO AVILA [[id number]]            Name of Who is Calling: AUSTINCUCO EDWARD [[id number]]      What is the request in detail: Patient calling to get surgery time for in the morning. Please call    Can the clinic reply by [facility]: no      [facility]: [phone]

## 2018-11-09 ENCOUNTER — ANESTHESIA (OUTPATIENT)
Dept: SURGERY | Facility: OTHER | Age: 68
DRG: 735 | End: 2018-11-09
Payer: COMMERCIAL

## 2018-11-09 ENCOUNTER — HOSPITAL ENCOUNTER (INPATIENT)
Facility: OTHER | Age: 68
LOS: 2 days | Discharge: HOME | DRG: 735 | End: 2018-11-11
Attending: OBSTETRICS & GYNECOLOGY | Admitting: OBSTETRICS & GYNECOLOGY
Payer: COMMERCIAL

## 2018-11-09 DIAGNOSIS — Z90.710 S/P TAH-BSO: Primary | ICD-10-CM

## 2018-11-09 DIAGNOSIS — C54.1 ENDOMETRIAL CANCER: ICD-10-CM

## 2018-11-09 DIAGNOSIS — Z90.722 S/P TAH-BSO: ICD-10-CM

## 2018-11-09 DIAGNOSIS — Z90.710 S/P TAH-BSO: ICD-10-CM

## 2018-11-09 DIAGNOSIS — Z90.722 S/P TAH-BSO: Primary | ICD-10-CM

## 2018-11-09 DIAGNOSIS — Z90.79 S/P TAH-BSO: ICD-10-CM

## 2018-11-09 DIAGNOSIS — Z90.79 S/P TAH-BSO: Primary | ICD-10-CM

## 2018-11-09 LAB — POCT GLUCOSE: 146 MG/DL (ref 70–110)

## 2018-11-09 PROCEDURE — 63600175 PHARM REV CODE 636 W HCPCS: Mod: JG | Performed by: NURSE ANESTHETIST, CERTIFIED REGISTERED

## 2018-11-09 PROCEDURE — 25000003 PHARM REV CODE 250: Performed by: OBSTETRICS & GYNECOLOGY

## 2018-11-09 PROCEDURE — 36000712 HC OR TIME LEV V 1ST 15 MIN: Performed by: OBSTETRICS & GYNECOLOGY

## 2018-11-09 PROCEDURE — P9045 ALBUMIN (HUMAN), 5%, 250 ML: HCPCS | Mod: JG | Performed by: NURSE ANESTHETIST, CERTIFIED REGISTERED

## 2018-11-09 PROCEDURE — 25000003 PHARM REV CODE 250: Performed by: NURSE ANESTHETIST, CERTIFIED REGISTERED

## 2018-11-09 PROCEDURE — 88342 IMHCHEM/IMCYTCHM 1ST ANTB: CPT | Performed by: PATHOLOGY

## 2018-11-09 PROCEDURE — 63600175 PHARM REV CODE 636 W HCPCS: Performed by: ANESTHESIOLOGY

## 2018-11-09 PROCEDURE — 8E0W4CZ ROBOTIC ASSISTED PROCEDURE OF TRUNK REGION, PERCUTANEOUS ENDOSCOPIC APPROACH: ICD-10-PCS | Performed by: OBSTETRICS & GYNECOLOGY

## 2018-11-09 PROCEDURE — 07TC0ZZ RESECTION OF PELVIS LYMPHATIC, OPEN APPROACH: ICD-10-PCS | Performed by: OBSTETRICS & GYNECOLOGY

## 2018-11-09 PROCEDURE — 36000713 HC OR TIME LEV V EA ADD 15 MIN: Performed by: OBSTETRICS & GYNECOLOGY

## 2018-11-09 PROCEDURE — 88307 TISSUE EXAM BY PATHOLOGIST: CPT | Performed by: PATHOLOGY

## 2018-11-09 PROCEDURE — 25000003 PHARM REV CODE 250: Performed by: STUDENT IN AN ORGANIZED HEALTH CARE EDUCATION/TRAINING PROGRAM

## 2018-11-09 PROCEDURE — 88309 TISSUE EXAM BY PATHOLOGIST: CPT | Mod: 26,,, | Performed by: PATHOLOGY

## 2018-11-09 PROCEDURE — 63600175 PHARM REV CODE 636 W HCPCS: Performed by: OBSTETRICS & GYNECOLOGY

## 2018-11-09 PROCEDURE — 71000039 HC RECOVERY, EACH ADD'L HOUR: Performed by: OBSTETRICS & GYNECOLOGY

## 2018-11-09 PROCEDURE — 25000003 PHARM REV CODE 250: Performed by: ANESTHESIOLOGY

## 2018-11-09 PROCEDURE — 27201423 OPTIME MED/SURG SUP & DEVICES STERILE SUPPLY: Performed by: OBSTETRICS & GYNECOLOGY

## 2018-11-09 PROCEDURE — 88305 TISSUE EXAM BY PATHOLOGIST: CPT | Performed by: PATHOLOGY

## 2018-11-09 PROCEDURE — 0UT70ZZ RESECTION OF BILATERAL FALLOPIAN TUBES, OPEN APPROACH: ICD-10-PCS | Performed by: OBSTETRICS & GYNECOLOGY

## 2018-11-09 PROCEDURE — 63600175 PHARM REV CODE 636 W HCPCS: Performed by: NURSE ANESTHETIST, CERTIFIED REGISTERED

## 2018-11-09 PROCEDURE — 71000033 HC RECOVERY, INTIAL HOUR: Performed by: OBSTETRICS & GYNECOLOGY

## 2018-11-09 PROCEDURE — 88341 IMHCHEM/IMCYTCHM EA ADD ANTB: CPT | Mod: 26,,, | Performed by: PATHOLOGY

## 2018-11-09 PROCEDURE — 82962 GLUCOSE BLOOD TEST: CPT | Performed by: OBSTETRICS & GYNECOLOGY

## 2018-11-09 PROCEDURE — C9290 INJ, BUPIVACAINE LIPOSOME: HCPCS | Performed by: OBSTETRICS & GYNECOLOGY

## 2018-11-09 PROCEDURE — 37000009 HC ANESTHESIA EA ADD 15 MINS: Performed by: OBSTETRICS & GYNECOLOGY

## 2018-11-09 PROCEDURE — 63600175 PHARM REV CODE 636 W HCPCS: Performed by: STUDENT IN AN ORGANIZED HEALTH CARE EDUCATION/TRAINING PROGRAM

## 2018-11-09 PROCEDURE — 58210 EXTENSIVE HYSTERECTOMY: CPT | Mod: ,,, | Performed by: OBSTETRICS & GYNECOLOGY

## 2018-11-09 PROCEDURE — 0UT20ZZ RESECTION OF BILATERAL OVARIES, OPEN APPROACH: ICD-10-PCS | Performed by: OBSTETRICS & GYNECOLOGY

## 2018-11-09 PROCEDURE — 0UT90ZZ RESECTION OF UTERUS, OPEN APPROACH: ICD-10-PCS | Performed by: OBSTETRICS & GYNECOLOGY

## 2018-11-09 PROCEDURE — 37000008 HC ANESTHESIA 1ST 15 MINUTES: Performed by: OBSTETRICS & GYNECOLOGY

## 2018-11-09 PROCEDURE — 88307 TISSUE EXAM BY PATHOLOGIST: CPT | Mod: 26,,, | Performed by: PATHOLOGY

## 2018-11-09 PROCEDURE — 11000001 HC ACUTE MED/SURG PRIVATE ROOM

## 2018-11-09 PROCEDURE — 0WJJ4ZZ INSPECTION OF PELVIC CAVITY, PERCUTANEOUS ENDOSCOPIC APPROACH: ICD-10-PCS | Performed by: OBSTETRICS & GYNECOLOGY

## 2018-11-09 PROCEDURE — 88342 IMHCHEM/IMCYTCHM 1ST ANTB: CPT | Mod: 26,,, | Performed by: PATHOLOGY

## 2018-11-09 PROCEDURE — 94761 N-INVAS EAR/PLS OXIMETRY MLT: CPT

## 2018-11-09 PROCEDURE — 0DTU0ZZ RESECTION OF OMENTUM, OPEN APPROACH: ICD-10-PCS | Performed by: OBSTETRICS & GYNECOLOGY

## 2018-11-09 PROCEDURE — 88305 TISSUE EXAM BY PATHOLOGIST: CPT | Mod: 26,,, | Performed by: PATHOLOGY

## 2018-11-09 PROCEDURE — 58210 EXTENSIVE HYSTERECTOMY: CPT | Mod: AS,,, | Performed by: NURSE PRACTITIONER

## 2018-11-09 PROCEDURE — S0020 INJECTION, BUPIVICAINE HYDRO: HCPCS | Performed by: OBSTETRICS & GYNECOLOGY

## 2018-11-09 RX ORDER — LIDOCAINE HYDROCHLORIDE 10 MG/ML
0.5 INJECTION, SOLUTION EPIDURAL; INFILTRATION; INTRACAUDAL; PERINEURAL ONCE
Status: DISCONTINUED | OUTPATIENT
Start: 2018-11-09 | End: 2018-11-09 | Stop reason: HOSPADM

## 2018-11-09 RX ORDER — ROCURONIUM BROMIDE 10 MG/ML
INJECTION, SOLUTION INTRAVENOUS
Status: DISCONTINUED | OUTPATIENT
Start: 2018-11-09 | End: 2018-11-09

## 2018-11-09 RX ORDER — SODIUM CHLORIDE 0.9 % (FLUSH) 0.9 %
3 SYRINGE (ML) INJECTION
Status: DISCONTINUED | OUTPATIENT
Start: 2018-11-09 | End: 2018-11-09

## 2018-11-09 RX ORDER — BUPIVACAINE HYDROCHLORIDE 5 MG/ML
INJECTION, SOLUTION EPIDURAL; INTRACAUDAL
Status: DISCONTINUED | OUTPATIENT
Start: 2018-11-09 | End: 2018-11-09 | Stop reason: HOSPADM

## 2018-11-09 RX ORDER — ACETAMINOPHEN 10 MG/ML
INJECTION, SOLUTION INTRAVENOUS
Status: DISCONTINUED | OUTPATIENT
Start: 2018-11-09 | End: 2018-11-09

## 2018-11-09 RX ORDER — FENTANYL CITRATE 50 UG/ML
INJECTION, SOLUTION INTRAMUSCULAR; INTRAVENOUS
Status: DISCONTINUED | OUTPATIENT
Start: 2018-11-09 | End: 2018-11-09

## 2018-11-09 RX ORDER — OXYCODONE HYDROCHLORIDE 5 MG/1
5 TABLET ORAL
Status: DISCONTINUED | OUTPATIENT
Start: 2018-11-09 | End: 2018-11-09 | Stop reason: HOSPADM

## 2018-11-09 RX ORDER — MIDAZOLAM HYDROCHLORIDE 1 MG/ML
INJECTION INTRAMUSCULAR; INTRAVENOUS
Status: DISCONTINUED | OUTPATIENT
Start: 2018-11-09 | End: 2018-11-09

## 2018-11-09 RX ORDER — KETOROLAC TROMETHAMINE 30 MG/ML
30 INJECTION, SOLUTION INTRAMUSCULAR; INTRAVENOUS EVERY 8 HOURS
Status: DISCONTINUED | OUTPATIENT
Start: 2018-11-09 | End: 2018-11-09

## 2018-11-09 RX ORDER — ACETAMINOPHEN 10 MG/ML
1000 INJECTION, SOLUTION INTRAVENOUS EVERY 8 HOURS
Status: DISCONTINUED | OUTPATIENT
Start: 2018-11-09 | End: 2018-11-09

## 2018-11-09 RX ORDER — SODIUM CHLORIDE 9 MG/ML
INJECTION, SOLUTION INTRAVENOUS CONTINUOUS
Status: DISCONTINUED | OUTPATIENT
Start: 2018-11-09 | End: 2018-11-09

## 2018-11-09 RX ORDER — LIDOCAINE HYDROCHLORIDE 10 MG/ML
1 INJECTION, SOLUTION EPIDURAL; INFILTRATION; INTRACAUDAL; PERINEURAL ONCE
Status: DISCONTINUED | OUTPATIENT
Start: 2018-11-09 | End: 2018-11-09

## 2018-11-09 RX ORDER — HYDROMORPHONE HYDROCHLORIDE 2 MG/ML
0.4 INJECTION, SOLUTION INTRAMUSCULAR; INTRAVENOUS; SUBCUTANEOUS EVERY 5 MIN PRN
Status: DISCONTINUED | OUTPATIENT
Start: 2018-11-09 | End: 2018-11-09 | Stop reason: HOSPADM

## 2018-11-09 RX ORDER — HYDROCODONE BITARTRATE AND ACETAMINOPHEN 10; 325 MG/1; MG/1
1 TABLET ORAL EVERY 4 HOURS PRN
Status: DISCONTINUED | OUTPATIENT
Start: 2018-11-09 | End: 2018-11-11 | Stop reason: HOSPADM

## 2018-11-09 RX ORDER — GLYCOPYRROLATE 0.2 MG/ML
INJECTION INTRAMUSCULAR; INTRAVENOUS
Status: DISCONTINUED | OUTPATIENT
Start: 2018-11-09 | End: 2018-11-09

## 2018-11-09 RX ORDER — FENTANYL CITRATE 50 UG/ML
25 INJECTION, SOLUTION INTRAMUSCULAR; INTRAVENOUS EVERY 5 MIN PRN
Status: DISCONTINUED | OUTPATIENT
Start: 2018-11-09 | End: 2018-11-09 | Stop reason: HOSPADM

## 2018-11-09 RX ORDER — CEFAZOLIN SODIUM 1 G/3ML
2 INJECTION, POWDER, FOR SOLUTION INTRAMUSCULAR; INTRAVENOUS
Status: COMPLETED | OUTPATIENT
Start: 2018-11-09 | End: 2018-11-09

## 2018-11-09 RX ORDER — ONDANSETRON 2 MG/ML
4 INJECTION INTRAMUSCULAR; INTRAVENOUS DAILY PRN
Status: DISCONTINUED | OUTPATIENT
Start: 2018-11-09 | End: 2018-11-09 | Stop reason: HOSPADM

## 2018-11-09 RX ORDER — MUPIROCIN 20 MG/G
1 OINTMENT TOPICAL 2 TIMES DAILY
Status: DISCONTINUED | OUTPATIENT
Start: 2018-11-09 | End: 2018-11-11 | Stop reason: HOSPADM

## 2018-11-09 RX ORDER — SODIUM CHLORIDE AND POTASSIUM CHLORIDE 150; 900 MG/100ML; MG/100ML
INJECTION, SOLUTION INTRAVENOUS CONTINUOUS
Status: DISCONTINUED | OUTPATIENT
Start: 2018-11-09 | End: 2018-11-11

## 2018-11-09 RX ORDER — INSULIN ASPART 100 [IU]/ML
1-10 INJECTION, SOLUTION INTRAVENOUS; SUBCUTANEOUS
Status: DISCONTINUED | OUTPATIENT
Start: 2018-11-09 | End: 2018-11-11 | Stop reason: HOSPADM

## 2018-11-09 RX ORDER — MUPIROCIN 20 MG/G
OINTMENT TOPICAL 2 TIMES DAILY
Status: DISCONTINUED | OUTPATIENT
Start: 2018-11-09 | End: 2018-11-09

## 2018-11-09 RX ORDER — SODIUM CHLORIDE, SODIUM LACTATE, POTASSIUM CHLORIDE, CALCIUM CHLORIDE 600; 310; 30; 20 MG/100ML; MG/100ML; MG/100ML; MG/100ML
INJECTION, SOLUTION INTRAVENOUS CONTINUOUS
Status: DISCONTINUED | OUTPATIENT
Start: 2018-11-09 | End: 2018-11-09

## 2018-11-09 RX ORDER — MEPERIDINE HYDROCHLORIDE 50 MG/ML
12.5 INJECTION INTRAMUSCULAR; INTRAVENOUS; SUBCUTANEOUS ONCE AS NEEDED
Status: DISCONTINUED | OUTPATIENT
Start: 2018-11-09 | End: 2018-11-09 | Stop reason: HOSPADM

## 2018-11-09 RX ORDER — GLUCAGON 1 MG
1 KIT INJECTION
Status: DISCONTINUED | OUTPATIENT
Start: 2018-11-09 | End: 2018-11-11 | Stop reason: HOSPADM

## 2018-11-09 RX ORDER — IBUPROFEN 200 MG
16 TABLET ORAL
Status: DISCONTINUED | OUTPATIENT
Start: 2018-11-09 | End: 2018-11-11 | Stop reason: HOSPADM

## 2018-11-09 RX ORDER — OXYCODONE HYDROCHLORIDE 5 MG/1
5 TABLET ORAL EVERY 4 HOURS PRN
Status: DISCONTINUED | OUTPATIENT
Start: 2018-11-09 | End: 2018-11-11

## 2018-11-09 RX ORDER — IBUPROFEN 200 MG
24 TABLET ORAL
Status: DISCONTINUED | OUTPATIENT
Start: 2018-11-09 | End: 2018-11-11 | Stop reason: HOSPADM

## 2018-11-09 RX ORDER — LIDOCAINE HCL/PF 100 MG/5ML
SYRINGE (ML) INTRAVENOUS
Status: DISCONTINUED | OUTPATIENT
Start: 2018-11-09 | End: 2018-11-09

## 2018-11-09 RX ORDER — LOSARTAN POTASSIUM 50 MG/1
100 TABLET ORAL DAILY
Status: DISCONTINUED | OUTPATIENT
Start: 2018-11-10 | End: 2018-11-11 | Stop reason: HOSPADM

## 2018-11-09 RX ORDER — ALBUMIN HUMAN 50 G/1000ML
SOLUTION INTRAVENOUS CONTINUOUS PRN
Status: DISCONTINUED | OUTPATIENT
Start: 2018-11-09 | End: 2018-11-09

## 2018-11-09 RX ORDER — PROPOFOL 10 MG/ML
VIAL (ML) INTRAVENOUS
Status: DISCONTINUED | OUTPATIENT
Start: 2018-11-09 | End: 2018-11-09

## 2018-11-09 RX ORDER — AMOXICILLIN 250 MG
1 CAPSULE ORAL 2 TIMES DAILY
Status: DISCONTINUED | OUTPATIENT
Start: 2018-11-09 | End: 2018-11-11 | Stop reason: HOSPADM

## 2018-11-09 RX ORDER — ONDANSETRON 2 MG/ML
4 INJECTION INTRAMUSCULAR; INTRAVENOUS EVERY 12 HOURS PRN
Status: DISCONTINUED | OUTPATIENT
Start: 2018-11-09 | End: 2018-11-11 | Stop reason: HOSPADM

## 2018-11-09 RX ORDER — NEOSTIGMINE METHYLSULFATE 1 MG/ML
INJECTION, SOLUTION INTRAVENOUS
Status: DISCONTINUED | OUTPATIENT
Start: 2018-11-09 | End: 2018-11-09

## 2018-11-09 RX ADMIN — SODIUM CHLORIDE, SODIUM LACTATE, POTASSIUM CHLORIDE, AND CALCIUM CHLORIDE: 600; 310; 30; 20 INJECTION, SOLUTION INTRAVENOUS at 08:11

## 2018-11-09 RX ADMIN — SODIUM CHLORIDE, SODIUM LACTATE, POTASSIUM CHLORIDE, AND CALCIUM CHLORIDE: 600; 310; 30; 20 INJECTION, SOLUTION INTRAVENOUS at 03:11

## 2018-11-09 RX ADMIN — HYDROMORPHONE HYDROCHLORIDE 0.4 MG: 2 INJECTION INTRAMUSCULAR; INTRAVENOUS; SUBCUTANEOUS at 06:11

## 2018-11-09 RX ADMIN — MIDAZOLAM HYDROCHLORIDE 2 MG: 1 INJECTION, SOLUTION INTRAMUSCULAR; INTRAVENOUS at 12:11

## 2018-11-09 RX ADMIN — FENTANYL CITRATE 50 MCG: 50 INJECTION, SOLUTION INTRAMUSCULAR; INTRAVENOUS at 02:11

## 2018-11-09 RX ADMIN — SODIUM CHLORIDE, SODIUM LACTATE, POTASSIUM CHLORIDE, AND CALCIUM CHLORIDE: 600; 310; 30; 20 INJECTION, SOLUTION INTRAVENOUS at 12:11

## 2018-11-09 RX ADMIN — OXYCODONE HYDROCHLORIDE 5 MG: 5 TABLET ORAL at 06:11

## 2018-11-09 RX ADMIN — ROCURONIUM BROMIDE 20 MG: 10 INJECTION INTRAVENOUS at 01:11

## 2018-11-09 RX ADMIN — CEFAZOLIN 2 G: 330 INJECTION, POWDER, FOR SOLUTION INTRAMUSCULAR; INTRAVENOUS at 01:11

## 2018-11-09 RX ADMIN — FENTANYL CITRATE 100 MCG: 50 INJECTION, SOLUTION INTRAMUSCULAR; INTRAVENOUS at 12:11

## 2018-11-09 RX ADMIN — HYDROCODONE BITARTRATE AND ACETAMINOPHEN 1 TABLET: 10; 325 TABLET ORAL at 11:11

## 2018-11-09 RX ADMIN — FENTANYL CITRATE 50 MCG: 50 INJECTION, SOLUTION INTRAMUSCULAR; INTRAVENOUS at 03:11

## 2018-11-09 RX ADMIN — ROCURONIUM BROMIDE 10 MG: 10 INJECTION INTRAVENOUS at 02:11

## 2018-11-09 RX ADMIN — ALBUMIN (HUMAN): 2.5 SOLUTION INTRAVENOUS at 01:11

## 2018-11-09 RX ADMIN — SODIUM CHLORIDE AND POTASSIUM CHLORIDE: .9; .15 SOLUTION INTRAVENOUS at 09:11

## 2018-11-09 RX ADMIN — PROPOFOL 180 MG: 10 INJECTION, EMULSION INTRAVENOUS at 12:11

## 2018-11-09 RX ADMIN — ROCURONIUM BROMIDE 10 MG: 10 INJECTION INTRAVENOUS at 03:11

## 2018-11-09 RX ADMIN — CARBOXYMETHYLCELLULOSE SODIUM 2 DROP: 2.5 SOLUTION/ DROPS OPHTHALMIC at 12:11

## 2018-11-09 RX ADMIN — ACETAMINOPHEN 1000 MG: 10 INJECTION, SOLUTION INTRAVENOUS at 08:11

## 2018-11-09 RX ADMIN — FENTANYL CITRATE 50 MCG: 50 INJECTION, SOLUTION INTRAMUSCULAR; INTRAVENOUS at 04:11

## 2018-11-09 RX ADMIN — ACETAMINOPHEN 1000 MG: 10 INJECTION, SOLUTION INTRAVENOUS at 04:11

## 2018-11-09 RX ADMIN — LIDOCAINE HYDROCHLORIDE 75 MG: 20 INJECTION, SOLUTION INTRAVENOUS at 12:11

## 2018-11-09 RX ADMIN — SENNOSIDES AND DOCUSATE SODIUM 1 TABLET: 8.6; 5 TABLET ORAL at 09:11

## 2018-11-09 RX ADMIN — NEOSTIGMINE METHYLSULFATE 5 MG: 1 INJECTION INTRAVENOUS at 04:11

## 2018-11-09 RX ADMIN — SODIUM CHLORIDE, SODIUM LACTATE, POTASSIUM CHLORIDE, AND CALCIUM CHLORIDE: 600; 310; 30; 20 INJECTION, SOLUTION INTRAVENOUS at 02:11

## 2018-11-09 RX ADMIN — ONDANSETRON 4 MG: 2 INJECTION INTRAMUSCULAR; INTRAVENOUS at 04:11

## 2018-11-09 RX ADMIN — MUPIROCIN: 20 OINTMENT TOPICAL at 08:11

## 2018-11-09 RX ADMIN — HYDROMORPHONE HYDROCHLORIDE 0.4 MG: 2 INJECTION INTRAMUSCULAR; INTRAVENOUS; SUBCUTANEOUS at 05:11

## 2018-11-09 RX ADMIN — GLYCOPYRROLATE 0.8 MG: 0.2 INJECTION, SOLUTION INTRAMUSCULAR; INTRAVENOUS at 04:11

## 2018-11-09 RX ADMIN — ROCURONIUM BROMIDE 50 MG: 10 INJECTION INTRAVENOUS at 12:11

## 2018-11-09 NOTE — INTERVAL H&P NOTE
The patient has been examined and the H&P has been reviewed:    I concur with the findings and no changes have occurred since H&P was written.    Anesthesia/Surgery risks, benefits and alternative options discussed and understood by patient/family.    Proceed to OR for Davinci assisted laparoscopic hysterectomy, BSO, staging including omentectomy with mini laparotomy.    Keely Harris, NP-C, [facility]  GYN Oncology          Active Hospital Problems    Diagnosis  POA    Endometrial cancer [C54.1]  Yes      Resolved Hospital Problems   No resolved problems to display.

## 2018-11-09 NOTE — TRANSFER OF CARE
"Anesthesia Transfer of Care Note    Patient: Skylar Valdez    Procedure(s) Performed: Procedure(s) (LRB):  XI ROBOTIC HYSTERECTOMY CONVERTED TO OPEN 1336 (N/A)  LYMPHADENECTOMY, PELVIS (Bilateral)  HYSTERECTOMY, TOTAL, ABDOMINAL, WITH BILATERAL SALPINGO-OOPHORECTOMY  OMENTECTOMY    Patient location: PACU    Anesthesia Type: general    Transport from OR: Transported from OR on 2-3 L/min O2 by NC with adequate spontaneous ventilation    Post pain: adequate analgesia    Post assessment: no apparent anesthetic complications    Post vital signs: stable    Level of consciousness: awake, alert and oriented    Nausea/Vomiting: no nausea/vomiting    Complications: none    Transfer of care protocol was followed      Last vitals:   Visit Vitals  BP (!) 158/74 (BP Location: Left arm, Patient Position: Lying)   Pulse 90   Temp 36.6 °C (97.8 °F) (Oral)   Resp 16   Ht 5' 7" (1.702 m)   Wt 88.5 kg (194 lb 16 oz)   LMP 06/01/2010   SpO2 97%   Breastfeeding? No   BMI 30.54 kg/m²     "

## 2018-11-09 NOTE — ANESTHESIA POSTPROCEDURE EVALUATION
"Anesthesia Post Evaluation    Patient: Skylar Valdez    Procedure(s) Performed: Procedure(s) (LRB):  XI ROBOTIC HYSTERECTOMY CONVERTED TO OPEN 1336 (N/A)  LYMPHADENECTOMY, PELVIS (Bilateral)  HYSTERECTOMY, TOTAL, ABDOMINAL, WITH BILATERAL SALPINGO-OOPHORECTOMY  OMENTECTOMY    Final Anesthesia Type: general  Patient location during evaluation: PACU  Patient participation: Yes- Able to Participate  Level of consciousness: oriented and awake  Post-procedure vital signs: reviewed and stable  Pain management: adequate  Airway patency: patent  PONV status at discharge: No PONV  Anesthetic complications: no      Cardiovascular status: hemodynamically stable  Respiratory status: unassisted, spontaneous ventilation and room air  Hydration status: euvolemic  Follow-up not needed.        Visit Vitals  BP (!) 158/74 (BP Location: Left arm, Patient Position: Lying)   Pulse 90   Temp 36.6 °C (97.8 °F) (Oral)   Resp 16   Ht 5' 7" (1.702 m)   Wt 88.5 kg (194 lb 16 oz)   LMP 06/01/2010   SpO2 97%   Breastfeeding? No   BMI 30.54 kg/m²       Pain/Sarahi Score: Pain Assessment Performed: Yes (11/9/2018  5:23 PM)  Presence of Pain: non-verbal indicators absent (11/9/2018  5:23 PM)  Sarahi Score: 7 (11/9/2018  5:23 PM)        "

## 2018-11-10 PROBLEM — Z90.722 S/P TAH-BSO: Status: RESOLVED | Noted: 2018-11-09 | Resolved: 2018-11-10

## 2018-11-10 PROBLEM — Z90.79 S/P TAH-BSO: Status: RESOLVED | Noted: 2018-11-09 | Resolved: 2018-11-10

## 2018-11-10 PROBLEM — Z90.710 S/P TAH-BSO: Status: RESOLVED | Noted: 2018-11-09 | Resolved: 2018-11-10

## 2018-11-10 PROBLEM — E03.9 HYPOTHYROID: Status: ACTIVE | Noted: 2018-11-10

## 2018-11-10 LAB
BASOPHILS # BLD AUTO: 0.02 K/UL
BASOPHILS NFR BLD: 0.2 %
DIFFERENTIAL METHOD: ABNORMAL
EOSINOPHIL # BLD AUTO: 0 K/UL
EOSINOPHIL NFR BLD: 0.2 %
ERYTHROCYTE [DISTWIDTH] IN BLOOD BY AUTOMATED COUNT: 13.8 %
ESTIMATED AVG GLUCOSE: 114 MG/DL
HBA1C MFR BLD HPLC: 5.6 %
HCT VFR BLD AUTO: 36.2 %
HGB BLD-MCNC: 11.4 G/DL
LYMPHOCYTES # BLD AUTO: 1.9 K/UL
LYMPHOCYTES NFR BLD: 17.5 %
MCH RBC QN AUTO: 26.9 PG
MCHC RBC AUTO-ENTMCNC: 31.5 G/DL
MCV RBC AUTO: 85 FL
MONOCYTES # BLD AUTO: 0.8 K/UL
MONOCYTES NFR BLD: 6.9 %
NEUTROPHILS # BLD AUTO: 8.1 K/UL
NEUTROPHILS NFR BLD: 74.9 %
PLATELET # BLD AUTO: 263 K/UL
PMV BLD AUTO: 10.7 FL
POCT GLUCOSE: 115 MG/DL (ref 70–110)
POCT GLUCOSE: 146 MG/DL (ref 70–110)
RBC # BLD AUTO: 4.24 M/UL
WBC # BLD AUTO: 10.82 K/UL

## 2018-11-10 PROCEDURE — 85025 COMPLETE CBC W/AUTO DIFF WBC: CPT

## 2018-11-10 PROCEDURE — 94799 UNLISTED PULMONARY SVC/PX: CPT

## 2018-11-10 PROCEDURE — 99900035 HC TECH TIME PER 15 MIN (STAT)

## 2018-11-10 PROCEDURE — 63600175 PHARM REV CODE 636 W HCPCS: Performed by: STUDENT IN AN ORGANIZED HEALTH CARE EDUCATION/TRAINING PROGRAM

## 2018-11-10 PROCEDURE — 83036 HEMOGLOBIN GLYCOSYLATED A1C: CPT

## 2018-11-10 PROCEDURE — 25000003 PHARM REV CODE 250: Performed by: OBSTETRICS & GYNECOLOGY

## 2018-11-10 PROCEDURE — 25000003 PHARM REV CODE 250: Performed by: STUDENT IN AN ORGANIZED HEALTH CARE EDUCATION/TRAINING PROGRAM

## 2018-11-10 PROCEDURE — 11000001 HC ACUTE MED/SURG PRIVATE ROOM

## 2018-11-10 PROCEDURE — 94761 N-INVAS EAR/PLS OXIMETRY MLT: CPT

## 2018-11-10 PROCEDURE — 99024 POSTOP FOLLOW-UP VISIT: CPT | Mod: ,,, | Performed by: OBSTETRICS & GYNECOLOGY

## 2018-11-10 PROCEDURE — 36415 COLL VENOUS BLD VENIPUNCTURE: CPT

## 2018-11-10 RX ORDER — HYDROMORPHONE HYDROCHLORIDE 1 MG/ML
1 INJECTION, SOLUTION INTRAMUSCULAR; INTRAVENOUS; SUBCUTANEOUS EVERY 4 HOURS PRN
Status: DISCONTINUED | OUTPATIENT
Start: 2018-11-10 | End: 2018-11-11 | Stop reason: HOSPADM

## 2018-11-10 RX ORDER — ENOXAPARIN SODIUM 100 MG/ML
40 INJECTION SUBCUTANEOUS EVERY 24 HOURS
Status: DISCONTINUED | OUTPATIENT
Start: 2018-11-10 | End: 2018-11-11

## 2018-11-10 RX ORDER — CALCIUM CARBONATE 200(500)MG
500 TABLET,CHEWABLE ORAL DAILY PRN
Status: DISCONTINUED | OUTPATIENT
Start: 2018-11-10 | End: 2018-11-11 | Stop reason: HOSPADM

## 2018-11-10 RX ADMIN — HYDROCODONE BITARTRATE AND ACETAMINOPHEN 1 TABLET: 10; 325 TABLET ORAL at 10:11

## 2018-11-10 RX ADMIN — SENNOSIDES AND DOCUSATE SODIUM 1 TABLET: 8.6; 5 TABLET ORAL at 08:11

## 2018-11-10 RX ADMIN — LEVOTHYROXINE SODIUM 125 MCG: 25 TABLET ORAL at 05:11

## 2018-11-10 RX ADMIN — HYDROCODONE BITARTRATE AND ACETAMINOPHEN 1 TABLET: 10; 325 TABLET ORAL at 03:11

## 2018-11-10 RX ADMIN — HYDROCODONE BITARTRATE AND ACETAMINOPHEN 1 TABLET: 10; 325 TABLET ORAL at 02:11

## 2018-11-10 RX ADMIN — MUPIROCIN 1 G: 20 OINTMENT TOPICAL at 09:11

## 2018-11-10 RX ADMIN — ENOXAPARIN SODIUM 40 MG: 100 INJECTION SUBCUTANEOUS at 04:11

## 2018-11-10 RX ADMIN — HYDROCODONE BITARTRATE AND ACETAMINOPHEN 1 TABLET: 10; 325 TABLET ORAL at 06:11

## 2018-11-10 RX ADMIN — HYDROCODONE BITARTRATE AND ACETAMINOPHEN 1 TABLET: 10; 325 TABLET ORAL at 05:11

## 2018-11-10 RX ADMIN — CALCIUM CARBONATE 500 MG: 500 TABLET, CHEWABLE ORAL at 08:11

## 2018-11-10 RX ADMIN — SODIUM CHLORIDE AND POTASSIUM CHLORIDE: .9; .15 SOLUTION INTRAVENOUS at 04:11

## 2018-11-10 RX ADMIN — MUPIROCIN 1 G: 20 OINTMENT TOPICAL at 08:11

## 2018-11-10 RX ADMIN — LOSARTAN POTASSIUM 100 MG: 50 TABLET, FILM COATED ORAL at 08:11

## 2018-11-10 RX ADMIN — OXYCODONE HYDROCHLORIDE 5 MG: 5 TABLET ORAL at 04:11

## 2018-11-10 NOTE — HPI
Skylar Mejia José Miguel is a 68 y.o.  here for scheduled RA-TLH that was converted to Xlap/ROSE MARIE/BSO/OMX/staging for uterine sarcoma.

## 2018-11-10 NOTE — NURSING
Voided  Spontaneously 250cc, ambulating in room.  at bedside. Tolerating regular diet. Encouraged po fluids and increase ambulation as tolerated.

## 2018-11-10 NOTE — PLAN OF CARE
Problem: Patient Care Overview  Goal: Plan of Care Review  Outcome: Ongoing (interventions implemented as appropriate)  IS done on her own.  Will continue to monitor

## 2018-11-10 NOTE — HOSPITAL COURSE
11/09/2018: Admit to GYN oncology. To OR for planned procedures. TLH converted to Xlap/ROSE MARIE/BSO with staging due to uterine size and irregular contour necessitating laparotomy for removal of tissues. Patient tolerated the procedure well and was taken to PACU in good condition.   11/10/2018 Patient doing well. Pain was moderately controlled overnight. Bradshaw was recently removed. She has ambulated and is awaiting spontaneous voiding. Pain control has improved over the course of the day.   11/11/2018 POD #2. Doing well overall. Overnight, patient with elevated BPs with max . 5 mg hydralazine given IV with resolution. Home norvasc was restarted. Pain is well controlled. Patient is ambulating. Tolerating PO. Passing flatus. Voiding spontaneously. Will evaluate for discharge to home this afternoon.

## 2018-11-10 NOTE — PLAN OF CARE
Problem: Patient Care Overview  Goal: Plan of Care Review  Outcome: Ongoing (interventions implemented as appropriate)  Pt VSS and afebrile, free of falls, trauma, injury, and skin breakdowns, ambulates to , on RA. Pt family at bedside, pain controlled with medication. Pt in low locked bed, call light in reach, safety precautions maintained, will continue to monitor. Dressing to abdomen has small old dried drainage. Pt couch in place.

## 2018-11-10 NOTE — ASSESSMENT & PLAN NOTE
- Procedure complications: unable to be completed laparoscopically  - EBL: 350 cc  - IVF @ 125 cc/h; d/c when tolerating PO  - Diet: regular, tolerated  - Pain control: IV toradol/acetaminophen, oxy IR 5/10 mg, 0.5 mg dilaudid BTP  - In/Out: Bradshaw in place draining clear yellow urine; d/c Bradshaw in AM POD #1  - Bowel function: +flatus/-BM  - PRN: simethicone, zofran, phenergan, benadryl  - Heme: CBC 10.8/11.4/36.2/263  - PPX: ambulation encouraged, IS encouraged, lovenox ppx today, TEDs/SCDs in place    Disposition: Will plan to evaluate the patient as she meets her post-operative milestones. Will tentatively plan for discharge to home POD #2.

## 2018-11-10 NOTE — PLAN OF CARE
Problem: Patient Care Overview  Goal: Plan of Care Review  Outcome: Ongoing (interventions implemented as appropriate)  Patient on 2L nc.  Sats 98%. IS done. Pt. in no distress, will continue to monitor.

## 2018-11-10 NOTE — PROGRESS NOTES
[facility]  Obstetrics & Gynecology  Progress Note    Patient Name: Skylar Valdez  MRN: [id number]  Admission Date: 2018  Primary Care Provider: Holly Saavedra MD  Principal Problem: S/P ROSE MARIE-BSO    Subjective:     HPI:  Skylar Valdez is a 68 y.o.  here for scheduled RA-TLH that was converted to Xlap/ROSE MARIE/BSO/OMX/staging for uterine sarcoma.     Interval History:   POD #1Patient doing well. Pain was moderately controlled overnight. Bradshaw was recently removed. She has ambulated and is awaiting spontaneous voiding. Pain control has improved over the course of the day.     Scheduled Meds:   levothyroxine  125 mcg Oral Before breakfast    losartan  100 mg Oral Daily    mupirocin  1 g Nasal BID    senna-docusate 8.6-50 mg  1 tablet Oral BID     Continuous Infusions:   0/9% NACL & POTASSIUM CHLORIDE 20 MEQ/L 125 mL/hr at 11/10/18 0414     PRN Meds:dextrose 50%, dextrose 50%, glucagon (human recombinant), glucose, glucose, HYDROcodone-acetaminophen, HYDROmorphone, insulin aspart U-100, ondansetron, oxyCODONE, promethazine (PHENERGAN) IVPB    Review of patient's allergies indicates:  No Known Allergies    Objective:     Vital Signs (Most Recent):  Temp: 98.5 °F (36.9 °C) (11/10/18 0726)  Pulse: 89 (11/10/18 0726)  Resp: 19 (11/10/18 0726)  BP: (!) 169/75 (11/10/18 0726)  SpO2: (!) 93 % (11/10/18 0726) Vital Signs (24h Range):  Temp:  [97.8 °F (36.6 °C)-98.9 °F (37.2 °C)] 98.5 °F (36.9 °C)  Pulse:  [] 89  Resp:  [16-19] 19  SpO2:  [93 %-100 %] 93 %  BP: (147-170)/(68-80) 169/75     Weight: 88.5 kg (195 lb)  Body mass index is 30.54 kg/m².  Patient's last menstrual period was 2010.    I&O (Last 24H):    Intake/Output Summary (Last 24 hours) at 11/10/2018 1426  Last data filed at 11/10/2018 0414  Gross per 24 hour   Intake 2691.67 ml   Output 2475 ml   Net 216.67 ml       Physical Exam:   Constitutional: She is oriented to person, place, and time. She appears  well-developed and well-nourished.    HENT:   Head: Normocephalic and atraumatic.    Eyes: Conjunctivae and EOM are normal. Pupils are equal, round, and reactive to light.    Neck: Normal range of motion. Neck supple.    Cardiovascular: Normal rate, regular rhythm and normal heart sounds.     Pulmonary/Chest: Effort normal and breath sounds normal. No respiratory distress.        Abdominal: Soft. Bowel sounds are normal. She exhibits distension and abdominal incision. There is tenderness. There is no rebound and no guarding.   Midline vertical incision is c/d/i and bandaged with moderate shadowing. TTP is moderate. Mild distention with excellent bowel sounds.      Genitourinary:   Genitourinary Comments: Bradshaw in place at the time of exam, draining clear, yellow urine           Musculoskeletal: Normal range of motion.       Neurological: She is alert and oriented to person, place, and time.    Skin: Skin is warm and dry. No rash noted.    Psychiatric: She has a normal mood and affect. Her behavior is normal. Judgment and thought content normal.     Laboratory:  Recent Labs   Lab 11/10/18  0507   WBC 10.82   HGB 11.4*   HCT 36.2*   MCV 85          Diagnostic Results:  Final pathology pending    Assessment/Plan:     * S/P ROSE MARIE-BSO/omentectomy/staging LND 11/10/2018    - Procedure complications: unable to be completed laparoscopically  - EBL: 350 cc  - IVF @ 125 cc/h; d/c when tolerating PO  - Diet: regular, tolerated  - Pain control: IV toradol/acetaminophen, oxy IR 5/10 mg, 0.5 mg dilaudid BTP  - In/Out: Bradshaw in place draining clear yellow urine; d/c Bradshaw in AM POD #1  - Bowel function: +flatus/-BM  - PRN: simethicone, zofran, phenergan, benadryl  - Heme: CBC 10.8/11.4/36.2/263  - PPX: ambulation encouraged, IS encouraged, lovenox ppx today, TEDs/SCDs in place    Disposition: Will plan to evaluate the patient as she meets her post-operative milestones. Will tentatively plan for discharge to home POD #2.          Hypothyroid    - continue home synthroid     Type 2 diabetes mellitus treated without insulin    - SSI     Essential hypertension    - BP: (147-170)/(68-80) 169/75   - continue home losartan 100 mg         Roxanna Khalil MD  Obstetrics & Gynecology  [facility]

## 2018-11-10 NOTE — SUBJECTIVE & OBJECTIVE
Interval History:   POD #1Patient doing well. Pain was moderately controlled overnight. Bradshaw was recently removed. She has ambulated and is awaiting spontaneous voiding. Pain control has improved over the course of the day.     Scheduled Meds:   levothyroxine  125 mcg Oral Before breakfast    losartan  100 mg Oral Daily    mupirocin  1 g Nasal BID    senna-docusate 8.6-50 mg  1 tablet Oral BID     Continuous Infusions:   0/9% NACL & POTASSIUM CHLORIDE 20 MEQ/L 125 mL/hr at 11/10/18 0414     PRN Meds:dextrose 50%, dextrose 50%, glucagon (human recombinant), glucose, glucose, HYDROcodone-acetaminophen, HYDROmorphone, insulin aspart U-100, ondansetron, oxyCODONE, promethazine (PHENERGAN) IVPB    Review of patient's allergies indicates:  No Known Allergies    Objective:     Vital Signs (Most Recent):  Temp: 98.5 °F (36.9 °C) (11/10/18 0726)  Pulse: 89 (11/10/18 0726)  Resp: 19 (11/10/18 0726)  BP: (!) 169/75 (11/10/18 0726)  SpO2: (!) 93 % (11/10/18 0726) Vital Signs (24h Range):  Temp:  [97.8 °F (36.6 °C)-98.9 °F (37.2 °C)] 98.5 °F (36.9 °C)  Pulse:  [] 89  Resp:  [16-19] 19  SpO2:  [93 %-100 %] 93 %  BP: (147-170)/(68-80) 169/75     Weight: 88.5 kg (195 lb)  Body mass index is 30.54 kg/m².  Patient's last menstrual period was 06/01/2010.    I&O (Last 24H):    Intake/Output Summary (Last 24 hours) at 11/10/2018 1426  Last data filed at 11/10/2018 0414  Gross per 24 hour   Intake 2691.67 ml   Output 2475 ml   Net 216.67 ml       Physical Exam:   Constitutional: She is oriented to person, place, and time. She appears well-developed and well-nourished.    HENT:   Head: Normocephalic and atraumatic.    Eyes: Conjunctivae and EOM are normal. Pupils are equal, round, and reactive to light.    Neck: Normal range of motion. Neck supple.    Cardiovascular: Normal rate, regular rhythm and normal heart sounds.     Pulmonary/Chest: Effort normal and breath sounds normal. No respiratory distress.        Abdominal: Soft.  Bowel sounds are normal. She exhibits distension and abdominal incision. There is tenderness. There is no rebound and no guarding.   Midline vertical incision is c/d/i and bandaged with moderate shadowing. TTP is moderate. Mild distention with excellent bowel sounds.      Genitourinary:   Genitourinary Comments: Bradshaw in place at the time of exam, draining clear, yellow urine           Musculoskeletal: Normal range of motion.       Neurological: She is alert and oriented to person, place, and time.    Skin: Skin is warm and dry. No rash noted.    Psychiatric: She has a normal mood and affect. Her behavior is normal. Judgment and thought content normal.     Laboratory:  Recent Labs   Lab 11/10/18  0507   WBC 10.82   HGB 11.4*   HCT 36.2*   MCV 85          Diagnostic Results:  Final pathology pending

## 2018-11-10 NOTE — PLAN OF CARE
Problem: Patient Care Overview  Goal: Plan of Care Review  Outcome: Ongoing (interventions implemented as appropriate)  No significant events this shift. Free from falls, skin breakdown or injury. Resp even and unlabored on room air. Tolerating regular diet. Ambulating in halls and around room. Voiding spontaneously since couch removal. Passing flatus. Pain well controlled with po pain medication. Denies needs. Bed in lowest locked position, side rails elevated, cb in reach. Purposeful rounding done every hour.

## 2018-11-10 NOTE — NURSING
Removed 16fr couch catheter, 10cc removed from bulb prior to pulling couch. Tolerated well. Instructed pt to call for 1st void. 1400cc of clear yellow urine noted in drainage bag. Patient also stated that she doesn't check her blood glucose at home and doesn't want to be stuck, that her DM is diet controlled. Accuchecks d/c'd. Encouraged po fluids and ambulation.

## 2018-11-11 VITALS
HEART RATE: 89 BPM | DIASTOLIC BLOOD PRESSURE: 61 MMHG | HEIGHT: 67 IN | SYSTOLIC BLOOD PRESSURE: 129 MMHG | BODY MASS INDEX: 30.61 KG/M2 | TEMPERATURE: 98 F | RESPIRATION RATE: 18 BRPM | OXYGEN SATURATION: 96 % | WEIGHT: 195 LBS

## 2018-11-11 LAB — POCT GLUCOSE: 140 MG/DL (ref 70–110)

## 2018-11-11 PROCEDURE — 94761 N-INVAS EAR/PLS OXIMETRY MLT: CPT

## 2018-11-11 PROCEDURE — 99024 POSTOP FOLLOW-UP VISIT: CPT | Mod: ,,, | Performed by: OBSTETRICS & GYNECOLOGY

## 2018-11-11 PROCEDURE — 25000003 PHARM REV CODE 250: Performed by: STUDENT IN AN ORGANIZED HEALTH CARE EDUCATION/TRAINING PROGRAM

## 2018-11-11 PROCEDURE — 63600175 PHARM REV CODE 636 W HCPCS: Performed by: STUDENT IN AN ORGANIZED HEALTH CARE EDUCATION/TRAINING PROGRAM

## 2018-11-11 RX ORDER — ENOXAPARIN SODIUM 100 MG/ML
40 INJECTION SUBCUTANEOUS ONCE
Status: COMPLETED | OUTPATIENT
Start: 2018-11-11 | End: 2018-11-11

## 2018-11-11 RX ORDER — OXYCODONE AND ACETAMINOPHEN 5; 325 MG/1; MG/1
1 TABLET ORAL EVERY 4 HOURS PRN
Qty: 30 TABLET | Refills: 0 | Status: SHIPPED | OUTPATIENT
Start: 2018-11-11 | End: 2018-12-04

## 2018-11-11 RX ORDER — IBUPROFEN 600 MG/1
600 TABLET ORAL 3 TIMES DAILY
Qty: 30 TABLET | Refills: 1 | Status: SHIPPED | OUTPATIENT
Start: 2018-11-11 | End: 2019-07-16 | Stop reason: SDUPTHER

## 2018-11-11 RX ORDER — ENOXAPARIN SODIUM 100 MG/ML
40 INJECTION SUBCUTANEOUS DAILY
Qty: 8.4 ML | Refills: 0 | Status: SHIPPED | OUTPATIENT
Start: 2018-11-11 | End: 2018-12-02

## 2018-11-11 RX ORDER — AMLODIPINE BESYLATE 5 MG/1
10 TABLET ORAL DAILY
Status: DISCONTINUED | OUTPATIENT
Start: 2018-11-11 | End: 2018-11-11 | Stop reason: HOSPADM

## 2018-11-11 RX ORDER — HYDROCODONE BITARTRATE AND ACETAMINOPHEN 5; 325 MG/1; MG/1
1 TABLET ORAL EVERY 4 HOURS PRN
Status: DISCONTINUED | OUTPATIENT
Start: 2018-11-11 | End: 2018-11-11 | Stop reason: HOSPADM

## 2018-11-11 RX ORDER — HYDRALAZINE HYDROCHLORIDE 20 MG/ML
INJECTION INTRAMUSCULAR; INTRAVENOUS
Status: DISPENSED
Start: 2018-11-11 | End: 2018-11-11

## 2018-11-11 RX ORDER — IBUPROFEN 600 MG/1
600 TABLET ORAL EVERY 6 HOURS
Status: DISCONTINUED | OUTPATIENT
Start: 2018-11-11 | End: 2018-11-11 | Stop reason: HOSPADM

## 2018-11-11 RX ORDER — HYDRALAZINE HYDROCHLORIDE 20 MG/ML
5 INJECTION INTRAMUSCULAR; INTRAVENOUS ONCE
Status: COMPLETED | OUTPATIENT
Start: 2018-11-11 | End: 2018-11-11

## 2018-11-11 RX ADMIN — HYDRALAZINE HYDROCHLORIDE 5 MG: 20 INJECTION INTRAMUSCULAR; INTRAVENOUS at 01:11

## 2018-11-11 RX ADMIN — HYDROCODONE BITARTRATE AND ACETAMINOPHEN 1 TABLET: 10; 325 TABLET ORAL at 03:11

## 2018-11-11 RX ADMIN — AMLODIPINE BESYLATE 10 MG: 5 TABLET ORAL at 08:11

## 2018-11-11 RX ADMIN — ENOXAPARIN SODIUM 40 MG: 40 INJECTION SUBCUTANEOUS at 10:11

## 2018-11-11 RX ADMIN — HYDROCODONE BITARTRATE AND ACETAMINOPHEN 1 TABLET: 10; 325 TABLET ORAL at 11:11

## 2018-11-11 RX ADMIN — LEVOTHYROXINE SODIUM 125 MCG: 25 TABLET ORAL at 06:11

## 2018-11-11 RX ADMIN — IBUPROFEN 600 MG: 600 TABLET ORAL at 06:11

## 2018-11-11 RX ADMIN — MUPIROCIN 1 G: 20 OINTMENT TOPICAL at 08:11

## 2018-11-11 RX ADMIN — LOSARTAN POTASSIUM 100 MG: 50 TABLET, FILM COATED ORAL at 08:11

## 2018-11-11 RX ADMIN — SENNOSIDES AND DOCUSATE SODIUM 1 TABLET: 8.6; 5 TABLET ORAL at 08:11

## 2018-11-11 RX ADMIN — HYDROCODONE BITARTRATE AND ACETAMINOPHEN 1 TABLET: 10; 325 TABLET ORAL at 08:11

## 2018-11-11 RX ADMIN — IBUPROFEN 600 MG: 600 TABLET ORAL at 11:11

## 2018-11-11 NOTE — SUBJECTIVE & OBJECTIVE
Interval History:   POD #2. Doing well overall. Overnight, patient with elevated BPs with max . 5 mg hydralazine given IV with resolution. Home norvasc was restarted. Pain is well controlled. Patient is ambulating. Tolerating PO. Passing flatus. Voiding spontaneously. Will evaluate for discharge to home this afternoon.     Scheduled Meds:   amLODIPine  10 mg Oral Daily    enoxaparin  40 mg Subcutaneous Daily    hydrALAZINE        levothyroxine  125 mcg Oral Before breakfast    losartan  100 mg Oral Daily    mupirocin  1 g Nasal BID    senna-docusate 8.6-50 mg  1 tablet Oral BID     Continuous Infusions:   0/9% NACL & POTASSIUM CHLORIDE 20 MEQ/L 125 mL/hr at 11/10/18 0414     PRN Meds:calcium carbonate, dextrose 50%, dextrose 50%, glucagon (human recombinant), glucose, glucose, HYDROcodone-acetaminophen, HYDROmorphone, insulin aspart U-100, ondansetron, oxyCODONE, promethazine (PHENERGAN) IVPB    Review of patient's allergies indicates:  No Known Allergies    Objective:     Vital Signs (Most Recent):  Temp: 98.3 °F (36.8 °C) (11/11/18 0004)  Pulse: 93 (11/11/18 0004)  Resp: 18 (11/11/18 0004)  BP: (!) 191/91 (11/11/18 0033)  SpO2: 95 % (11/11/18 0033) Vital Signs (24h Range):  Temp:  [98.3 °F (36.8 °C)-99.5 °F (37.5 °C)] 98.3 °F (36.8 °C)  Pulse:  [86-93] 93  Resp:  [16-20] 18  SpO2:  [92 %-98 %] 95 %  BP: (158-191)/(70-91) 191/91     Weight: 88.5 kg (195 lb)  Body mass index is 30.54 kg/m².  Patient's last menstrual period was 06/01/2010.    I&O (Last 24H):    Intake/Output Summary (Last 24 hours) at 11/11/2018 0132  Last data filed at 11/10/2018 1700  Gross per 24 hour   Intake 3207.5 ml   Output 3950 ml   Net -742.5 ml     Physical Exam:   Constitutional: She is oriented to person, place, and time. She appears well-developed and well-nourished.    HENT:   Head: Normocephalic and atraumatic.    Eyes: Conjunctivae and EOM are normal. Pupils are equal, round, and reactive to light.    Neck: Normal  range of motion. Neck supple.    Cardiovascular: Normal rate, regular rhythm and normal heart sounds.     Pulmonary/Chest: Effort normal and breath sounds normal. No respiratory distress.        Abdominal: Soft. Bowel sounds are normal. She exhibits distension and abdominal incision. There is tenderness. There is no rebound and no guarding.   Midline vertical incision is c/d/i and bandaged with moderate shadowing. TTP is moderate. Mild distention with bowel sounds present throughout     Genitourinary:   Genitourinary Comments: deferred           Musculoskeletal: Normal range of motion.       Neurological: She is alert and oriented to person, place, and time.    Skin: Skin is warm and dry. No rash noted.    Psychiatric: She has a normal mood and affect. Her behavior is normal. Judgment and thought content normal.     Laboratory:  Recent Labs   Lab 11/10/18  0507   WBC 10.82   HGB 11.4*   HCT 36.2*   MCV 85         Diagnostic Results:  Pathology pending

## 2018-11-11 NOTE — PROGRESS NOTES
[facility]  Obstetrics & Gynecology  Progress Note    Patient Name: Skylar Valdez  MRN: [id number]  Admission Date: 2018  Primary Care Provider: Holly Saavedra MD  Principal Problem: S/P ROSE MARIE-BSO    Subjective:     HPI:  Skylar Valdez is a 68 y.o.  here for scheduled RA-TLH that was converted to Xlap/ROSE MARIE/BSO/OMX/staging for uterine sarcoma.     Interval History:   POD #2. Doing well overall. Overnight, patient with elevated BPs with max . 5 mg hydralazine given IV with resolution. Home norvasc was restarted. Pain is well controlled. Patient is ambulating. Tolerating PO. Passing flatus. Voiding spontaneously. Will evaluate for discharge to home this afternoon.     Scheduled Meds:   amLODIPine  10 mg Oral Daily    enoxaparin  40 mg Subcutaneous Daily    hydrALAZINE        levothyroxine  125 mcg Oral Before breakfast    losartan  100 mg Oral Daily    mupirocin  1 g Nasal BID    senna-docusate 8.6-50 mg  1 tablet Oral BID     Continuous Infusions:   0/9% NACL & POTASSIUM CHLORIDE 20 MEQ/L 125 mL/hr at 11/10/18 0414     PRN Meds:calcium carbonate, dextrose 50%, dextrose 50%, glucagon (human recombinant), glucose, glucose, HYDROcodone-acetaminophen, HYDROmorphone, insulin aspart U-100, ondansetron, oxyCODONE, promethazine (PHENERGAN) IVPB    Review of patient's allergies indicates:  No Known Allergies    Objective:     Vital Signs (Most Recent):  Temp: 98.3 °F (36.8 °C) (18)  Pulse: 93 (18)  Resp: 18 (18)  BP: (!) 191/91 (18)  SpO2: 95 % (18) Vital Signs (24h Range):  Temp:  [98.3 °F (36.8 °C)-99.5 °F (37.5 °C)] 98.3 °F (36.8 °C)  Pulse:  [86-93] 93  Resp:  [16-20] 18  SpO2:  [92 %-98 %] 95 %  BP: (158-191)/(70-91) 191/91     Weight: 88.5 kg (195 lb)  Body mass index is 30.54 kg/m².  Patient's last menstrual period was 2010.    I&O (Last 24H):    Intake/Output Summary (Last 24 hours) at 2018  0132  Last data filed at 11/10/2018 1700  Gross per 24 hour   Intake 3207.5 ml   Output 3950 ml   Net -742.5 ml     Physical Exam:   Constitutional: She is oriented to person, place, and time. She appears well-developed and well-nourished.    HENT:   Head: Normocephalic and atraumatic.    Eyes: Conjunctivae and EOM are normal. Pupils are equal, round, and reactive to light.    Neck: Normal range of motion. Neck supple.    Cardiovascular: Normal rate, regular rhythm and normal heart sounds.     Pulmonary/Chest: Effort normal and breath sounds normal. No respiratory distress.        Abdominal: Soft. Bowel sounds are normal. She exhibits distension and abdominal incision. There is tenderness. There is no rebound and no guarding.   Midline vertical incision is c/d/i and bandaged with moderate shadowing. TTP is moderate. Mild distention with bowel sounds present throughout     Genitourinary:   Genitourinary Comments: deferred           Musculoskeletal: Normal range of motion.       Neurological: She is alert and oriented to person, place, and time.    Skin: Skin is warm and dry. No rash noted.    Psychiatric: She has a normal mood and affect. Her behavior is normal. Judgment and thought content normal.     Laboratory:  Recent Labs   Lab 11/10/18  0507   WBC 10.82   HGB 11.4*   HCT 36.2*   MCV 85         Diagnostic Results:  Pathology pending    Assessment/Plan:     * S/P ROSE MARIE-BSO/omentectomy/staging LND 11/10/2018    - Procedure complications: unable to be completed laparoscopically  - Diet: regular, tolerated  - Pain control: adequate with PO meds  - In/Out: voiding spontaneously  - Bowel function: +flatus  - PRN: simethicone, zofran, phenergan, benadryl  - Heme: CBC 10.8/11.4/36.2/263  - PPX: ambulation encouraged, IS encouraged, lovenox ppx, TEDs/SCDs in place    Disposition: Will plan to evaluate the patient as she meets her post-operative milestones. Will tentatively plan for discharge to home POD #2. She will  need to go home with 21 days of lovenox for ppx.        Hypothyroid    - continue home synthroid     Type 2 diabetes mellitus treated without insulin    - SSI     Essential hypertension    - BP: (158-191)/(70-91) 191/91    - continue home losartan 100 mg, norasc 10 mg         Roxanna Khalil MD  Obstetrics & Gynecology  [facility]

## 2018-11-11 NOTE — ASSESSMENT & PLAN NOTE
- Procedure complications: unable to be completed laparoscopically  - Diet: regular, tolerated  - Pain control: adequate with PO meds  - In/Out: voiding spontaneously  - Bowel function: +flatus  - PRN: simethicone, zofran, phenergan, benadryl  - Heme: CBC 10.8/11.4/36.2/263  - PPX: ambulation encouraged, IS encouraged, lovenox ppx, TEDs/SCDs in place    Disposition: Will plan to evaluate the patient as she meets her post-operative milestones. Will tentatively plan for discharge to home POD #2. She will need to go home with 21 days of lovenox for ppx.

## 2018-11-11 NOTE — DISCHARGE SUMMARY
[facility]  Obstetrics & Gynecology  Discharge Summary    Patient Name: Skylar Valdez  MRN: [id number]  Admission Date: 2018  Hospital Length of Stay: 2 days  Discharge Date and Time: 11/10/18  Attending Physician: Grace Stanley MD   Discharging Provider: Collin Bonilla MD  Primary Care Provider: Holly Saavedra MD    HPI:   Skylar Valdez is a 68 y.o.  here for scheduled RA-TLH that was converted to Xlap/ROSE MARIE/BSO/OMX/staging for uterine sarcoma.            Hospital Course:   Uncomplicated post op course. Stable for discharge.     Procedure(s) (LRB):  XI ROBOTIC HYSTERECTOMY CONVERTED TO OPEN 1336 (N/A)  LYMPHADENECTOMY, PELVIS (Bilateral)  HYSTERECTOMY, TOTAL, ABDOMINAL, WITH BILATERAL SALPINGO-OOPHORECTOMY  OMENTECTOMY     Consults:     Significant Diagnostic Studies: Labs: All labs within the past 24 hours have been reviewed    Pending Diagnostic Studies:     None        Final Active Diagnoses:    Diagnosis Date Noted POA    PRINCIPAL PROBLEM:  S/P ROSE MARIE-BSO/omentectomy/staging LND 11/10/2018 [Z90.710, Z90.722, Z90.79] 2018 Not Applicable    Hypothyroid [E03.9] 11/10/2018 Yes    Endometrial cancer [C54.1] 2018 Yes    Essential hypertension [I10] 2016 Yes    Type 2 diabetes mellitus treated without insulin [E11.9] 2016 Yes      Problems Resolved During this Admission:        Discharged Condition: good    Disposition:     Follow Up:  Follow-up Information     Grace Stanley MD In 6 weeks.    Specialty:  Gynecologic Oncology  Why:  Post-op checkup  Contact information:  [address] [facility] [address]  [phone]                 Patient Instructions:      Diet Adult Regular     Notify your health care provider if you experience any of the following:  increased confusion or weakness     Notify your health care provider if you experience any of the following:  persistent dizziness, light-headedness, or visual disturbances      Notify your health care provider if you experience any of the following:  worsening rash     Notify your health care provider if you experience any of the following:  severe persistent headache     Notify your health care provider if you experience any of the following:  difficulty breathing or increased cough     Notify your health care provider if you experience any of the following:  redness, tenderness, or signs of infection (pain, swelling, redness, odor or green/yellow discharge around incision site)     Notify your health care provider if you experience any of the following:  persistent nausea and vomiting or diarrhea     Notify your health care provider if you experience any of the following:  temperature >100.4     Notify your health care provider if you experience any of the following:  severe uncontrolled pain     No dressing needed     Activity as tolerated     Medications:  Reconciled Home Medications:      Medication List      START taking these medications    enoxaparin 40 mg/0.4 mL Syrg  Commonly known as:  LOVENOX  Inject 0.4 mLs (40 mg total) into the skin once daily. for 21 days     ibuprofen 600 MG tablet  Commonly known as:  ADVIL,MOTRIN  Take 1 tablet (600 mg total) by mouth 3 (three) times daily.     oxyCODONE-acetaminophen 5-325 mg per tablet  Commonly known as:  PERCOCET  Take 1 tablet by mouth every 4 (four) hours as needed for Pain.        CONTINUE taking these medications    amLODIPine 10 MG tablet  Commonly known as:  NORVASC  Take 1 tablet (10 mg total) by mouth once daily.     losartan 100 MG tablet  Commonly known as:  COZAAR  once daily.     metFORMIN 500 MG tablet  Commonly known as:  GLUCOPHAGE  Take 1 tablet (500 mg total) by mouth 2 (two) times daily with meals.     SYNTHROID 125 MCG tablet  Generic drug:  levothyroxine  Take by mouth before breakfast.     TRADJENTA 5 mg Tab tablet  Generic drug:  linagliptin  once daily.            Collin Bonilla MD  Obstetrics &  Gynecology  [facility]

## 2018-11-11 NOTE — NURSING
Pt and family are anxious to discharge. Spoke with Resident, ok'd to go. Meeting all discharge requirements. Ambulating in halls, voiding spontaneously, passing flatus, tolerating regular diet and pain well controlled with po pain medication as needed. PIV removed, catheter tip intact. Dressing applied. Discharge teaching done at bedside with patient and family members, verbalized understanding. Medications reviewed, rx x3 given to patient. Appointment also given. All belongings packed, will d/c home per w/c

## 2018-11-11 NOTE — PLAN OF CARE
Problem: Patient Care Overview  Goal: Plan of Care Review  Outcome: Ongoing (interventions implemented as appropriate)  Patient in no apparent distress. Sat's 94  % on room air. IS done . Will continue to monitor.

## 2018-11-11 NOTE — PLAN OF CARE
Problem: Patient Care Overview  Goal: Plan of Care Review  Outcome: Ongoing (interventions implemented as appropriate)  Pt free of trauma, falls, and injury. Pt VV were being monitored throughout the night due to elevated BP. Medication intervention was given per MD order. Pt free of skin breakdown. Pt dressing is  dry and intact with small amount of dry drainage. Pt complained of chest discomfort. MD notified and intervention/ orders were put in place. Pt has been eating and voiding adequately throughout shift. Purposeful rounding done. Pt has call light in reach, bed alarm on, bed brakes on, side rails up x2, bed in low position, TEDs/SCDs on, IS at bedside, and nonskid socks on. Pt lying in bed in no distress.  at bedside. Will continue to monitor.

## 2018-11-12 LAB — POCT GLUCOSE: 86 MG/DL (ref 70–110)

## 2018-11-13 ENCOUNTER — TELEPHONE (OUTPATIENT)
Dept: GYNECOLOGIC ONCOLOGY | Facility: CLINIC | Age: 68
End: 2018-11-13

## 2018-11-13 NOTE — TELEPHONE ENCOUNTER
Regarding: RE:Reminder for Upcoming Procedure  Contact: [phone]  ----- Message from [facility], System Message sent at 11/12/2018  8:55 PM CST -----    Dr. Stanley...    I was wondering when I will be able to  the leave of absence paperwork I gave to you while I was in the hospital that is required by my employer in order for me to be approved for short term disability? Please let me know because I need to turn the forms in because I only had a week of vacation left... which I used this week so they will have to use short term disability for any additional weeks needed while I recover.  Please let me know,  Skylar  ----- Message -----  From: Grace Stanley MD  Sent: 11/2/2018  8:30 AM CDT  To: Skylar Valdez  Subject: Reminder for Upcoming Procedure  [facility] [address] [place] [address]    11/02/2018      Dear your,      This is a reminder for your upcoming procedure with Grace Stanley MD on 11/9/2018. We will contact you again before the day of your procedure with your scheduled arrival time.       If you have questions or scheduling concerns, you can contact your physicians office:   Grace Stanley MD  Phone Number: [phone]      Sincerely,     [facility] [address] [place] [address]

## 2018-11-13 NOTE — TELEPHONE ENCOUNTER
Spoke with pt. Pt informed she will need to contact the disability office for FMLA status. Pt given phone number to the disability office.

## 2018-11-15 NOTE — OP NOTE
DATE OF PROCEDURE:  11/09/2018     SURGEON:  Grace Stanley M.D.     ASSISTANTS: DANNY Perez, [facility]-- No qualified resident was available for the procedure. Samara Head MD (RES) and Roxanna Khalil MD (RES).      PREOPERATIVE DIAGNOSIS:   1. Endometrial stromal sarcoma  2. Obesity    POSTOPERATIVE DIAGNOSES:    1. Endometrial stromal sarcoma  2. Obesity      PROCEDURE PERFORMED:  Robotic converted to total abdominal hysterectomy,   bilateral salpingo-oophorectomy, bilateral pelvic lymph node dissection, omentectomy.      ANESTHESIA:  General endotracheal anesthesia.     SPECIMENS REMOVED:  1.  Uterus and cervix.  2.  Bilateral fallopian tubes and ovaries.  3.  Bilateral pelvic lymph nodes.  4.  Omentum      ESTIMATED BLOOD LOSS:  300 mL.     COMPLICATIONS:  None.     FINDINGS: 15 week size uterus with multiple leimomyoma. Normal fallopian tubes and ovaries bilaterally. 1-2mm omental implant concerning for disease. Otherwise no gross visible intraperitoneal disease.      PROCEDURE IN DETAIL:  The patient was taken to the Operating Room.  Informed consent had been obtained.  She underwent general endotracheal anesthesia without difficulty, was prepped and draped in the normal sterile fashion in a dorsal lithotomy position.  Timeout was performed.  All parties agreed to the planned procedure.  Perioperative antibiotics were administered.  Bradshaw catheter was placed under sterile conditions.  The VCare uterine manipulator was secured in place in a standard fashion for uterine manipulation.  Gloves were changed and attention was turned to the patient's abdomen.       The umbilicus was inverted. Veress needle was gently inserted.  Intra-abdominal placement was confirmed with low CO2 pressure and water drop test.  Abdomen was insufflated and pneumoperitoneum was obtained.  Skin incision was made superior to the umbilicus.  Robotic trocar was introduced.  Intra-abdominal placement was confirmed.   Additional trocars were placed, 2 robotic trocars to the left of the camera, 1 robotic trocar to the right of the camera and an additional 8 mm assist port to the right of the camera.  The patient was placed in steep Trendelenburg. Survey of the abdomen and pelvis revealed the above findings. The uterus was large and bulky with poor visualization of the posterior cul de sac. Due to uterine size decision was made to convert to open procedure.      Midline vertical skin incision was made with a scalpel and carried down to the underlying layer of fascia. The fascia was incised in the midline and extended superiorly and inferiorly.  The rectus muscles were divided in the midline.  The peritoneum was identified, tented up with two tonsil clamps, and entered sharply with Metzenbaum scissors. The peritoneal incision was extended superiorly and inferiorly.      Bookwalter retractor was assembled and bowel was packed away with moist laparotomy sponges. We then identified the bilateral round ligaments cauterized and transected them. This facilitated access to the retroperitoneum.  The retroperitoneum was then opened. The bilateral IP ligaments were then skeletonized, doubly clamped with Raffi hysterectomy clamps, transected, and doubly suture ligated with good visualization of the ureter beneath. The vesicouterine peritoneum was tented up, proper plane for the bladder flap was identified, and the bladder was gently reflected off the anterior aspect of the uterus and cervix to the level of the cervicovaginal junction. The bilateral uterine arteries were then skeletonized.  These were clamped with Raffi hysterectomy clamps, transected, and suture ligated. The remainder of the uterosacral and cardinal ligaments were then also serially clamped, transected, and suture ligated. We then used two curved Zeplin hysterectomy clamps to come across the upper vagina. The uterus, cervix, and bilateral fallopian tubes and ovaries were  then amputated and handed off to pathology. The vaginal cuff was closed with an 0 Vicryl suture in a running locked fashion.                 We then proceeded with lymphadenectomy.  Pararectal and paravesical spaces were opened  bilaterally.  All fibrofatty tissue was removed within defined boundaries for the pelvic lymphadenectomy including laterally the psoas muscle and the genitofemoral nerve, medially the superior vesicle artery and the ureter, cephalad the midpoint of the bifurcation of the common iliac arteries, caudad deep circumflex iliac vein and the deep boundary bilaterally the obturator   nerve.     We then proceeded with omentectomy.  The superior aspect of the abdominal incision was then extended to allow access to the transverse colon.  The avascular plane along the transverse colon was released, lesser sac was entered and the omentum was serially dissected, cauterized and transected along the greater curvature of the stomach from the hepatic to the splenic flexure.    At this point the procedure was deemed complete. The abdomen and pelvis were copiously irrigated, cleared of all clot and debris, and rendered hemostatic. Ureters were reinspected and both noted to be vermiculating equally and briskly. Laparotomy sponges were removed from the abdomen.  Bookwalter retractor was taken down.  Exparel was placed superiorly and inferiorly to the fascia.  The fascia was reapproximated with a #1 looped PDS in a running fashion.  Subcutaneous tissue was irrigated, cleared of all clot and debris, and rendered hemostatic. Subcutaneous adipose tissue was reapproximated with 2-0 vicryl suture in a running fashion. Skin incision was closed with 4-0 Monocryl in a subcuticular fashion and topped with sterile dressing. The patient tolerated the procedure well. Sponge, lap, needle, and instrument counts were correct x2 as reported by the circulating nurse. She was awakened from anesthesia and taken to recovery in stable  condition.

## 2018-11-20 NOTE — PHYSICIAN QUERY
PT Name: Skylar Valdez  MR #: [id number]          Physician Query Form - LEIOMYOMA/UTERINE FIBROIDS  CLARIFICATION     CDS/: Ivanna Jha               Contact information: [email or web address]     Query Date: November 20, 2018    By submitting this query, we are merely seeking further clarification of documentation. Please utilize your independent clinical judgment when addressing the question(s) below.    The medical record contains the following:   Indicators  Supporting Clinical Findings Location in Medical Record    Uterine Fibroid(s)      X Leiomyoma or Leiomyomata FINDINGS: 15 week size uterus with multiple leimomyoma Op Note 11/9    Fibromyoma or Fibromyomata      Radiology Findings      X Treatment PROCEDURE PERFORMED:  Robotic converted to total abdominal hysterectomy,   bilateral salpingo-oophorectomy, bilateral pelvic lymph node dissection, omentectomy.  Op Note 11/9    X Pathology Findings Gross Description:The  uterus is enlarged and has a nodular serosal surface. It is 9 cm across the fundus, 15 cm from fundus to cervix,  and a 10.5 cm in thickness. Protruding anteriorly is a subserosal nodule 5 cm in diameter. On the fundus are  several smaller nodules ranging from 0.5-1.0 cm. Posteriorly protrudes 3 nodules ranging from 1-3 cm. Path report 11/16    Other       Provider, please further specify the Leiomyoma/Uterine Fibroids diagnosis.    [ X ] Subserosal  [  ] Unspecified  [  ] Other Condition (please specify): _______________________________  [  ] Clinically Undetermined    Please document in your progress notes daily for the duration of treatment until resolved and include in your discharge summary.

## 2018-11-27 ENCOUNTER — OFFICE VISIT (OUTPATIENT)
Dept: INTERNAL MEDICINE | Facility: CLINIC | Age: 68
End: 2018-11-27
Attending: FAMILY MEDICINE
Payer: COMMERCIAL

## 2018-11-27 VITALS
OXYGEN SATURATION: 98 % | HEIGHT: 67 IN | BODY MASS INDEX: 31.94 KG/M2 | HEART RATE: 90 BPM | SYSTOLIC BLOOD PRESSURE: 135 MMHG | DIASTOLIC BLOOD PRESSURE: 78 MMHG | WEIGHT: 203.5 LBS

## 2018-11-27 DIAGNOSIS — I10 HYPERTENSION, ESSENTIAL: ICD-10-CM

## 2018-11-27 DIAGNOSIS — E03.4 HYPOTHYROIDISM DUE TO ACQUIRED ATROPHY OF THYROID: ICD-10-CM

## 2018-11-27 DIAGNOSIS — Z00.00 PREVENTATIVE HEALTH CARE: Primary | ICD-10-CM

## 2018-11-27 DIAGNOSIS — I10 ESSENTIAL HYPERTENSION: ICD-10-CM

## 2018-11-27 DIAGNOSIS — E11.9 TYPE 2 DIABETES MELLITUS WITHOUT COMPLICATION, WITHOUT LONG-TERM CURRENT USE OF INSULIN: ICD-10-CM

## 2018-11-27 DIAGNOSIS — E11.9 TYPE 2 DIABETES MELLITUS TREATED WITHOUT INSULIN: ICD-10-CM

## 2018-11-27 PROCEDURE — 99999 PR PBB SHADOW E&M-EST. PATIENT-LVL III: CPT | Mod: PBBFAC,,, | Performed by: FAMILY MEDICINE

## 2018-11-27 PROCEDURE — 3044F HG A1C LEVEL LT 7.0%: CPT | Mod: CPTII,S$GLB,, | Performed by: FAMILY MEDICINE

## 2018-11-27 PROCEDURE — 3075F SYST BP GE 130 - 139MM HG: CPT | Mod: CPTII,S$GLB,, | Performed by: FAMILY MEDICINE

## 2018-11-27 PROCEDURE — 3078F DIAST BP <80 MM HG: CPT | Mod: CPTII,S$GLB,, | Performed by: FAMILY MEDICINE

## 2018-11-27 PROCEDURE — 99397 PER PM REEVAL EST PAT 65+ YR: CPT | Mod: S$GLB,,, | Performed by: FAMILY MEDICINE

## 2018-11-27 RX ORDER — LOSARTAN POTASSIUM 100 MG/1
100 TABLET ORAL DAILY
Qty: 90 TABLET | Refills: 3 | Status: SHIPPED | OUTPATIENT
Start: 2018-11-27 | End: 2019-11-17 | Stop reason: SDUPTHER

## 2018-11-27 RX ORDER — ATORVASTATIN CALCIUM 20 MG/1
20 TABLET, FILM COATED ORAL DAILY
Qty: 90 TABLET | Refills: 3 | Status: SHIPPED | OUTPATIENT
Start: 2018-11-27 | End: 2019-08-19 | Stop reason: SDUPTHER

## 2018-11-27 RX ORDER — AMLODIPINE BESYLATE 10 MG/1
10 TABLET ORAL DAILY
Qty: 90 TABLET | Refills: 3 | Status: SHIPPED | OUTPATIENT
Start: 2018-11-27 | End: 2019-08-19 | Stop reason: SDUPTHER

## 2018-11-27 RX ORDER — METFORMIN HYDROCHLORIDE 500 MG/1
500 TABLET ORAL 2 TIMES DAILY WITH MEALS
Qty: 180 TABLET | Refills: 3 | Status: SHIPPED | OUTPATIENT
Start: 2018-11-27 | End: 2018-12-27 | Stop reason: SDUPTHER

## 2018-11-27 NOTE — PROGRESS NOTES
CHIEF COMPLAINT: Establish a primary care physician    HISTORY OF PRESENT ILLNESS: The patient is a very pleasant 68-year-old black female.  The patient has no specific complaints today.  She has become this contented with her current primary care physician.  The patient wishes to establish a new primary care physician.      She recently underwent a hysterectomy for endometrial cancer.  She has follow up with gynecologic oncology soon.  At that time they will discuss if she needs any further treatment.    The patient has diabetes.  Recent blood sugars have been less than 150.  There have been no episodes of hypoglycemia.  Patient does not routinely monitor their blood sugar.  Recent A1c was very good.  She is not on a statin currently.    The patient has a history of stable hypertension on current medications.  Patient denies chest pain or shortness of breath today.    REVIEW OF SYSTEMS:  GENERAL: No fever, chills, fatigability or weight loss.  SKIN: No rashes, itching or changes in color or texture of skin.  HEAD: No headaches or recent head trauma.  EYES: Visual acuity fine. No photophobia, ocular pain or diplopia.  EARS: Denies ear pain, discharge or vertigo.  NOSE: No loss of smell, no epistaxis or postnasal drip.  MOUTH & THROAT: No hoarseness or change in voice. No excessive gum bleeding.  NODES: Denies swollen glands.  CHEST: Denies SANTACRUZ, cyanosis, wheezing, cough and sputum production.  CARDIOVASCULAR: Denies chest pain, PND, orthopnea or reduced exercise tolerance.  ABDOMEN: Appetite fine. No weight loss. Denies diarrhea, abdominal pain, hematemesis or blood in stool.  URINARY: No flank pain, dysuria or hematuria.  PERIPHERAL VASCULAR: No claudication or cyanosis.  MUSCULOSKELETAL: No joint stiffness or swelling. Denies back pain.  NEUROLOGIC: No history of seizures, paralysis, alteration of gait or coordination.    SOCIAL HISTORY: The patient does not smoke.  The patient consumes alcohol socially.  The  "patient is the mother of another patient of mine.    PHYSICAL EXAMINATION:   Blood pressure 135/78, pulse 90, height 5' 7" (1.702 m), weight 92.3 kg (203 lb 7.8 oz), last menstrual period 06/01/2010, SpO2 98 %.    APPEARANCE: Well nourished, well developed, in no acute distress.    HEAD: Normocephalic, atraumatic.  EYES: PERRL. EOMI.  Conjunctivae without injection and  anicteric  EARS: TM's intact. Light reflex normal. No retraction or perforation.    NOSE: Mucosa pink. Airway clear.  MOUTH & THROAT: No tonsillar enlargement. No pharyngeal erythema or exudate. No stridor.  NECK: Supple.   NODES: No cervical, axillary or inguinal lymph node enlargement.  CHEST: Lungs clear to auscultation.  No retractions are noted.  No rales or rhonchi are present.  CARDIOVASCULAR: Normal S1, S2. No rubs, murmurs or gallops.  ABDOMEN: Bowel sounds normal. Not distended. Soft. No tenderness or masses.  No ascites is noted.  MUSCULOSKELETAL:  There is no clubbing, cyanosis, or edema of the extremities x4.  There is full range of motion of the lumbar spine.  There is full range of motion of the extremities x4.  There is no deformity noted.    NEUROLOGIC:       Normal speech development.      Hearing normal.      Normal gait.      Motor and sensory exams grossly normal.      DTR's normal.  PSYCHIATRIC: Patient is alert and oriented x3.  Thought processes are all normal.  There is no homicidality.  There is no suicidality.  There is no evidence of psychosis.    LABORATORY/RADIOLOGY:   Chart reviewed.  We will update blood work in 3 months.    ASSESSMENT:   Annual exam  Type 2 diabetes, well controlled  Hypertension, well controlled  Status post hysterectomy for endometrial cancer    PLAN:  Start a statin today  BW in 3 months with phone review    Return to clinic in one year.      Answers for HPI/ROS submitted by the patient on 11/26/2018   Hypertension  Chronicity: recurrent  Onset: more than 1 year ago  Progression since onset: " unchanged  Condition status: resistant  anxiety: No  blurred vision: No  chest pain: No  headaches: No  malaise/fatigue: No  neck pain: No  orthopnea: No  palpitations: No  peripheral edema: No  PND: No  shortness of breath: No  sweats: No  Agents associated with hypertension: no associated agents, thyroid hormones  CAD risks: diabetes mellitus  Compliance problems: no compliance problems  Past treatments: diuretics  Improvement on treatment: moderate

## 2018-12-04 ENCOUNTER — OFFICE VISIT (OUTPATIENT)
Dept: GYNECOLOGIC ONCOLOGY | Facility: CLINIC | Age: 68
End: 2018-12-04
Payer: COMMERCIAL

## 2018-12-04 VITALS
DIASTOLIC BLOOD PRESSURE: 67 MMHG | SYSTOLIC BLOOD PRESSURE: 143 MMHG | HEIGHT: 67 IN | WEIGHT: 203.25 LBS | HEART RATE: 86 BPM | BODY MASS INDEX: 31.9 KG/M2

## 2018-12-04 DIAGNOSIS — Z90.79 S/P TAH-BSO: ICD-10-CM

## 2018-12-04 DIAGNOSIS — Z90.710 S/P TAH-BSO: ICD-10-CM

## 2018-12-04 DIAGNOSIS — C54.1 LOW GRADE ENDOMETRIAL STROMAL SARCOMA OF UTERUS: Primary | ICD-10-CM

## 2018-12-04 DIAGNOSIS — Z90.722 S/P TAH-BSO: ICD-10-CM

## 2018-12-04 PROCEDURE — 99999 PR PBB SHADOW E&M-EST. PATIENT-LVL III: CPT | Mod: PBBFAC,,, | Performed by: OBSTETRICS & GYNECOLOGY

## 2018-12-04 PROCEDURE — 99024 POSTOP FOLLOW-UP VISIT: CPT | Mod: S$GLB,,, | Performed by: OBSTETRICS & GYNECOLOGY

## 2018-12-04 NOTE — PROGRESS NOTES
Subjective:      Patient ID: Skylar Valdez is a 68 y.o. female.    Chief Complaint: Post-op Evaluation (2 wk/ROSE MARIE/BSO/Bilateral pelvic lymph node dissection/Omentectomy )      HPI  Referred by Dr. Henley for newly diagnosed low grade endometrial stromal sarcoma.     Postmenopausal bleeding. Underwent D&C.   18  FINAL PATHOLOGIC DIAGNOSIS  1., 2. FRAGMENTS OF ENDOMETRIAL TISSUE SHOWING A CELLULAR NEOPLASM, MOST CONSISTENT  WITH A LOW-GRADE ENDOMETRIAL STROMAL SARCOMA.    Pelvic US:  The uterus  measures  11.0 x 7.8 x 7.6 cm .    No prior abdominal surgeries.  x 2. Family history significant for daughter with breast cancer. No FH ovarian, uterine, or colon cancer.     CT C/A/P  Negative for metastatic disease    She is now s/p RA converted to ROSE MARIE BSO LND and omentectomy on 2018. Intraoperative findings include 15 week size uterus with multiple leimomyoma. Normal fallopian tubes and ovaries bilaterally. 1-2mm omental implant concerning for disease. Otherwise no gross visible intraperitoneal disease    Final pathology:  LOW-GRADE ENDOMETRIAL STROMAL SARCOMA WITH DEEP MYOMETRIAL INVASION (95%), Tumor size: 11 x 6 x 5 cm  ADJACENT ENDOMETRIUM COMPRESSED AND ATROPHIC;  SECTIONS OF CERVIX, PARACERVICAL, AND PARAMETRIAL MARGINS FREE OF MALIGNANCY;  LEFT FALLOPIAN TUBE: NO EVIDENCE OF MALIGNANCY;  LEFT OVARY: SMALL FOCUS OF SURFACE HYPERPLASIA;  RIGHT FALLOPIAN TUBE: NO EVIDENCE OF MALIGNANCY;  RIGHT OVARY: NO SIGNIFICANT HISTOLOGICAL ABNORMALITY  Lymph nodes and omentum negative for malignancy.    Stage 1B     Presents today for postoperative visit. Without complaints. Recovering well from surgery. Up and about, eating, +BM.     Review of Systems   Constitutional: Negative for appetite change, chills, fatigue and fever.   HENT: Negative for mouth sores.    Respiratory: Negative for cough and shortness of breath.    Cardiovascular: Negative for leg swelling.   Gastrointestinal: Negative for abdominal  pain, blood in stool, constipation and diarrhea.   Endocrine: Negative for cold intolerance.   Genitourinary: Negative for dysuria and vaginal bleeding.   Musculoskeletal: Negative for myalgias.   Skin: Negative for rash.   Allergic/Immunologic: Negative.    Neurological: Negative for weakness and numbness.   Hematological: Negative for adenopathy. Does not bruise/bleed easily.   Psychiatric/Behavioral: Negative for confusion.       Past Medical History:   Diagnosis Date    Diabetes mellitus, type 2     Hyperlipidemia     Hypertension     Thyroid disease      Past Surgical History:   Procedure Laterality Date    HYSTERECTOMY, TOTAL, ABDOMINAL, WITH BILATERAL SALPINGO-OOPHORECTOMY  11/9/2018    Performed by [facility] at [facility] OR    HYSTEROSCOPY WITH DILATION AND CURETTAGE OF UTERUS N/A 9/18/2018    Procedure: HYSTEROSCOPY, WITH DILATION AND CURETTAGE OF UTERUS;  Surgeon: Lacie Henley MD;  Location: [facility];  Service: OB/GYN;  Laterality: N/A;    HYSTEROSCOPY, WITH DILATION AND CURETTAGE OF UTERUS N/A 9/18/2018    Performed by [facility] at [facility] OR    LYMPHADENECTOMY, PELVIS Bilateral 11/9/2018    [facility] at [facility] OR    OMENTECTOMY  11/9/2018    Procedure: OMENTECTOMY;  Surgeon: Grace Stanley MD;  Location: [facility] OR;  Service: OB/GYN;;    OMENTECTOMY  11/9/2018    [facility] at [facility] OR    ROBOT-ASSISTED LAPAROSCOPIC ABDOMINAL HYSTERECTOMY USING DA DONNA XI N/A 11/9/2018    Procedure: XI ROBOTIC HYSTERECTOMY CONVERTED TO OPEN 1336;  Surgeon: Grace Stanley MD;  Location: [facility] OR;  Service: OB/GYN;  Laterality: N/A;    TONSILLECTOMY      TOTAL ABDOMINAL HYSTERECTOMY W/ BILATERAL SALPINGOOPHORECTOMY  11/9/2018    Procedure: HYSTERECTOMY, TOTAL, ABDOMINAL, WITH BILATERAL SALPINGO-OOPHORECTOMY;  Surgeon: Grace Stanley MD;  Location: [facility] OR;  Service: OB/GYN;;    XI ROBOTIC HYSTERECTOMY CONVERTED TO OPEN 1336 N/A 11/9/2018     Per[facility] by [facility] at [facility] OR     Family History   Problem Relation Age of Onset    Hypertension Mother     Hyperlipidemia Mother     Stroke Father     Lung disease Father     Cancer Sister     Breast cancer Daughter     Ovarian cancer Neg Hx     Colon cancer Neg Hx      Social History     Socioeconomic History    Marital status:      Spouse name: Not on file    Number of children: Not on file    Years of education: Not on file    Highest education level: Not on file   Social Needs    Financial resource strain: Not on file    Food insecurity - worry: Not on file    Food insecurity - inability: Not on file    Transportation needs - medical: Not on file    Transportation needs - non-medical: Not on file   Occupational History    Not on file   Tobacco Use    Smoking status: Never Smoker    Smokeless tobacco: Never Used   Substance and Sexual Activity    Alcohol use: No    Drug use: No    Sexual activity: Not Currently     Birth control/protection: None   Other Topics Concern    Not on file   Social History Narrative    Not on file     Current Outpatient Medications   Medication Sig    amLODIPine (NORVASC) 10 MG tablet Take 1 tablet (10 mg total) by mouth once daily.    atorvastatin (LIPITOR) 20 MG tablet Take 1 tablet (20 mg total) by mouth once daily.    ibuprofen (ADVIL,MOTRIN) 600 MG tablet Take 1 tablet (600 mg total) by mouth 3 (three) times daily.    losartan (COZAAR) 100 MG tablet Take 1 tablet (100 mg total) by mouth once daily.    metFORMIN (GLUCOPHAGE) 500 MG tablet Take 1 tablet (500 mg total) by mouth 2 (two) times daily with meals.    SYNTHROID 125 mcg tablet Take by mouth before breakfast.      No current facility-administered medications for this visit.      Review of patient's allergies indicates:  No Known Allergies    Objective:   Physical Exam:   Constitutional: She is oriented to person, place, and time. She appears well-developed and  well-nourished. No distress.    HENT:   Head: Normocephalic and atraumatic.     Neck: Normal range of motion.    Cardiovascular: Regular rhythm.  Exam reveals no cyanosis and no edema.     Pulmonary/Chest: Effort normal. No respiratory distress.        Abdominal: Soft. She exhibits abdominal incision (5 robotic trocar sites C/D/I). She exhibits no distension and no ascites. There is no tenderness.             Musculoskeletal: Normal range of motion and moves all extremeties. She exhibits no edema.      Lymphadenopathy:        Right: No supraclavicular adenopathy present.        Left: No supraclavicular adenopathy present.    Neurological: She is alert and oriented to person, place, and time.    Skin: Skin is warm and dry. No rash noted. No cyanosis. No pallor.    Psychiatric: She has a normal mood and affect.       Assessment:     1. 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 S/P ROSE MARIE-BSO/omentectomy/staging LND 11/10/2018        Plan:     Recovering appropriately from surgery.   Reviewed pathology, Stage IB low grade endometrial stromal sarcoma.  Plan for adjuvant hormonal therapy with letrozole.  RTC 4 weeks for follow up post operative visit.

## 2018-12-04 NOTE — LETTER
December 15, 2018        Lacie Henley MD  [address]  [address]  [place] [address]             [facility]  [address], [address]  [place] [address]  Phone: [phone]  Fax: [phone]   Patient: Skylar Valdez   MR Number: [id number]   YOB: 1950   Date of Visit: 12/4/2018       Dear Dr. Henley:    Thank you for referring Skylar Valdez to me for evaluation. Below are the relevant portions of my assessment and plan of care.            If you have questions, please do not hesitate to call me. I look forward to following Skylar along with you.    Sincerely,      Grace Stanley MD           CC  No Recipients

## 2018-12-07 ENCOUNTER — PATIENT MESSAGE (OUTPATIENT)
Dept: GYNECOLOGIC ONCOLOGY | Facility: CLINIC | Age: 68
End: 2018-12-07

## 2018-12-07 ENCOUNTER — PATIENT MESSAGE (OUTPATIENT)
Dept: INTERNAL MEDICINE | Facility: CLINIC | Age: 68
End: 2018-12-07

## 2018-12-12 ENCOUNTER — PATIENT MESSAGE (OUTPATIENT)
Dept: INTERNAL MEDICINE | Facility: CLINIC | Age: 68
End: 2018-12-12

## 2018-12-12 DIAGNOSIS — E03.2 HYPOTHYROIDISM DUE TO NON-MEDICATION EXOGENOUS SUBSTANCES: Primary | ICD-10-CM

## 2018-12-13 RX ORDER — LEVOTHYROXINE SODIUM 125 UG/1
125 TABLET ORAL
Qty: 90 TABLET | Refills: 1 | Status: SHIPPED | OUTPATIENT
Start: 2018-12-13 | End: 2019-06-06 | Stop reason: SDUPTHER

## 2018-12-17 ENCOUNTER — PATIENT MESSAGE (OUTPATIENT)
Dept: OBSTETRICS AND GYNECOLOGY | Facility: CLINIC | Age: 68
End: 2018-12-17

## 2018-12-27 DIAGNOSIS — E11.9 TYPE 2 DIABETES MELLITUS TREATED WITHOUT INSULIN: ICD-10-CM

## 2018-12-27 RX ORDER — METFORMIN HYDROCHLORIDE 500 MG/1
500 TABLET ORAL 2 TIMES DAILY WITH MEALS
Qty: 180 TABLET | Refills: 0 | Status: SHIPPED | OUTPATIENT
Start: 2018-12-27 | End: 2019-03-22 | Stop reason: SDUPTHER

## 2018-12-28 DIAGNOSIS — Z12.11 COLON CANCER SCREENING: ICD-10-CM

## 2019-01-04 ENCOUNTER — PATIENT MESSAGE (OUTPATIENT)
Dept: INTERNAL MEDICINE | Facility: CLINIC | Age: 69
End: 2019-01-04

## 2019-01-07 ENCOUNTER — TELEPHONE (OUTPATIENT)
Dept: GYNECOLOGIC ONCOLOGY | Facility: CLINIC | Age: 69
End: 2019-01-07

## 2019-01-08 ENCOUNTER — OFFICE VISIT (OUTPATIENT)
Dept: GYNECOLOGIC ONCOLOGY | Facility: CLINIC | Age: 69
End: 2019-01-08
Payer: COMMERCIAL

## 2019-01-08 VITALS
SYSTOLIC BLOOD PRESSURE: 158 MMHG | BODY MASS INDEX: 32.21 KG/M2 | DIASTOLIC BLOOD PRESSURE: 71 MMHG | WEIGHT: 205.25 LBS | HEIGHT: 67 IN | HEART RATE: 77 BPM

## 2019-01-08 DIAGNOSIS — C54.1 LOW GRADE ENDOMETRIAL STROMAL SARCOMA OF UTERUS: ICD-10-CM

## 2019-01-08 DIAGNOSIS — Z91.89 AT RISK FOR DECREASED BONE DENSITY: Primary | ICD-10-CM

## 2019-01-08 DIAGNOSIS — Z90.710 S/P TAH-BSO: ICD-10-CM

## 2019-01-08 DIAGNOSIS — Z90.79 S/P TAH-BSO: ICD-10-CM

## 2019-01-08 DIAGNOSIS — C54.1 ENDOMETRIAL STROMAL SARCOMA: ICD-10-CM

## 2019-01-08 DIAGNOSIS — N89.8 VAGINAL LESION: ICD-10-CM

## 2019-01-08 DIAGNOSIS — Z90.722 S/P TAH-BSO: ICD-10-CM

## 2019-01-08 PROCEDURE — 99024 PR POST-OP FOLLOW-UP VISIT: ICD-10-PCS | Mod: S$GLB,,, | Performed by: OBSTETRICS & GYNECOLOGY

## 2019-01-08 PROCEDURE — 99024 POSTOP FOLLOW-UP VISIT: CPT | Mod: S$GLB,,, | Performed by: OBSTETRICS & GYNECOLOGY

## 2019-01-08 PROCEDURE — 99999 PR PBB SHADOW E&M-EST. PATIENT-LVL III: ICD-10-PCS | Mod: PBBFAC,,, | Performed by: OBSTETRICS & GYNECOLOGY

## 2019-01-08 PROCEDURE — 88305 TISSUE EXAM BY PATHOLOGIST: CPT | Performed by: PATHOLOGY

## 2019-01-08 PROCEDURE — 88305 TISSUE EXAM BY PATHOLOGIST: CPT | Mod: 26,,, | Performed by: PATHOLOGY

## 2019-01-08 PROCEDURE — 99999 PR PBB SHADOW E&M-EST. PATIENT-LVL III: CPT | Mod: PBBFAC,,, | Performed by: OBSTETRICS & GYNECOLOGY

## 2019-01-08 PROCEDURE — 88305 TISSUE SPECIMEN TO PATHOLOGY, OBSTETRICS/GYNECOLOGY: ICD-10-PCS | Mod: 26,,, | Performed by: PATHOLOGY

## 2019-01-08 RX ORDER — LETROZOLE 2.5 MG/1
2.5 TABLET, FILM COATED ORAL DAILY
Qty: 30 TABLET | Refills: 2 | Status: SHIPPED | OUTPATIENT
Start: 2019-01-08 | End: 2019-04-05 | Stop reason: SDUPTHER

## 2019-01-08 NOTE — PROGRESS NOTES
Subjective:      Patient ID: Skylar Valdez is a 68 y.o. female.    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      Presents today for follow up postoperative visit. Without complaints. Continues to recover well from surgery.    History:  Referred by Dr. Henley for newly diagnosed low grade endometrial stromal sarcoma.      Postmenopausal bleeding. Underwent D&C.   18  FINAL PATHOLOGIC DIAGNOSIS  1., 2. FRAGMENTS OF ENDOMETRIAL TISSUE SHOWING A CELLULAR NEOPLASM, MOST CONSISTENT  WITH A LOW-GRADE ENDOMETRIAL STROMAL SARCOMA.     Pelvic US:  The uterus  measures  11.0 x 7.8 x 7.6 cm .     No prior abdominal surgeries.  x 2. Family history significant for daughter with breast cancer. No FH ovarian, uterine, or colon cancer.      CT C/A/P  Negative for metastatic disease     She is now s/p RA converted to ROSE MARIE BSO LND and omentectomy on 2018. Intraoperative findings include 15 week size uterus with multiple leimomyoma. Normal fallopian tubes and ovaries bilaterally. 1-2mm omental implant concerning for disease. Otherwise no gross visible intraperitoneal disease     Final pathology:  LOW-GRADE ENDOMETRIAL STROMAL SARCOMA WITH DEEP MYOMETRIAL INVASION (95%), Tumor size: 11 x 6 x 5 cm  ADJACENT ENDOMETRIUM COMPRESSED AND ATROPHIC;  SECTIONS OF CERVIX, PARACERVICAL, AND PARAMETRIAL MARGINS FREE OF MALIGNANCY;  LEFT FALLOPIAN TUBE: NO EVIDENCE OF MALIGNANCY;  LEFT OVARY: SMALL FOCUS OF SURFACE HYPERPLASIA;  RIGHT FALLOPIAN TUBE: NO EVIDENCE OF MALIGNANCY;  RIGHT OVARY: NO SIGNIFICANT HISTOLOGICAL ABNORMALITY  Lymph nodes and omentum negative for malignancy.    Review of Systems   Constitutional: Negative for appetite change, chills, fatigue and fever.   HENT: Negative for mouth sores.    Respiratory: Negative for cough and shortness of breath.    Cardiovascular: Negative for leg swelling.   Gastrointestinal: Negative for abdominal pain, blood in stool,  constipation and diarrhea.   Endocrine: Negative for cold intolerance.   Genitourinary: Negative for dysuria and vaginal bleeding.   Musculoskeletal: Negative for myalgias.   Skin: Negative for rash.   Allergic/Immunologic: Negative.    Neurological: Negative for weakness and numbness.   Hematological: Negative for adenopathy. Does not bruise/bleed easily.   Psychiatric/Behavioral: Negative for confusion.       Objective:   Physical Exam:   Constitutional: She is oriented to person, place, and time. She appears well-developed and well-nourished.    HENT:   Head: Normocephalic and atraumatic.    Eyes: EOM are normal. Pupils are equal, round, and reactive to light.    Neck: Normal range of motion. Neck supple. No thyromegaly present.    Cardiovascular: Normal rate, regular rhythm and intact distal pulses.     Pulmonary/Chest: Effort normal and breath sounds normal. No respiratory distress. She has no wheezes.        Abdominal: Soft. Bowel sounds are normal. She exhibits no distension, no ascites and no mass. There is no tenderness.     Genitourinary: Rectum normal. Pelvic exam was performed with patient supine. There is no lesion on the right labia. There is no lesion on the left labia. Uterus is absent. There is an absent adnexa. Right adnexum displays no mass. Left adnexum displays no mass. Vaginal cuff normal.Cervix exhibits absence.   Genitourinary Comments: 0.5cm vaginal lesion, granulation tissue like in appearance.            Musculoskeletal: Normal range of motion and moves all extremeties.      Lymphadenopathy:     She has no cervical adenopathy.        Right: No inguinal and no supraclavicular adenopathy present.        Left: No inguinal and no supraclavicular adenopathy present.    Neurological: She is alert and oriented to person, place, and time.    Skin: Skin is warm and dry. No rash noted.    Psychiatric: She has a normal mood and affect.       Assessment:     1. At risk for decreased bone density    2.  Endometrial stromal sarcoma    3. Vaginal lesion    4. 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 S/P ORSE MARIE-BSO/omentectomy/staging LND 11/10/2018        Plan:     Orders Placed This Encounter   Procedures    DXA Bone Density Spine And Hip     Recovering appropriately from surgery.   Vaginal lesion on exam appears to be granulations tissue, removed and sent for pathology today.   Start adjuvant hormonal therapy with letrozole. Baseline hepatic function and DEXA.  RTC 3 months or sooner if needed.

## 2019-01-24 ENCOUNTER — HOSPITAL ENCOUNTER (OUTPATIENT)
Dept: RADIOLOGY | Facility: HOSPITAL | Age: 69
Discharge: HOME | End: 2019-01-24
Attending: OBSTETRICS & GYNECOLOGY
Payer: COMMERCIAL

## 2019-01-24 DIAGNOSIS — Z91.89 AT RISK FOR DECREASED BONE DENSITY: ICD-10-CM

## 2019-01-24 PROCEDURE — 77080 DXA BONE DENSITY AXIAL: CPT | Mod: TC

## 2019-01-24 PROCEDURE — 77080 DEXA BONE DENSITY SPINE HIP: ICD-10-PCS | Mod: 26,,, | Performed by: RADIOLOGY

## 2019-01-24 PROCEDURE — 77080 DXA BONE DENSITY AXIAL: CPT | Mod: 26,,, | Performed by: RADIOLOGY

## 2019-02-28 ENCOUNTER — LAB VISIT (OUTPATIENT)
Dept: LAB | Facility: OTHER | Age: 69
End: 2019-02-28
Attending: FAMILY MEDICINE
Payer: COMMERCIAL

## 2019-02-28 DIAGNOSIS — E11.9 TYPE 2 DIABETES MELLITUS WITHOUT COMPLICATION, WITHOUT LONG-TERM CURRENT USE OF INSULIN: ICD-10-CM

## 2019-02-28 DIAGNOSIS — I10 HYPERTENSION, ESSENTIAL: ICD-10-CM

## 2019-02-28 DIAGNOSIS — E03.4 HYPOTHYROIDISM DUE TO ACQUIRED ATROPHY OF THYROID: ICD-10-CM

## 2019-02-28 DIAGNOSIS — Z00.00 PREVENTATIVE HEALTH CARE: ICD-10-CM

## 2019-02-28 LAB
ALBUMIN SERPL BCP-MCNC: 4 G/DL
ALP SERPL-CCNC: 123 U/L
ALT SERPL W/O P-5'-P-CCNC: 23 U/L
ANION GAP SERPL CALC-SCNC: 10 MMOL/L
AST SERPL-CCNC: 20 U/L
BASOPHILS # BLD AUTO: 0.02 K/UL
BASOPHILS NFR BLD: 0.4 %
BILIRUB SERPL-MCNC: 0.7 MG/DL
BUN SERPL-MCNC: 10 MG/DL
CALCIUM SERPL-MCNC: 9.9 MG/DL
CHLORIDE SERPL-SCNC: 107 MMOL/L
CHOLEST SERPL-MCNC: 131 MG/DL
CHOLEST/HDLC SERPL: 2.4 {RATIO}
CO2 SERPL-SCNC: 24 MMOL/L
CREAT SERPL-MCNC: 0.7 MG/DL
DIFFERENTIAL METHOD: ABNORMAL
EOSINOPHIL # BLD AUTO: 0.1 K/UL
EOSINOPHIL NFR BLD: 2.9 %
ERYTHROCYTE [DISTWIDTH] IN BLOOD BY AUTOMATED COUNT: 12.9 %
EST. GFR  (AFRICAN AMERICAN): >60 ML/MIN/1.73 M^2
EST. GFR  (NON AFRICAN AMERICAN): >60 ML/MIN/1.73 M^2
ESTIMATED AVG GLUCOSE: 126 MG/DL
GLUCOSE SERPL-MCNC: 97 MG/DL
HBA1C MFR BLD HPLC: 6 %
HCT VFR BLD AUTO: 41.4 %
HDLC SERPL-MCNC: 55 MG/DL
HDLC SERPL: 42 %
HGB BLD-MCNC: 13.2 G/DL
LDLC SERPL CALC-MCNC: 64 MG/DL
LYMPHOCYTES # BLD AUTO: 2.3 K/UL
LYMPHOCYTES NFR BLD: 48.4 %
MCH RBC QN AUTO: 26.8 PG
MCHC RBC AUTO-ENTMCNC: 31.9 G/DL
MCV RBC AUTO: 84 FL
MONOCYTES # BLD AUTO: 0.4 K/UL
MONOCYTES NFR BLD: 8.1 %
NEUTROPHILS # BLD AUTO: 1.9 K/UL
NEUTROPHILS NFR BLD: 40 %
NONHDLC SERPL-MCNC: 76 MG/DL
PLATELET # BLD AUTO: 293 K/UL
PMV BLD AUTO: 11.1 FL
POTASSIUM SERPL-SCNC: 3.9 MMOL/L
PROT SERPL-MCNC: 7.9 G/DL
RBC # BLD AUTO: 4.93 M/UL
SODIUM SERPL-SCNC: 141 MMOL/L
T4 FREE SERPL-MCNC: 1.31 NG/DL
TRIGL SERPL-MCNC: 60 MG/DL
TSH SERPL DL<=0.005 MIU/L-ACNC: 0.36 UIU/ML
WBC # BLD AUTO: 4.79 K/UL

## 2019-02-28 PROCEDURE — 85025 COMPLETE CBC W/AUTO DIFF WBC: CPT

## 2019-02-28 PROCEDURE — 84443 ASSAY THYROID STIM HORMONE: CPT

## 2019-02-28 PROCEDURE — 80053 COMPREHEN METABOLIC PANEL: CPT

## 2019-02-28 PROCEDURE — 80061 LIPID PANEL: CPT

## 2019-02-28 PROCEDURE — 36415 COLL VENOUS BLD VENIPUNCTURE: CPT

## 2019-02-28 PROCEDURE — 83036 HEMOGLOBIN GLYCOSYLATED A1C: CPT

## 2019-02-28 PROCEDURE — 84439 ASSAY OF FREE THYROXINE: CPT

## 2019-03-01 DIAGNOSIS — E11.9 TYPE 2 DIABETES MELLITUS WITHOUT COMPLICATION: ICD-10-CM

## 2019-03-04 ENCOUNTER — TELEPHONE (OUTPATIENT)
Dept: GYNECOLOGIC ONCOLOGY | Facility: CLINIC | Age: 69
End: 2019-03-04

## 2019-03-07 ENCOUNTER — TELEPHONE (OUTPATIENT)
Dept: INTERNAL MEDICINE | Facility: CLINIC | Age: 69
End: 2019-03-07

## 2019-03-22 DIAGNOSIS — E11.9 TYPE 2 DIABETES MELLITUS TREATED WITHOUT INSULIN: ICD-10-CM

## 2019-03-22 RX ORDER — METFORMIN HYDROCHLORIDE 500 MG/1
TABLET ORAL
Qty: 180 TABLET | Refills: 0 | Status: SHIPPED | OUTPATIENT
Start: 2019-03-22 | End: 2019-06-16 | Stop reason: SDUPTHER

## 2019-04-05 DIAGNOSIS — C54.1 ENDOMETRIAL STROMAL SARCOMA: ICD-10-CM

## 2019-04-05 RX ORDER — LETROZOLE 2.5 MG/1
TABLET, FILM COATED ORAL
Qty: 30 TABLET | Refills: 1 | Status: SHIPPED | OUTPATIENT
Start: 2019-04-05 | End: 2019-04-08 | Stop reason: SDUPTHER

## 2019-04-08 DIAGNOSIS — C54.1 ENDOMETRIAL STROMAL SARCOMA: ICD-10-CM

## 2019-04-08 RX ORDER — LETROZOLE 2.5 MG/1
2.5 TABLET, FILM COATED ORAL DAILY
Qty: 30 TABLET | Refills: 1 | Status: SHIPPED | OUTPATIENT
Start: 2019-04-08 | End: 2019-04-09 | Stop reason: SDUPTHER

## 2019-04-09 DIAGNOSIS — C54.1 ENDOMETRIAL STROMAL SARCOMA: ICD-10-CM

## 2019-04-09 RX ORDER — LETROZOLE 2.5 MG/1
2.5 TABLET, FILM COATED ORAL DAILY
Qty: 30 TABLET | Refills: 1 | Status: SHIPPED | OUTPATIENT
Start: 2019-04-09 | End: 2019-07-16 | Stop reason: SDUPTHER

## 2019-04-15 ENCOUNTER — TELEPHONE (OUTPATIENT)
Dept: GYNECOLOGIC ONCOLOGY | Facility: CLINIC | Age: 69
End: 2019-04-15

## 2019-04-16 ENCOUNTER — OFFICE VISIT (OUTPATIENT)
Dept: GYNECOLOGIC ONCOLOGY | Facility: CLINIC | Age: 69
End: 2019-04-16
Payer: COMMERCIAL

## 2019-04-16 VITALS
DIASTOLIC BLOOD PRESSURE: 76 MMHG | SYSTOLIC BLOOD PRESSURE: 139 MMHG | HEIGHT: 67 IN | HEART RATE: 80 BPM | BODY MASS INDEX: 32.18 KG/M2 | WEIGHT: 205 LBS

## 2019-04-16 DIAGNOSIS — C54.1 LOW GRADE ENDOMETRIAL STROMAL SARCOMA OF UTERUS: Primary | ICD-10-CM

## 2019-04-16 PROCEDURE — 3288F FALL RISK ASSESSMENT DOCD: CPT | Mod: CPTII,S$GLB,, | Performed by: OBSTETRICS & GYNECOLOGY

## 2019-04-16 PROCEDURE — 3075F PR MOST RECENT SYSTOLIC BLOOD PRESS GE 130-139MM HG: ICD-10-PCS | Mod: CPTII,S$GLB,, | Performed by: OBSTETRICS & GYNECOLOGY

## 2019-04-16 PROCEDURE — 3288F PR FALLS RISK ASSESSMENT DOCUMENTED: ICD-10-PCS | Mod: CPTII,S$GLB,, | Performed by: OBSTETRICS & GYNECOLOGY

## 2019-04-16 PROCEDURE — 3078F PR MOST RECENT DIASTOLIC BLOOD PRESSURE < 80 MM HG: ICD-10-PCS | Mod: CPTII,S$GLB,, | Performed by: OBSTETRICS & GYNECOLOGY

## 2019-04-16 PROCEDURE — 99214 PR OFFICE/OUTPT VISIT, EST, LEVL IV, 30-39 MIN: ICD-10-PCS | Mod: S$GLB,,, | Performed by: OBSTETRICS & GYNECOLOGY

## 2019-04-16 PROCEDURE — 1100F PR PT FALLS ASSESS DOC 2+ FALLS/FALL W/INJURY/YR: ICD-10-PCS | Mod: CPTII,S$GLB,, | Performed by: OBSTETRICS & GYNECOLOGY

## 2019-04-16 PROCEDURE — 99999 PR PBB SHADOW E&M-EST. PATIENT-LVL III: CPT | Mod: PBBFAC,,, | Performed by: OBSTETRICS & GYNECOLOGY

## 2019-04-16 PROCEDURE — 3075F SYST BP GE 130 - 139MM HG: CPT | Mod: CPTII,S$GLB,, | Performed by: OBSTETRICS & GYNECOLOGY

## 2019-04-16 PROCEDURE — 3078F DIAST BP <80 MM HG: CPT | Mod: CPTII,S$GLB,, | Performed by: OBSTETRICS & GYNECOLOGY

## 2019-04-16 PROCEDURE — 1100F PTFALLS ASSESS-DOCD GE2>/YR: CPT | Mod: CPTII,S$GLB,, | Performed by: OBSTETRICS & GYNECOLOGY

## 2019-04-16 PROCEDURE — 99999 PR PBB SHADOW E&M-EST. PATIENT-LVL III: ICD-10-PCS | Mod: PBBFAC,,, | Performed by: OBSTETRICS & GYNECOLOGY

## 2019-04-16 PROCEDURE — 99214 OFFICE O/P EST MOD 30 MIN: CPT | Mod: S$GLB,,, | Performed by: OBSTETRICS & GYNECOLOGY

## 2019-04-16 NOTE — PROGRESS NOTES
Subjective:      Patient ID: Skylar Valdez is a 68 y.o. female.    Chief Complaint: Follow-up (3 month)      HPI  Stage 1B low grade endometrial stromal sarcoma 2018.   Adjuvant letrozole  DEXA 2019  Posterior vagina biopsy- tissue consistent with LG-ESS, lesion essentially removed with biopsy forceps.    Presents today for surveillance visit. Without complaints. Specifically denies N/V, pain, swelling, bleeding, unexpected weight change, SOB, problems with bowel or bladder function.         History:  Referred by Dr. Henley for newly diagnosed low grade endometrial stromal sarcoma.      Postmenopausal bleeding. Underwent D&C.   18  FINAL PATHOLOGIC DIAGNOSIS  1., 2. FRAGMENTS OF ENDOMETRIAL TISSUE SHOWING A CELLULAR NEOPLASM, MOST CONSISTENT  WITH A LOW-GRADE ENDOMETRIAL STROMAL SARCOMA.     Pelvic US:  The uterus  measures  11.0 x 7.8 x 7.6 cm .     No prior abdominal surgeries.  x 2. Family history significant for daughter with breast cancer. No FH ovarian, uterine, or colon cancer.      CT C/A/P  Negative for metastatic disease     She is now s/p RA converted to ROSE MARIE BSO LND and omentectomy on 2018. Intraoperative findings include 15 week size uterus with multiple leimomyoma. Normal fallopian tubes and ovaries bilaterally. 1-2mm omental implant concerning for disease. Otherwise no gross visible intraperitoneal disease     Final pathology:  LOW-GRADE ENDOMETRIAL STROMAL SARCOMA WITH DEEP MYOMETRIAL INVASION (95%), Tumor size: 11 x 6 x 5 cm  ADJACENT ENDOMETRIUM COMPRESSED AND ATROPHIC;  SECTIONS OF CERVIX, PARACERVICAL, AND PARAMETRIAL MARGINS FREE OF MALIGNANCY;  LEFT FALLOPIAN TUBE: NO EVIDENCE OF MALIGNANCY;  LEFT OVARY: SMALL FOCUS OF SURFACE HYPERPLASIA;  RIGHT FALLOPIAN TUBE: NO EVIDENCE OF MALIGNANCY;  RIGHT OVARY: NO SIGNIFICANT HISTOLOGICAL ABNORMALITY  Lymph nodes and omentum negative for malignancy.    Review of Systems   Constitutional: Negative for appetite change, chills,  fatigue and fever.   HENT: Negative for mouth sores.    Respiratory: Negative for cough and shortness of breath.    Cardiovascular: Negative for leg swelling.   Gastrointestinal: Negative for abdominal pain, blood in stool, constipation and diarrhea.   Endocrine: Negative for cold intolerance.   Genitourinary: Negative for dysuria and vaginal bleeding.   Musculoskeletal: Negative for myalgias.   Skin: Negative for rash.   Allergic/Immunologic: Negative.    Neurological: Negative for weakness and numbness.   Hematological: Negative for adenopathy. Does not bruise/bleed easily.   Psychiatric/Behavioral: Negative for confusion.       Objective:   Physical Exam:   Constitutional: She is oriented to person, place, and time. She appears well-developed and well-nourished.    HENT:   Head: Normocephalic and atraumatic.    Eyes: Pupils are equal, round, and reactive to light. EOM are normal.    Neck: Normal range of motion. Neck supple. No thyromegaly present.    Cardiovascular: Normal rate, regular rhythm and intact distal pulses.     Pulmonary/Chest: Effort normal and breath sounds normal. No respiratory distress. She has no wheezes.        Abdominal: Soft. Bowel sounds are normal. She exhibits no distension, no ascites and no mass. There is no tenderness.     Genitourinary: Rectum normal and vagina normal. Pelvic exam was performed with patient supine. There is no lesion on the right labia. There is no lesion on the left labia. Uterus is absent. There is an absent adnexa. Right adnexum displays no mass. Left adnexum displays no mass. Vaginal cuff normal.Cervix exhibits absence.           Musculoskeletal: Normal range of motion and moves all extremeties.      Lymphadenopathy:     She has no cervical adenopathy.        Right: No inguinal and no supraclavicular adenopathy present.        Left: No inguinal and no supraclavicular adenopathy present.    Neurological: She is alert and oriented to person, place, and time.     Skin: Skin is warm and dry. No rash noted.    Psychiatric: She has a normal mood and affect.       Assessment:     1. 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    No evidence of disease on today's exam  Feels well, no new symptoms  Disease free interval 5 months   Continue letrozole    Recommend continued surveillance. RTC 3 months or sooner if needed.

## 2019-06-06 DIAGNOSIS — E03.2 HYPOTHYROIDISM DUE TO NON-MEDICATION EXOGENOUS SUBSTANCES: ICD-10-CM

## 2019-06-06 RX ORDER — LEVOTHYROXINE SODIUM 125 UG/1
TABLET ORAL
Qty: 30 TABLET | Refills: 11 | Status: SHIPPED | OUTPATIENT
Start: 2019-06-06 | End: 2021-03-30

## 2019-06-16 DIAGNOSIS — E11.9 TYPE 2 DIABETES MELLITUS TREATED WITHOUT INSULIN: ICD-10-CM

## 2019-06-18 RX ORDER — METFORMIN HYDROCHLORIDE 500 MG/1
TABLET ORAL
Qty: 180 TABLET | Refills: 0 | Status: SHIPPED | OUTPATIENT
Start: 2019-06-18 | End: 2019-06-20 | Stop reason: SDUPTHER

## 2019-06-20 DIAGNOSIS — E11.9 TYPE 2 DIABETES MELLITUS TREATED WITHOUT INSULIN: ICD-10-CM

## 2019-06-20 RX ORDER — METFORMIN HYDROCHLORIDE 500 MG/1
TABLET ORAL
Qty: 180 TABLET | Refills: 3 | Status: SHIPPED | OUTPATIENT
Start: 2019-06-20 | End: 2020-06-16

## 2019-07-16 ENCOUNTER — OFFICE VISIT (OUTPATIENT)
Dept: GYNECOLOGIC ONCOLOGY | Facility: CLINIC | Age: 69
End: 2019-07-16
Payer: COMMERCIAL

## 2019-07-16 VITALS
HEIGHT: 67 IN | HEART RATE: 76 BPM | BODY MASS INDEX: 32.67 KG/M2 | DIASTOLIC BLOOD PRESSURE: 67 MMHG | WEIGHT: 208.13 LBS | SYSTOLIC BLOOD PRESSURE: 138 MMHG

## 2019-07-16 DIAGNOSIS — C54.1 ENDOMETRIAL STROMAL SARCOMA: ICD-10-CM

## 2019-07-16 DIAGNOSIS — C54.1 LOW GRADE ENDOMETRIAL STROMAL SARCOMA OF UTERUS: Primary | ICD-10-CM

## 2019-07-16 PROCEDURE — 1101F PT FALLS ASSESS-DOCD LE1/YR: CPT | Mod: CPTII,S$GLB,, | Performed by: OBSTETRICS & GYNECOLOGY

## 2019-07-16 PROCEDURE — 99999 PR PBB SHADOW E&M-EST. PATIENT-LVL III: ICD-10-PCS | Mod: PBBFAC,,, | Performed by: OBSTETRICS & GYNECOLOGY

## 2019-07-16 PROCEDURE — 99214 PR OFFICE/OUTPT VISIT, EST, LEVL IV, 30-39 MIN: ICD-10-PCS | Mod: S$GLB,,, | Performed by: OBSTETRICS & GYNECOLOGY

## 2019-07-16 PROCEDURE — 3078F DIAST BP <80 MM HG: CPT | Mod: CPTII,S$GLB,, | Performed by: OBSTETRICS & GYNECOLOGY

## 2019-07-16 PROCEDURE — 99214 OFFICE O/P EST MOD 30 MIN: CPT | Mod: S$GLB,,, | Performed by: OBSTETRICS & GYNECOLOGY

## 2019-07-16 PROCEDURE — 99999 PR PBB SHADOW E&M-EST. PATIENT-LVL III: CPT | Mod: PBBFAC,,, | Performed by: OBSTETRICS & GYNECOLOGY

## 2019-07-16 PROCEDURE — 3078F PR MOST RECENT DIASTOLIC BLOOD PRESSURE < 80 MM HG: ICD-10-PCS | Mod: CPTII,S$GLB,, | Performed by: OBSTETRICS & GYNECOLOGY

## 2019-07-16 PROCEDURE — 3075F PR MOST RECENT SYSTOLIC BLOOD PRESS GE 130-139MM HG: ICD-10-PCS | Mod: CPTII,S$GLB,, | Performed by: OBSTETRICS & GYNECOLOGY

## 2019-07-16 PROCEDURE — 3075F SYST BP GE 130 - 139MM HG: CPT | Mod: CPTII,S$GLB,, | Performed by: OBSTETRICS & GYNECOLOGY

## 2019-07-16 PROCEDURE — 1101F PR PT FALLS ASSESS DOC 0-1 FALLS W/OUT INJ PAST YR: ICD-10-PCS | Mod: CPTII,S$GLB,, | Performed by: OBSTETRICS & GYNECOLOGY

## 2019-07-16 RX ORDER — LETROZOLE 2.5 MG/1
2.5 TABLET, FILM COATED ORAL DAILY
Qty: 30 TABLET | Refills: 2 | Status: SHIPPED | OUTPATIENT
Start: 2019-07-16 | End: 2019-09-18 | Stop reason: SDUPTHER

## 2019-07-16 RX ORDER — IBUPROFEN 600 MG/1
600 TABLET ORAL 3 TIMES DAILY
Qty: 30 TABLET | Refills: 1 | Status: SHIPPED | OUTPATIENT
Start: 2019-07-16 | End: 2021-03-16

## 2019-07-16 NOTE — PROGRESS NOTES
Subjective:      Patient ID: Skylar Valdez is a 69 y.o. female.    Chief Complaint: low grade endometrial stromal sarcoma of uterus      HPI  Stage 1B low grade endometrial stromal sarcoma 2018.   Adjuvant letrozole  DEXA 2019  Posterior vagina biopsy- tissue consistent with LG-ESS, lesion essentially removed with biopsy forceps.     Presents today for surveillance visit. Without complaints. Specifically denies N/V, pain, swelling, bleeding, unexpected weight change, SOB, problems with bowel or bladder function.   Disease free interval 8 months.         History:  Referred by Dr. Henley for newly diagnosed low grade endometrial stromal sarcoma.      Postmenopausal bleeding. Underwent D&C.   18  FINAL PATHOLOGIC DIAGNOSIS  1., 2. FRAGMENTS OF ENDOMETRIAL TISSUE SHOWING A CELLULAR NEOPLASM, MOST CONSISTENT  WITH A LOW-GRADE ENDOMETRIAL STROMAL SARCOMA.     Pelvic US:  The uterus  measures  11.0 x 7.8 x 7.6 cm .     No prior abdominal surgeries.  x 2. Family history significant for daughter with breast cancer. No FH ovarian, uterine, or colon cancer.      CT C/A/P  Negative for metastatic disease     She is now s/p RA converted to ROSE MARIE BSO LND and omentectomy on 2018. Intraoperative findings include 15 week size uterus with multiple leimomyoma. Normal fallopian tubes and ovaries bilaterally. 1-2mm omental implant concerning for disease. Otherwise no gross visible intraperitoneal disease     Final pathology:  LOW-GRADE ENDOMETRIAL STROMAL SARCOMA WITH DEEP MYOMETRIAL INVASION (95%), Tumor size: 11 x 6 x 5 cm  ADJACENT ENDOMETRIUM COMPRESSED AND ATROPHIC;  SECTIONS OF CERVIX, PARACERVICAL, AND PARAMETRIAL MARGINS FREE OF MALIGNANCY;  LEFT FALLOPIAN TUBE: NO EVIDENCE OF MALIGNANCY;  LEFT OVARY: SMALL FOCUS OF SURFACE HYPERPLASIA;  RIGHT FALLOPIAN TUBE: NO EVIDENCE OF MALIGNANCY;  RIGHT OVARY: NO SIGNIFICANT HISTOLOGICAL ABNORMALITY  Lymph nodes and omentum negative for malignancy.  Review of  Systems   Constitutional: Negative for appetite change, chills, fatigue and fever.   HENT: Negative for mouth sores.    Respiratory: Negative for cough and shortness of breath.    Cardiovascular: Negative for leg swelling.   Gastrointestinal: Negative for abdominal pain, blood in stool, constipation and diarrhea.   Endocrine: Negative for cold intolerance.   Genitourinary: Negative for dysuria and vaginal bleeding.   Musculoskeletal: Negative for myalgias.   Skin: Negative for rash.   Allergic/Immunologic: Negative.    Neurological: Negative for weakness and numbness.   Hematological: Negative for adenopathy. Does not bruise/bleed easily.   Psychiatric/Behavioral: Negative for confusion.       Objective:   Physical Exam:   Constitutional: She is oriented to person, place, and time. She appears well-developed and well-nourished.    HENT:   Head: Normocephalic and atraumatic.    Eyes: Pupils are equal, round, and reactive to light. EOM are normal.    Neck: Normal range of motion. Neck supple. No thyromegaly present.    Cardiovascular: Normal rate, regular rhythm and intact distal pulses.     Pulmonary/Chest: Effort normal and breath sounds normal. No respiratory distress. She has no wheezes.        Abdominal: Soft. Bowel sounds are normal. She exhibits no distension, no ascites and no mass. There is no tenderness.     Genitourinary: Rectum normal and vagina normal. Pelvic exam was performed with patient supine. There is no lesion on the right labia. There is no lesion on the left labia. Uterus is absent. There is an absent adnexa. Right adnexum displays no mass. Left adnexum displays no mass. Vaginal cuff normal.Cervix exhibits absence.           Musculoskeletal: Normal range of motion and moves all extremeties.      Lymphadenopathy:     She has no cervical adenopathy.        Right: No inguinal and no supraclavicular adenopathy present.        Left: No inguinal and no supraclavicular adenopathy present.     Neurological: She is alert and oriented to person, place, and time.    Skin: Skin is warm and dry. No rash noted.    Psychiatric: She has a normal mood and affect.       Assessment:     1. 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 Endometrial stromal sarcoma        Plan:   No orders of the defined types were placed in this encounter.    No evidence of disease on today's exam  Feels well, no new symptoms  Disease free interval 8 months   Continue letrozole     Recommend continued surveillance. RTC 3 months or sooner if needed.

## 2019-08-19 DIAGNOSIS — E11.9 TYPE 2 DIABETES MELLITUS WITHOUT COMPLICATION, WITHOUT LONG-TERM CURRENT USE OF INSULIN: ICD-10-CM

## 2019-08-19 DIAGNOSIS — I10 ESSENTIAL HYPERTENSION: ICD-10-CM

## 2019-08-19 RX ORDER — ATORVASTATIN CALCIUM 20 MG/1
20 TABLET, FILM COATED ORAL DAILY
Qty: 90 TABLET | Refills: 2 | Status: SHIPPED | OUTPATIENT
Start: 2019-08-19 | End: 2021-03-30

## 2019-08-19 RX ORDER — AMLODIPINE BESYLATE 10 MG/1
10 TABLET ORAL DAILY
Qty: 90 TABLET | Refills: 2 | Status: SHIPPED | OUTPATIENT
Start: 2019-08-19

## 2019-09-06 DIAGNOSIS — E11.9 TYPE 2 DIABETES MELLITUS WITHOUT COMPLICATION: ICD-10-CM

## 2019-09-18 DIAGNOSIS — C54.1 ENDOMETRIAL STROMAL SARCOMA: ICD-10-CM

## 2019-09-18 RX ORDER — LETROZOLE 2.5 MG/1
TABLET, FILM COATED ORAL
Qty: 30 TABLET | Refills: 1 | Status: SHIPPED | OUTPATIENT
Start: 2019-09-18 | End: 2019-11-17 | Stop reason: SDUPTHER

## 2019-10-11 ENCOUNTER — TELEPHONE (OUTPATIENT)
Dept: GYNECOLOGIC ONCOLOGY | Facility: CLINIC | Age: 69
End: 2019-10-11

## 2019-10-21 ENCOUNTER — TELEPHONE (OUTPATIENT)
Dept: GYNECOLOGIC ONCOLOGY | Facility: CLINIC | Age: 69
End: 2019-10-21

## 2019-10-21 NOTE — TELEPHONE ENCOUNTER
----- Message from Bhakti Larsen RN sent at 10/21/2019  8:57 AM CDT -----  Contact: [facility] [[id number]]  Please contact pt to reschedule.    Thank you,  Bhakti  ----- Message -----  From: Teena Smith  Sent: 10/21/2019   8:48 AM CDT  To: [facility] Staff    Name of Who is Calling : [facility] AVILA [[id number]]    What is the request in detail :     Patient is requesting a call from staff in regards to rescheduling her appointment she cannot keep her appointment due to the time   .....Please contact to further discuss and advise.    Can the clinic reply by [facility] :  Phone call only    What Number to Call Back : [phone] or [phone]

## 2019-10-28 ENCOUNTER — OFFICE VISIT (OUTPATIENT)
Dept: GYNECOLOGIC ONCOLOGY | Facility: CLINIC | Age: 69
End: 2019-10-28
Payer: COMMERCIAL

## 2019-10-28 ENCOUNTER — TELEPHONE (OUTPATIENT)
Dept: ADMINISTRATIVE | Facility: OTHER | Age: 69
End: 2019-10-28

## 2019-10-28 VITALS
HEART RATE: 72 BPM | BODY MASS INDEX: 31.97 KG/M2 | DIASTOLIC BLOOD PRESSURE: 60 MMHG | HEIGHT: 67 IN | SYSTOLIC BLOOD PRESSURE: 123 MMHG | WEIGHT: 203.69 LBS

## 2019-10-28 DIAGNOSIS — C54.1 LOW GRADE ENDOMETRIAL STROMAL SARCOMA OF UTERUS: Primary | ICD-10-CM

## 2019-10-28 PROCEDURE — 99214 PR OFFICE/OUTPT VISIT, EST, LEVL IV, 30-39 MIN: ICD-10-PCS | Mod: S$GLB,,, | Performed by: OBSTETRICS & GYNECOLOGY

## 2019-10-28 PROCEDURE — 3074F SYST BP LT 130 MM HG: CPT | Mod: CPTII,S$GLB,, | Performed by: OBSTETRICS & GYNECOLOGY

## 2019-10-28 PROCEDURE — 3078F PR MOST RECENT DIASTOLIC BLOOD PRESSURE < 80 MM HG: ICD-10-PCS | Mod: CPTII,S$GLB,, | Performed by: OBSTETRICS & GYNECOLOGY

## 2019-10-28 PROCEDURE — 1101F PT FALLS ASSESS-DOCD LE1/YR: CPT | Mod: CPTII,S$GLB,, | Performed by: OBSTETRICS & GYNECOLOGY

## 2019-10-28 PROCEDURE — 99999 PR PBB SHADOW E&M-EST. PATIENT-LVL III: CPT | Mod: PBBFAC,,, | Performed by: OBSTETRICS & GYNECOLOGY

## 2019-10-28 PROCEDURE — 3074F PR MOST RECENT SYSTOLIC BLOOD PRESSURE < 130 MM HG: ICD-10-PCS | Mod: CPTII,S$GLB,, | Performed by: OBSTETRICS & GYNECOLOGY

## 2019-10-28 PROCEDURE — 3078F DIAST BP <80 MM HG: CPT | Mod: CPTII,S$GLB,, | Performed by: OBSTETRICS & GYNECOLOGY

## 2019-10-28 PROCEDURE — 99214 OFFICE O/P EST MOD 30 MIN: CPT | Mod: S$GLB,,, | Performed by: OBSTETRICS & GYNECOLOGY

## 2019-10-28 PROCEDURE — 1101F PR PT FALLS ASSESS DOC 0-1 FALLS W/OUT INJ PAST YR: ICD-10-PCS | Mod: CPTII,S$GLB,, | Performed by: OBSTETRICS & GYNECOLOGY

## 2019-10-28 PROCEDURE — 99999 PR PBB SHADOW E&M-EST. PATIENT-LVL III: ICD-10-PCS | Mod: PBBFAC,,, | Performed by: OBSTETRICS & GYNECOLOGY

## 2019-10-28 RX ORDER — LEVOTHYROXINE SODIUM 112 UG/1
112 TABLET ORAL
COMMUNITY
Start: 2019-10-15

## 2019-10-28 RX ORDER — CHLORTHALIDONE 25 MG/1
25 TABLET ORAL DAILY
COMMUNITY
Start: 2019-10-11

## 2019-11-03 NOTE — PROGRESS NOTES
Subjective:      Patient ID: Skylar Valdez is a 69 y.o. female.    Chief Complaint: low grade endometrial stromal sarcoma of uterus      HPI  Stage 1B low grade endometrial stromal sarcoma 2018.   Adjuvant letrozole  DEXA 2019  Posterior vagina biopsy- tissue consistent with LG-ESS, lesion essentially removed with biopsy forceps.     Presents today for surveillance visit. Without complaints. Specifically denies N/V, pain, swelling, bleeding, unexpected weight change, SOB, problems with bowel or bladder function.   Disease free interval 12 months.       History:  Referred by Dr. Henley for newly diagnosed low grade endometrial stromal sarcoma.      Postmenopausal bleeding. Underwent D&C.   18  FINAL PATHOLOGIC DIAGNOSIS  1., 2. FRAGMENTS OF ENDOMETRIAL TISSUE SHOWING A CELLULAR NEOPLASM, MOST CONSISTENT  WITH A LOW-GRADE ENDOMETRIAL STROMAL SARCOMA.     Pelvic US:  The uterus  measures  11.0 x 7.8 x 7.6 cm .     No prior abdominal surgeries.  x 2. Family history significant for daughter with breast cancer. No FH ovarian, uterine, or colon cancer.      CT C/A/P  Negative for metastatic disease     She is now s/p RA converted to ROSE MARIE BSO LND and omentectomy on 2018. Intraoperative findings include 15 week size uterus with multiple leimomyoma. Normal fallopian tubes and ovaries bilaterally. 1-2mm omental implant concerning for disease. Otherwise no gross visible intraperitoneal disease     Final pathology:  LOW-GRADE ENDOMETRIAL STROMAL SARCOMA WITH DEEP MYOMETRIAL INVASION (95%), Tumor size: 11 x 6 x 5 cm  ADJACENT ENDOMETRIUM COMPRESSED AND ATROPHIC;  SECTIONS OF CERVIX, PARACERVICAL, AND PARAMETRIAL MARGINS FREE OF MALIGNANCY;  LEFT FALLOPIAN TUBE: NO EVIDENCE OF MALIGNANCY;  LEFT OVARY: SMALL FOCUS OF SURFACE HYPERPLASIA;  RIGHT FALLOPIAN TUBE: NO EVIDENCE OF MALIGNANCY;  RIGHT OVARY: NO SIGNIFICANT HISTOLOGICAL ABNORMALITY  Lymph nodes and omentum negative for malignancy.  Review of  Systems   Constitutional: Negative for appetite change, chills, fatigue and fever.   HENT: Negative for mouth sores.    Respiratory: Negative for cough and shortness of breath.    Cardiovascular: Negative for leg swelling.   Gastrointestinal: Negative for abdominal pain, blood in stool, constipation and diarrhea.   Endocrine: Negative for cold intolerance.   Genitourinary: Negative for dysuria and vaginal bleeding.   Musculoskeletal: Negative for myalgias.   Skin: Negative for rash.   Allergic/Immunologic: Negative.    Neurological: Negative for weakness and numbness.   Hematological: Negative for adenopathy. Does not bruise/bleed easily.   Psychiatric/Behavioral: Negative for confusion.       Objective:   Physical Exam:   Constitutional: She is oriented to person, place, and time. She appears well-developed and well-nourished.    HENT:   Head: Normocephalic and atraumatic.    Eyes: Pupils are equal, round, and reactive to light. EOM are normal.    Neck: Normal range of motion. Neck supple. No thyromegaly present.    Cardiovascular: Normal rate, regular rhythm and intact distal pulses.     Pulmonary/Chest: Effort normal and breath sounds normal. No respiratory distress. She has no wheezes.        Abdominal: Soft. Bowel sounds are normal. She exhibits no distension, no ascites and no mass. There is no tenderness.     Genitourinary: Rectum normal and vagina normal. Pelvic exam was performed with patient supine. There is no lesion on the right labia. There is no lesion on the left labia. Uterus is absent. There is an absent adnexa. Right adnexum displays no mass. Left adnexum displays no mass. Vaginal cuff normal.Cervix exhibits absence.           Musculoskeletal: Normal range of motion and moves all extremeties.      Lymphadenopathy:     She has no cervical adenopathy.        Right: No inguinal and no supraclavicular adenopathy present.        Left: No inguinal and no supraclavicular adenopathy present.     Neurological: She is alert and oriented to person, place, and time.    Skin: Skin is warm and dry. No rash noted.    Psychiatric: She has a normal mood and affect.       Assessment:     1. 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    No evidence of disease on today's exam  Feels well, no new symptoms  Disease free interval 12 months   Continue letrozole     Recommend continued surveillance. RTC 3 months or sooner if needed.

## 2019-11-17 DIAGNOSIS — C54.1 ENDOMETRIAL STROMAL SARCOMA: ICD-10-CM

## 2019-11-17 RX ORDER — LETROZOLE 2.5 MG/1
TABLET, FILM COATED ORAL
Qty: 30 TABLET | Refills: 0 | Status: SHIPPED | OUTPATIENT
Start: 2019-11-17 | End: 2019-12-17 | Stop reason: SDUPTHER

## 2019-11-18 RX ORDER — LOSARTAN POTASSIUM 100 MG/1
100 TABLET ORAL DAILY
Qty: 90 TABLET | Refills: 2 | Status: SHIPPED | OUTPATIENT
Start: 2019-11-18 | End: 2021-03-30

## 2019-12-17 DIAGNOSIS — C54.1 ENDOMETRIAL STROMAL SARCOMA: ICD-10-CM

## 2019-12-17 RX ORDER — LETROZOLE 2.5 MG/1
TABLET, FILM COATED ORAL
Qty: 30 TABLET | Refills: 0 | Status: SHIPPED | OUTPATIENT
Start: 2019-12-17 | End: 2020-01-16

## 2020-01-03 DIAGNOSIS — Z12.11 COLON CANCER SCREENING: ICD-10-CM

## 2020-01-16 DIAGNOSIS — C54.1 ENDOMETRIAL STROMAL SARCOMA: ICD-10-CM

## 2020-01-16 RX ORDER — LETROZOLE 2.5 MG/1
TABLET, FILM COATED ORAL
Qty: 30 TABLET | Refills: 2 | Status: SHIPPED | OUTPATIENT
Start: 2020-01-16 | End: 2020-04-15

## 2020-01-28 ENCOUNTER — TELEPHONE (OUTPATIENT)
Dept: ADMINISTRATIVE | Facility: OTHER | Age: 70
End: 2020-01-28

## 2020-01-29 ENCOUNTER — OFFICE VISIT (OUTPATIENT)
Dept: GYNECOLOGIC ONCOLOGY | Facility: CLINIC | Age: 70
End: 2020-01-29
Payer: MEDICARE

## 2020-01-29 VITALS
WEIGHT: 200.19 LBS | HEART RATE: 83 BPM | HEIGHT: 67 IN | SYSTOLIC BLOOD PRESSURE: 137 MMHG | DIASTOLIC BLOOD PRESSURE: 61 MMHG | BODY MASS INDEX: 31.42 KG/M2

## 2020-01-29 DIAGNOSIS — Z12.31 SCREENING MAMMOGRAM, ENCOUNTER FOR: Primary | ICD-10-CM

## 2020-01-29 DIAGNOSIS — C54.1 LOW GRADE ENDOMETRIAL STROMAL SARCOMA OF UTERUS: ICD-10-CM

## 2020-01-29 PROCEDURE — 1126F PR PAIN SEVERITY QUANTIFIED, NO PAIN PRESENT: ICD-10-PCS | Mod: S$GLB,,, | Performed by: OBSTETRICS & GYNECOLOGY

## 2020-01-29 PROCEDURE — 99214 PR OFFICE/OUTPT VISIT, EST, LEVL IV, 30-39 MIN: ICD-10-PCS | Mod: S$GLB,,, | Performed by: OBSTETRICS & GYNECOLOGY

## 2020-01-29 PROCEDURE — 1159F MED LIST DOCD IN RCRD: CPT | Mod: S$GLB,,, | Performed by: OBSTETRICS & GYNECOLOGY

## 2020-01-29 PROCEDURE — 3078F PR MOST RECENT DIASTOLIC BLOOD PRESSURE < 80 MM HG: ICD-10-PCS | Mod: CPTII,S$GLB,, | Performed by: OBSTETRICS & GYNECOLOGY

## 2020-01-29 PROCEDURE — 99999 PR PBB SHADOW E&M-EST. PATIENT-LVL III: CPT | Mod: PBBFAC,,, | Performed by: OBSTETRICS & GYNECOLOGY

## 2020-01-29 PROCEDURE — 99999 PR PBB SHADOW E&M-EST. PATIENT-LVL III: ICD-10-PCS | Mod: PBBFAC,,, | Performed by: OBSTETRICS & GYNECOLOGY

## 2020-01-29 PROCEDURE — 3075F PR MOST RECENT SYSTOLIC BLOOD PRESS GE 130-139MM HG: ICD-10-PCS | Mod: CPTII,S$GLB,, | Performed by: OBSTETRICS & GYNECOLOGY

## 2020-01-29 PROCEDURE — 3075F SYST BP GE 130 - 139MM HG: CPT | Mod: CPTII,S$GLB,, | Performed by: OBSTETRICS & GYNECOLOGY

## 2020-01-29 PROCEDURE — 1126F AMNT PAIN NOTED NONE PRSNT: CPT | Mod: S$GLB,,, | Performed by: OBSTETRICS & GYNECOLOGY

## 2020-01-29 PROCEDURE — 99214 OFFICE O/P EST MOD 30 MIN: CPT | Mod: S$GLB,,, | Performed by: OBSTETRICS & GYNECOLOGY

## 2020-01-29 PROCEDURE — 3078F DIAST BP <80 MM HG: CPT | Mod: CPTII,S$GLB,, | Performed by: OBSTETRICS & GYNECOLOGY

## 2020-01-29 PROCEDURE — 1101F PR PT FALLS ASSESS DOC 0-1 FALLS W/OUT INJ PAST YR: ICD-10-PCS | Mod: CPTII,S$GLB,, | Performed by: OBSTETRICS & GYNECOLOGY

## 2020-01-29 PROCEDURE — 1159F PR MEDICATION LIST DOCUMENTED IN MEDICAL RECORD: ICD-10-PCS | Mod: S$GLB,,, | Performed by: OBSTETRICS & GYNECOLOGY

## 2020-01-29 PROCEDURE — 1101F PT FALLS ASSESS-DOCD LE1/YR: CPT | Mod: CPTII,S$GLB,, | Performed by: OBSTETRICS & GYNECOLOGY

## 2020-02-01 NOTE — PROGRESS NOTES
Subjective:      Patient ID: Skylar Valdez is a 69 y.o. female.    Chief Complaint: Follow-up (3mos fu)      HPI  Stage 1B low grade endometrial stromal sarcoma 2018.   Adjuvant letrozole  DEXA 2019  Posterior vagina biopsy- tissue consistent with LG-ESS, lesion essentially removed with biopsy forceps.     Presents today for surveillance visit. Without complaints. Specifically denies N/V, pain, swelling, bleeding, unexpected weight change, SOB, problems with bowel or bladder function.   Disease free interval 15 months.       History:  Referred by Dr. Henley for newly diagnosed low grade endometrial stromal sarcoma.      Postmenopausal bleeding. Underwent D&C.   18  FINAL PATHOLOGIC DIAGNOSIS  1., 2. FRAGMENTS OF ENDOMETRIAL TISSUE SHOWING A CELLULAR NEOPLASM, MOST CONSISTENT  WITH A LOW-GRADE ENDOMETRIAL STROMAL SARCOMA.     Pelvic US:  The uterus  measures  11.0 x 7.8 x 7.6 cm .     No prior abdominal surgeries.  x 2. Family history significant for daughter with breast cancer. No FH ovarian, uterine, or colon cancer.      CT C/A/P  Negative for metastatic disease     She is now s/p RA converted to ROSE MARIE BSO LND and omentectomy on 2018. Intraoperative findings include 15 week size uterus with multiple leimomyoma. Normal fallopian tubes and ovaries bilaterally. 1-2mm omental implant concerning for disease. Otherwise no gross visible intraperitoneal disease     Final pathology:  LOW-GRADE ENDOMETRIAL STROMAL SARCOMA WITH DEEP MYOMETRIAL INVASION (95%), Tumor size: 11 x 6 x 5 cm  ADJACENT ENDOMETRIUM COMPRESSED AND ATROPHIC;  SECTIONS OF CERVIX, PARACERVICAL, AND PARAMETRIAL MARGINS FREE OF MALIGNANCY;  LEFT FALLOPIAN TUBE: NO EVIDENCE OF MALIGNANCY;  LEFT OVARY: SMALL FOCUS OF SURFACE HYPERPLASIA;  RIGHT FALLOPIAN TUBE: NO EVIDENCE OF MALIGNANCY;  RIGHT OVARY: NO SIGNIFICANT HISTOLOGICAL ABNORMALITY  Lymph nodes and omentum negative for malignancy.  Review of Systems   Constitutional:  Negative for appetite change, chills, fatigue and fever.   HENT: Negative for mouth sores.    Respiratory: Negative for cough and shortness of breath.    Cardiovascular: Negative for leg swelling.   Gastrointestinal: Negative for abdominal pain, blood in stool, constipation and diarrhea.   Endocrine: Negative for cold intolerance.   Genitourinary: Negative for dysuria and vaginal bleeding.   Musculoskeletal: Negative for myalgias.   Skin: Negative for rash.   Allergic/Immunologic: Negative.    Neurological: Negative for weakness and numbness.   Hematological: Negative for adenopathy. Does not bruise/bleed easily.   Psychiatric/Behavioral: Negative for confusion.       Objective:   Physical Exam:   Constitutional: She is oriented to person, place, and time. She appears well-developed and well-nourished.    HENT:   Head: Normocephalic and atraumatic.    Eyes: Pupils are equal, round, and reactive to light. EOM are normal.    Neck: Normal range of motion. Neck supple. No thyromegaly present.    Cardiovascular: Normal rate, regular rhythm and intact distal pulses.     Pulmonary/Chest: Effort normal and breath sounds normal. No respiratory distress. She has no wheezes.        Abdominal: Soft. Bowel sounds are normal. She exhibits no distension, no ascites and no mass. There is no tenderness.     Genitourinary: Rectum normal and vagina normal. Pelvic exam was performed with patient supine. There is no lesion on the right labia. There is no lesion on the left labia. Uterus is absent. There is an absent adnexa. Right adnexum displays no mass. Left adnexum displays no mass. Vaginal cuff normal.Cervix exhibits absence.           Musculoskeletal: Normal range of motion and moves all extremeties.      Lymphadenopathy:     She has no cervical adenopathy.        Right: No inguinal and no supraclavicular adenopathy present.        Left: No inguinal and no supraclavicular adenopathy present.    Neurological: She is alert and  oriented to person, place, and time.    Skin: Skin is warm and dry. No rash noted.    Psychiatric: She has a normal mood and affect.       Assessment:     1. Screening mammogram, encounter for    2. 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 RTC 3 months or sooner if needed.

## 2020-02-08 ENCOUNTER — HOSPITAL ENCOUNTER (OUTPATIENT)
Dept: RADIOLOGY | Facility: HOSPITAL | Age: 70
Discharge: HOME | End: 2020-02-08
Attending: OBSTETRICS & GYNECOLOGY
Payer: COMMERCIAL

## 2020-02-08 VITALS — BODY MASS INDEX: 31.39 KG/M2 | WEIGHT: 200 LBS | HEIGHT: 67 IN

## 2020-02-08 DIAGNOSIS — Z12.31 SCREENING MAMMOGRAM, ENCOUNTER FOR: ICD-10-CM

## 2020-02-08 PROCEDURE — 77063 BREAST TOMOSYNTHESIS BI: CPT | Mod: 26,,, | Performed by: RADIOLOGY

## 2020-02-08 PROCEDURE — 77063 MAMMO DIGITAL SCREENING BILAT WITH TOMOSYNTHESIS_CAD: ICD-10-PCS | Mod: 26,,, | Performed by: RADIOLOGY

## 2020-02-08 PROCEDURE — 77067 SCR MAMMO BI INCL CAD: CPT | Mod: TC

## 2020-02-08 PROCEDURE — 77067 SCR MAMMO BI INCL CAD: CPT | Mod: 26,,, | Performed by: RADIOLOGY

## 2020-02-08 PROCEDURE — 77067 MAMMO DIGITAL SCREENING BILAT WITH TOMOSYNTHESIS_CAD: ICD-10-PCS | Mod: 26,,, | Performed by: RADIOLOGY

## 2020-03-05 ENCOUNTER — TELEPHONE (OUTPATIENT)
Dept: INTERNAL MEDICINE | Facility: CLINIC | Age: 70
End: 2020-03-05

## 2020-03-05 RX ORDER — LOSARTAN POTASSIUM 100 MG/1
100 TABLET ORAL DAILY
Qty: 90 TABLET | Refills: 2 | Status: CANCELLED | OUTPATIENT
Start: 2020-03-05 | End: 2021-03-05

## 2020-03-05 NOTE — TELEPHONE ENCOUNTER
----- Message from Ofe Bai sent at 3/5/2020 12:18 PM CST -----  Contact: Ananya @ [facility]  Name of Who is Calling: Ananya @ [facility]      What is the request in detail:Ananya @ [facility] states the patient's losartin 100 mg is on back order. Please contact to further advise.      Can the clinic reply by [facility]: NO      [facility]: [phone]

## 2020-04-15 DIAGNOSIS — C54.1 ENDOMETRIAL STROMAL SARCOMA: ICD-10-CM

## 2020-04-15 RX ORDER — LETROZOLE 2.5 MG/1
TABLET, FILM COATED ORAL
Qty: 90 TABLET | Refills: 1 | Status: SHIPPED | OUTPATIENT
Start: 2020-04-15 | End: 2020-08-13

## 2020-04-24 ENCOUNTER — TELEPHONE (OUTPATIENT)
Dept: GYNECOLOGIC ONCOLOGY | Facility: CLINIC | Age: 70
End: 2020-04-24

## 2020-04-24 NOTE — TELEPHONE ENCOUNTER
----- Message from Rosario Guadalupe sent at 4/24/2020 10:08 AM CDT -----  Contact: [facility] [[id number]]  Type:  Patient Returning Call    Who Called: [facility] AVILA [[id number]]    Who Left Message for Patient: unknown    Does the patient know what this is regarding?: no    Would the patient rather a call back or a response via [facility]? no    Best Call Back Number: [phone]    Additional Information:

## 2020-04-29 ENCOUNTER — OFFICE VISIT (OUTPATIENT)
Dept: GYNECOLOGIC ONCOLOGY | Facility: CLINIC | Age: 70
End: 2020-04-29
Payer: MEDICARE

## 2020-04-29 DIAGNOSIS — C54.1 LOW GRADE ENDOMETRIAL STROMAL SARCOMA OF UTERUS: Primary | ICD-10-CM

## 2020-04-29 PROCEDURE — 99214 OFFICE O/P EST MOD 30 MIN: CPT | Mod: GT,,, | Performed by: OBSTETRICS & GYNECOLOGY

## 2020-04-29 PROCEDURE — 1159F PR MEDICATION LIST DOCUMENTED IN MEDICAL RECORD: ICD-10-PCS | Mod: ,,, | Performed by: OBSTETRICS & GYNECOLOGY

## 2020-04-29 PROCEDURE — 1101F PR PT FALLS ASSESS DOC 0-1 FALLS W/OUT INJ PAST YR: ICD-10-PCS | Mod: CPTII,,, | Performed by: OBSTETRICS & GYNECOLOGY

## 2020-04-29 PROCEDURE — 1159F MED LIST DOCD IN RCRD: CPT | Mod: ,,, | Performed by: OBSTETRICS & GYNECOLOGY

## 2020-04-29 PROCEDURE — 99214 PR OFFICE/OUTPT VISIT, EST, LEVL IV, 30-39 MIN: ICD-10-PCS | Mod: GT,,, | Performed by: OBSTETRICS & GYNECOLOGY

## 2020-04-29 PROCEDURE — 1101F PT FALLS ASSESS-DOCD LE1/YR: CPT | Mod: CPTII,,, | Performed by: OBSTETRICS & GYNECOLOGY

## 2020-04-29 NOTE — PROGRESS NOTES
Subjective:      Patient ID: Skylar Valdez is a 70 y.o. female.    Chief Complaint: No chief complaint on file.      HPI  The patient location is: Home  The chief complaint leading to consultation is: Endometrial stromal sarcoma  Visit type: audiovisual  Total time spent with patient: 25min   Each patient to whom he or she provides medical services by telemedicine is:  (1) informed of the relationship between the physician and patient and the respective role of any other health care provider with respect to management of the patient; and (2) notified that he or she may decline to receive medical services by telemedicine and may withdraw from such care at any time.    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   Adjuvant letrozole  DEXA 2019  Posterior vagina biopsy- tissue consistent with LG-ESS, lesion essentially removed with biopsy forceps.     Presents today for surveillance visit. Without complaints. Specifically denies N/V, pain, swelling, bleeding, unexpected weight change, SOB, problems with bowel or bladder function.   Disease free interval 18 months.   MMG 2020 negative.       History:  Referred by Dr. Henley for newly diagnosed low grade endometrial stromal sarcoma.      Postmenopausal bleeding. Underwent D&C.   18  FINAL PATHOLOGIC DIAGNOSIS  1., 2. FRAGMENTS OF ENDOMETRIAL TISSUE SHOWING A CELLULAR NEOPLASM, MOST CONSISTENT  WITH A LOW-GRADE ENDOMETRIAL STROMAL SARCOMA.     Pelvic US:  The uterus  measures  11.0 x 7.8 x 7.6 cm .     No prior abdominal surgeries.  x 2. Family history significant for daughter with breast cancer. No FH ovarian, uterine, or colon cancer.      CT C/A/P  Negative for metastatic disease     She is now s/p RA converted to ROSE MARIE BSO LND and omentectomy on 2018. Intraoperative findings include 15 week size uterus with multiple leimomyoma. Normal fallopian tubes and ovaries bilaterally. 1-2mm omental implant concerning for disease.  Otherwise no gross visible intraperitoneal disease     Final pathology:  LOW-GRADE ENDOMETRIAL STROMAL SARCOMA WITH DEEP MYOMETRIAL INVASION (95%), Tumor size: 11 x 6 x 5 cm  ADJACENT ENDOMETRIUM COMPRESSED AND ATROPHIC;  SECTIONS OF CERVIX, PARACERVICAL, AND PARAMETRIAL MARGINS FREE OF MALIGNANCY;  LEFT FALLOPIAN TUBE: NO EVIDENCE OF MALIGNANCY;  LEFT OVARY: SMALL FOCUS OF SURFACE HYPERPLASIA;  RIGHT FALLOPIAN TUBE: NO EVIDENCE OF MALIGNANCY;  RIGHT OVARY: NO SIGNIFICANT HISTOLOGICAL ABNORMALITY  Lymph nodes and omentum negative for malignancy.  Review of Systems   Constitutional: Negative for appetite change, chills, fatigue and fever.   HENT: Negative for mouth sores.    Respiratory: Negative for cough and shortness of breath.    Cardiovascular: Negative for leg swelling.   Gastrointestinal: Negative for abdominal pain, blood in stool, constipation and diarrhea.   Endocrine: Negative for cold intolerance.   Genitourinary: Negative for dysuria and vaginal bleeding.   Musculoskeletal: Negative for myalgias.   Skin: Negative for rash.   Allergic/Immunologic: Negative.    Neurological: Negative for weakness and numbness.   Hematological: Negative for adenopathy. Does not bruise/bleed easily.   Psychiatric/Behavioral: Negative for confusion.       Objective:   Physical Exam:   Constitutional: She is oriented to person, place, and time. She appears well-developed and well-nourished. No distress.    HENT:   Head: Normocephalic and atraumatic.    Eyes: EOM are normal.    Neck: Normal range of motion.     Pulmonary/Chest: Effort normal.                  Musculoskeletal: Moves all extremeties.       Neurological: She is alert and oriented to person, place, and time.    Skin: Skin is dry. No pallor.    Psychiatric: She has a normal mood and affect. Her behavior is normal. Judgment and thought content normal.       Assessment:     1. 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    Appears to have no evidence of disease.   Feels well, no new symptoms, denies vaginal bleeding.   We discussed a limitation of our virtual visit during COVID pandemic is our inability to do a pelvic exam. I have asked her to call the office once stay at home restrictions are lifted to schedule a follow up appointment for an exam.   Disease free interval 18 months   Continue letrozole

## 2020-05-12 ENCOUNTER — PATIENT MESSAGE (OUTPATIENT)
Dept: ADMINISTRATIVE | Facility: HOSPITAL | Age: 70
End: 2020-05-12

## 2020-06-22 ENCOUNTER — TELEPHONE (OUTPATIENT)
Dept: PRIMARY CARE CLINIC | Facility: CLINIC | Age: 70
End: 2020-06-22

## 2020-08-11 ENCOUNTER — PATIENT OUTREACH (OUTPATIENT)
Dept: ADMINISTRATIVE | Facility: HOSPITAL | Age: 70
End: 2020-08-11

## 2020-08-19 ENCOUNTER — PATIENT OUTREACH (OUTPATIENT)
Dept: ADMINISTRATIVE | Facility: HOSPITAL | Age: 70
End: 2020-08-19

## 2020-08-19 DIAGNOSIS — Z12.11 COLON CANCER SCREENING: Primary | ICD-10-CM

## 2020-08-19 NOTE — PROGRESS NOTES
[employer] was given to patient on 8/19/2020 8:19 AM       Vashti TIPTON  Clinical Care Coordinator  Internal Medicine  [facility]  [phone]

## 2020-10-05 ENCOUNTER — PATIENT MESSAGE (OUTPATIENT)
Dept: ADMINISTRATIVE | Facility: HOSPITAL | Age: 70
End: 2020-10-05

## 2020-10-07 ENCOUNTER — PATIENT MESSAGE (OUTPATIENT)
Dept: ADMINISTRATIVE | Facility: HOSPITAL | Age: 70
End: 2020-10-07

## 2020-12-31 ENCOUNTER — PATIENT MESSAGE (OUTPATIENT)
Dept: OPHTHALMOLOGY | Facility: CLINIC | Age: 70
End: 2020-12-31

## 2021-01-05 ENCOUNTER — PATIENT MESSAGE (OUTPATIENT)
Dept: ADMINISTRATIVE | Facility: HOSPITAL | Age: 71
End: 2021-01-05

## 2021-01-19 ENCOUNTER — PATIENT MESSAGE (OUTPATIENT)
Dept: OPHTHALMOLOGY | Facility: CLINIC | Age: 71
End: 2021-01-19

## 2021-02-27 ENCOUNTER — PATIENT MESSAGE (OUTPATIENT)
Dept: GYNECOLOGIC ONCOLOGY | Facility: CLINIC | Age: 71
End: 2021-02-27

## 2021-03-03 ENCOUNTER — TELEPHONE (OUTPATIENT)
Dept: OBSTETRICS AND GYNECOLOGY | Facility: CLINIC | Age: 71
End: 2021-03-03

## 2021-03-16 ENCOUNTER — LAB VISIT (OUTPATIENT)
Dept: LAB | Facility: HOSPITAL | Age: 71
End: 2021-03-16
Attending: INTERNAL MEDICINE
Payer: COMMERCIAL

## 2021-03-16 ENCOUNTER — OFFICE VISIT (OUTPATIENT)
Dept: RHEUMATOLOGY | Facility: CLINIC | Age: 71
End: 2021-03-16
Payer: COMMERCIAL

## 2021-03-16 VITALS
OXYGEN SATURATION: 97 % | DIASTOLIC BLOOD PRESSURE: 75 MMHG | TEMPERATURE: 98 F | HEIGHT: 67 IN | BODY MASS INDEX: 31.32 KG/M2 | SYSTOLIC BLOOD PRESSURE: 134 MMHG | HEART RATE: 87 BPM

## 2021-03-16 DIAGNOSIS — Z79.899 HIGH RISK MEDICATION USE: ICD-10-CM

## 2021-03-16 DIAGNOSIS — M65.311 TRIGGER FINGER OF RIGHT THUMB: Primary | ICD-10-CM

## 2021-03-16 DIAGNOSIS — M54.50 CHRONIC MIDLINE LOW BACK PAIN WITHOUT SCIATICA: ICD-10-CM

## 2021-03-16 DIAGNOSIS — G89.29 CHRONIC MIDLINE LOW BACK PAIN WITHOUT SCIATICA: ICD-10-CM

## 2021-03-16 DIAGNOSIS — M75.81 TENDINITIS OF RIGHT ROTATOR CUFF: ICD-10-CM

## 2021-03-16 LAB
ALBUMIN SERPL BCP-MCNC: 3.7 G/DL (ref 3.5–5.2)
ALP SERPL-CCNC: 120 U/L (ref 55–135)
ALT SERPL W/O P-5'-P-CCNC: 21 U/L (ref 10–44)
ANION GAP SERPL CALC-SCNC: 9 MMOL/L (ref 8–16)
AST SERPL-CCNC: 24 U/L (ref 10–40)
BASOPHILS # BLD AUTO: 0.04 K/UL (ref 0–0.2)
BASOPHILS NFR BLD: 0.9 % (ref 0–1.9)
BILIRUB SERPL-MCNC: 1 MG/DL (ref 0.1–1)
BUN SERPL-MCNC: 13 MG/DL (ref 8–23)
CALCIUM SERPL-MCNC: 9.7 MG/DL (ref 8.7–10.5)
CHLORIDE SERPL-SCNC: 101 MMOL/L (ref 95–110)
CO2 SERPL-SCNC: 29 MMOL/L (ref 23–29)
CREAT SERPL-MCNC: 0.7 MG/DL (ref 0.5–1.4)
DIFFERENTIAL METHOD: ABNORMAL
EOSINOPHIL # BLD AUTO: 0.1 K/UL (ref 0–0.5)
EOSINOPHIL NFR BLD: 3 % (ref 0–8)
ERYTHROCYTE [DISTWIDTH] IN BLOOD BY AUTOMATED COUNT: 13 % (ref 11.5–14.5)
EST. GFR  (AFRICAN AMERICAN): >60 ML/MIN/1.73 M^2
EST. GFR  (NON AFRICAN AMERICAN): >60 ML/MIN/1.73 M^2
GLUCOSE SERPL-MCNC: 108 MG/DL (ref 70–110)
HCT VFR BLD AUTO: 41.3 % (ref 37–48.5)
HGB BLD-MCNC: 13 G/DL (ref 12–16)
IMM GRANULOCYTES # BLD AUTO: 0.01 K/UL (ref 0–0.04)
IMM GRANULOCYTES NFR BLD AUTO: 0.2 % (ref 0–0.5)
LYMPHOCYTES # BLD AUTO: 1.6 K/UL (ref 1–4.8)
LYMPHOCYTES NFR BLD: 33.8 % (ref 18–48)
MCH RBC QN AUTO: 27.3 PG (ref 27–31)
MCHC RBC AUTO-ENTMCNC: 31.5 G/DL (ref 32–36)
MCV RBC AUTO: 87 FL (ref 82–98)
MONOCYTES # BLD AUTO: 0.5 K/UL (ref 0.3–1)
MONOCYTES NFR BLD: 10.4 % (ref 4–15)
NEUTROPHILS # BLD AUTO: 2.4 K/UL (ref 1.8–7.7)
NEUTROPHILS NFR BLD: 51.7 % (ref 38–73)
NRBC BLD-RTO: 0 /100 WBC
PLATELET # BLD AUTO: 275 K/UL (ref 150–350)
PMV BLD AUTO: 11.8 FL (ref 9.2–12.9)
POTASSIUM SERPL-SCNC: 3.2 MMOL/L (ref 3.5–5.1)
PROT SERPL-MCNC: 8.2 G/DL (ref 6–8.4)
RBC # BLD AUTO: 4.76 M/UL (ref 4–5.4)
SODIUM SERPL-SCNC: 139 MMOL/L (ref 136–145)
WBC # BLD AUTO: 4.62 K/UL (ref 3.9–12.7)

## 2021-03-16 PROCEDURE — 1125F PR PAIN SEVERITY QUANTIFIED, PAIN PRESENT: ICD-10-PCS | Mod: S$GLB,,, | Performed by: INTERNAL MEDICINE

## 2021-03-16 PROCEDURE — 36415 COLL VENOUS BLD VENIPUNCTURE: CPT | Mod: PO | Performed by: INTERNAL MEDICINE

## 2021-03-16 PROCEDURE — 99204 OFFICE O/P NEW MOD 45 MIN: CPT | Mod: S$GLB,,, | Performed by: INTERNAL MEDICINE

## 2021-03-16 PROCEDURE — 3288F PR FALLS RISK ASSESSMENT DOCUMENTED: ICD-10-PCS | Mod: CPTII,S$GLB,, | Performed by: INTERNAL MEDICINE

## 2021-03-16 PROCEDURE — 3075F SYST BP GE 130 - 139MM HG: CPT | Mod: CPTII,S$GLB,, | Performed by: INTERNAL MEDICINE

## 2021-03-16 PROCEDURE — 1159F PR MEDICATION LIST DOCUMENTED IN MEDICAL RECORD: ICD-10-PCS | Mod: S$GLB,,, | Performed by: INTERNAL MEDICINE

## 2021-03-16 PROCEDURE — 99999 PR PBB SHADOW E&M-EST. PATIENT-LVL IV: CPT | Mod: PBBFAC,,, | Performed by: INTERNAL MEDICINE

## 2021-03-16 PROCEDURE — 1101F PT FALLS ASSESS-DOCD LE1/YR: CPT | Mod: CPTII,S$GLB,, | Performed by: INTERNAL MEDICINE

## 2021-03-16 PROCEDURE — 99999 PR PBB SHADOW E&M-EST. PATIENT-LVL IV: ICD-10-PCS | Mod: PBBFAC,,, | Performed by: INTERNAL MEDICINE

## 2021-03-16 PROCEDURE — 1159F MED LIST DOCD IN RCRD: CPT | Mod: S$GLB,,, | Performed by: INTERNAL MEDICINE

## 2021-03-16 PROCEDURE — 99204 PR OFFICE/OUTPT VISIT, NEW, LEVL IV, 45-59 MIN: ICD-10-PCS | Mod: S$GLB,,, | Performed by: INTERNAL MEDICINE

## 2021-03-16 PROCEDURE — 80053 COMPREHEN METABOLIC PANEL: CPT | Performed by: INTERNAL MEDICINE

## 2021-03-16 PROCEDURE — 1101F PR PT FALLS ASSESS DOC 0-1 FALLS W/OUT INJ PAST YR: ICD-10-PCS | Mod: CPTII,S$GLB,, | Performed by: INTERNAL MEDICINE

## 2021-03-16 PROCEDURE — 3008F BODY MASS INDEX DOCD: CPT | Mod: CPTII,S$GLB,, | Performed by: INTERNAL MEDICINE

## 2021-03-16 PROCEDURE — 1125F AMNT PAIN NOTED PAIN PRSNT: CPT | Mod: S$GLB,,, | Performed by: INTERNAL MEDICINE

## 2021-03-16 PROCEDURE — 85025 COMPLETE CBC W/AUTO DIFF WBC: CPT | Performed by: INTERNAL MEDICINE

## 2021-03-16 PROCEDURE — 3075F PR MOST RECENT SYSTOLIC BLOOD PRESS GE 130-139MM HG: ICD-10-PCS | Mod: CPTII,S$GLB,, | Performed by: INTERNAL MEDICINE

## 2021-03-16 PROCEDURE — 3288F FALL RISK ASSESSMENT DOCD: CPT | Mod: CPTII,S$GLB,, | Performed by: INTERNAL MEDICINE

## 2021-03-16 PROCEDURE — 3078F DIAST BP <80 MM HG: CPT | Mod: CPTII,S$GLB,, | Performed by: INTERNAL MEDICINE

## 2021-03-16 PROCEDURE — 3078F PR MOST RECENT DIASTOLIC BLOOD PRESSURE < 80 MM HG: ICD-10-PCS | Mod: CPTII,S$GLB,, | Performed by: INTERNAL MEDICINE

## 2021-03-16 PROCEDURE — 3008F PR BODY MASS INDEX (BMI) DOCUMENTED: ICD-10-PCS | Mod: CPTII,S$GLB,, | Performed by: INTERNAL MEDICINE

## 2021-03-16 RX ORDER — MELOXICAM 15 MG/1
15 TABLET ORAL DAILY
Qty: 30 TABLET | Refills: 2 | Status: SHIPPED | OUTPATIENT
Start: 2021-03-16

## 2021-03-17 ENCOUNTER — TELEPHONE (OUTPATIENT)
Dept: ADMINISTRATIVE | Facility: OTHER | Age: 71
End: 2021-03-17

## 2021-03-30 ENCOUNTER — OFFICE VISIT (OUTPATIENT)
Dept: GYNECOLOGIC ONCOLOGY | Facility: CLINIC | Age: 71
End: 2021-03-30
Payer: COMMERCIAL

## 2021-03-30 VITALS
WEIGHT: 202.94 LBS | BODY MASS INDEX: 31.78 KG/M2 | SYSTOLIC BLOOD PRESSURE: 135 MMHG | DIASTOLIC BLOOD PRESSURE: 65 MMHG | HEART RATE: 82 BPM

## 2021-03-30 DIAGNOSIS — C54.1 ENDOMETRIAL STROMAL SARCOMA: Primary | ICD-10-CM

## 2021-03-30 PROCEDURE — 1101F PR PT FALLS ASSESS DOC 0-1 FALLS W/OUT INJ PAST YR: ICD-10-PCS | Mod: CPTII,S$GLB,, | Performed by: OBSTETRICS & GYNECOLOGY

## 2021-03-30 PROCEDURE — 3008F PR BODY MASS INDEX (BMI) DOCUMENTED: ICD-10-PCS | Mod: CPTII,S$GLB,, | Performed by: OBSTETRICS & GYNECOLOGY

## 2021-03-30 PROCEDURE — 3008F BODY MASS INDEX DOCD: CPT | Mod: CPTII,S$GLB,, | Performed by: OBSTETRICS & GYNECOLOGY

## 2021-03-30 PROCEDURE — 99999 PR PBB SHADOW E&M-EST. PATIENT-LVL III: ICD-10-PCS | Mod: PBBFAC,,, | Performed by: OBSTETRICS & GYNECOLOGY

## 2021-03-30 PROCEDURE — 3288F FALL RISK ASSESSMENT DOCD: CPT | Mod: CPTII,S$GLB,, | Performed by: OBSTETRICS & GYNECOLOGY

## 2021-03-30 PROCEDURE — 1126F PR PAIN SEVERITY QUANTIFIED, NO PAIN PRESENT: ICD-10-PCS | Mod: S$GLB,,, | Performed by: OBSTETRICS & GYNECOLOGY

## 2021-03-30 PROCEDURE — 99213 PR OFFICE/OUTPT VISIT, EST, LEVL III, 20-29 MIN: ICD-10-PCS | Mod: S$GLB,,, | Performed by: OBSTETRICS & GYNECOLOGY

## 2021-03-30 PROCEDURE — 99999 PR PBB SHADOW E&M-EST. PATIENT-LVL III: CPT | Mod: PBBFAC,,, | Performed by: OBSTETRICS & GYNECOLOGY

## 2021-03-30 PROCEDURE — 1159F MED LIST DOCD IN RCRD: CPT | Mod: S$GLB,,, | Performed by: OBSTETRICS & GYNECOLOGY

## 2021-03-30 PROCEDURE — 1101F PT FALLS ASSESS-DOCD LE1/YR: CPT | Mod: CPTII,S$GLB,, | Performed by: OBSTETRICS & GYNECOLOGY

## 2021-03-30 PROCEDURE — 3078F PR MOST RECENT DIASTOLIC BLOOD PRESSURE < 80 MM HG: ICD-10-PCS | Mod: CPTII,S$GLB,, | Performed by: OBSTETRICS & GYNECOLOGY

## 2021-03-30 PROCEDURE — 3075F SYST BP GE 130 - 139MM HG: CPT | Mod: CPTII,S$GLB,, | Performed by: OBSTETRICS & GYNECOLOGY

## 2021-03-30 PROCEDURE — 1159F PR MEDICATION LIST DOCUMENTED IN MEDICAL RECORD: ICD-10-PCS | Mod: S$GLB,,, | Performed by: OBSTETRICS & GYNECOLOGY

## 2021-03-30 PROCEDURE — 3078F DIAST BP <80 MM HG: CPT | Mod: CPTII,S$GLB,, | Performed by: OBSTETRICS & GYNECOLOGY

## 2021-03-30 PROCEDURE — 3288F PR FALLS RISK ASSESSMENT DOCUMENTED: ICD-10-PCS | Mod: CPTII,S$GLB,, | Performed by: OBSTETRICS & GYNECOLOGY

## 2021-03-30 PROCEDURE — 99213 OFFICE O/P EST LOW 20 MIN: CPT | Mod: S$GLB,,, | Performed by: OBSTETRICS & GYNECOLOGY

## 2021-03-30 PROCEDURE — 3075F PR MOST RECENT SYSTOLIC BLOOD PRESS GE 130-139MM HG: ICD-10-PCS | Mod: CPTII,S$GLB,, | Performed by: OBSTETRICS & GYNECOLOGY

## 2021-03-30 PROCEDURE — 1126F AMNT PAIN NOTED NONE PRSNT: CPT | Mod: S$GLB,,, | Performed by: OBSTETRICS & GYNECOLOGY

## 2021-03-30 RX ORDER — IRBESARTAN 150 MG/1
150 TABLET ORAL DAILY
COMMUNITY
Start: 2020-12-19

## 2021-04-05 ENCOUNTER — PATIENT MESSAGE (OUTPATIENT)
Dept: GYNECOLOGIC ONCOLOGY | Facility: CLINIC | Age: 71
End: 2021-04-05

## 2021-04-05 ENCOUNTER — TELEPHONE (OUTPATIENT)
Dept: GYNECOLOGIC ONCOLOGY | Facility: CLINIC | Age: 71
End: 2021-04-05

## 2021-04-05 ENCOUNTER — PATIENT MESSAGE (OUTPATIENT)
Dept: ADMINISTRATIVE | Facility: HOSPITAL | Age: 71
End: 2021-04-05

## 2021-04-05 ENCOUNTER — HOSPITAL ENCOUNTER (OUTPATIENT)
Dept: RADIOLOGY | Facility: HOSPITAL | Age: 71
Discharge: HOME | End: 2021-04-05
Attending: OBSTETRICS & GYNECOLOGY
Payer: COMMERCIAL

## 2021-04-05 DIAGNOSIS — C54.1 ENDOMETRIAL STROMAL SARCOMA: ICD-10-CM

## 2021-04-05 PROCEDURE — 25500020 PHARM REV CODE 255: Mod: PO | Performed by: OBSTETRICS & GYNECOLOGY

## 2021-04-05 PROCEDURE — 74177 CT ABD & PELVIS W/CONTRAST: CPT | Mod: TC,PO

## 2021-04-05 RX ADMIN — IOHEXOL 100 ML: 350 INJECTION, SOLUTION INTRAVENOUS at 12:04

## 2021-04-05 RX ADMIN — IOHEXOL 30 ML: 300 INJECTION, SOLUTION INTRAVENOUS at 12:04

## 2021-04-23 ENCOUNTER — PATIENT MESSAGE (OUTPATIENT)
Dept: GYNECOLOGIC ONCOLOGY | Facility: CLINIC | Age: 71
End: 2021-04-23

## 2021-06-30 ENCOUNTER — TELEPHONE (OUTPATIENT)
Dept: GYNECOLOGIC ONCOLOGY | Facility: CLINIC | Age: 71
End: 2021-06-30

## 2021-06-30 ENCOUNTER — OFFICE VISIT (OUTPATIENT)
Dept: GYNECOLOGIC ONCOLOGY | Facility: CLINIC | Age: 71
End: 2021-06-30
Payer: COMMERCIAL

## 2021-06-30 VITALS
HEART RATE: 85 BPM | WEIGHT: 199 LBS | SYSTOLIC BLOOD PRESSURE: 132 MMHG | DIASTOLIC BLOOD PRESSURE: 62 MMHG | BODY MASS INDEX: 31.17 KG/M2

## 2021-06-30 DIAGNOSIS — C54.1 LOW GRADE ENDOMETRIAL STROMAL SARCOMA OF UTERUS: ICD-10-CM

## 2021-06-30 DIAGNOSIS — N89.8 VAGINAL LESION: ICD-10-CM

## 2021-06-30 DIAGNOSIS — C54.1 ENDOMETRIAL STROMAL SARCOMA: Primary | ICD-10-CM

## 2021-06-30 DIAGNOSIS — C54.1 LOW GRADE ENDOMETRIAL STROMAL SARCOMA OF UTERUS: Primary | ICD-10-CM

## 2021-06-30 PROCEDURE — 1101F PR PT FALLS ASSESS DOC 0-1 FALLS W/OUT INJ PAST YR: ICD-10-PCS | Mod: CPTII,S$GLB,, | Performed by: OBSTETRICS & GYNECOLOGY

## 2021-06-30 PROCEDURE — 99214 PR OFFICE/OUTPT VISIT, EST, LEVL IV, 30-39 MIN: ICD-10-PCS | Mod: S$GLB,,, | Performed by: OBSTETRICS & GYNECOLOGY

## 2021-06-30 PROCEDURE — 99999 PR PBB SHADOW E&M-EST. PATIENT-LVL III: ICD-10-PCS | Mod: PBBFAC,,, | Performed by: OBSTETRICS & GYNECOLOGY

## 2021-06-30 PROCEDURE — 1126F PR PAIN SEVERITY QUANTIFIED, NO PAIN PRESENT: ICD-10-PCS | Mod: S$GLB,,, | Performed by: OBSTETRICS & GYNECOLOGY

## 2021-06-30 PROCEDURE — 1159F PR MEDICATION LIST DOCUMENTED IN MEDICAL RECORD: ICD-10-PCS | Mod: S$GLB,,, | Performed by: OBSTETRICS & GYNECOLOGY

## 2021-06-30 PROCEDURE — 1101F PT FALLS ASSESS-DOCD LE1/YR: CPT | Mod: CPTII,S$GLB,, | Performed by: OBSTETRICS & GYNECOLOGY

## 2021-06-30 PROCEDURE — 99999 PR PBB SHADOW E&M-EST. PATIENT-LVL III: CPT | Mod: PBBFAC,,, | Performed by: OBSTETRICS & GYNECOLOGY

## 2021-06-30 PROCEDURE — 3008F PR BODY MASS INDEX (BMI) DOCUMENTED: ICD-10-PCS | Mod: CPTII,S$GLB,, | Performed by: OBSTETRICS & GYNECOLOGY

## 2021-06-30 PROCEDURE — 3288F PR FALLS RISK ASSESSMENT DOCUMENTED: ICD-10-PCS | Mod: CPTII,S$GLB,, | Performed by: OBSTETRICS & GYNECOLOGY

## 2021-06-30 PROCEDURE — 1126F AMNT PAIN NOTED NONE PRSNT: CPT | Mod: S$GLB,,, | Performed by: OBSTETRICS & GYNECOLOGY

## 2021-06-30 PROCEDURE — 3288F FALL RISK ASSESSMENT DOCD: CPT | Mod: CPTII,S$GLB,, | Performed by: OBSTETRICS & GYNECOLOGY

## 2021-06-30 PROCEDURE — 1159F MED LIST DOCD IN RCRD: CPT | Mod: S$GLB,,, | Performed by: OBSTETRICS & GYNECOLOGY

## 2021-06-30 PROCEDURE — 99214 OFFICE O/P EST MOD 30 MIN: CPT | Mod: S$GLB,,, | Performed by: OBSTETRICS & GYNECOLOGY

## 2021-06-30 PROCEDURE — 3008F BODY MASS INDEX DOCD: CPT | Mod: CPTII,S$GLB,, | Performed by: OBSTETRICS & GYNECOLOGY

## 2021-07-09 PROBLEM — N89.8 VAGINAL LESION: Status: ACTIVE | Noted: 2021-07-09

## 2021-07-09 RX ORDER — LIDOCAINE HYDROCHLORIDE 10 MG/ML
1 INJECTION, SOLUTION EPIDURAL; INFILTRATION; INTRACAUDAL; PERINEURAL ONCE
Status: CANCELLED | OUTPATIENT
Start: 2021-07-09 | End: 2021-07-09

## 2021-07-09 RX ORDER — SODIUM CHLORIDE 9 MG/ML
INJECTION, SOLUTION INTRAVENOUS CONTINUOUS
Status: CANCELLED | OUTPATIENT
Start: 2021-07-09

## 2021-07-22 ENCOUNTER — PATIENT MESSAGE (OUTPATIENT)
Dept: GYNECOLOGIC ONCOLOGY | Facility: CLINIC | Age: 71
End: 2021-07-22

## 2021-07-24 ENCOUNTER — ANESTHESIA EVENT (OUTPATIENT)
Dept: SURGERY | Facility: HOSPITAL | Age: 71
End: 2021-07-24
Payer: COMMERCIAL

## 2021-07-26 ENCOUNTER — HOSPITAL ENCOUNTER (OUTPATIENT)
Facility: HOSPITAL | Age: 71
Discharge: HOME | End: 2021-07-26
Attending: OBSTETRICS & GYNECOLOGY | Admitting: OBSTETRICS & GYNECOLOGY
Payer: COMMERCIAL

## 2021-07-26 ENCOUNTER — ANESTHESIA (OUTPATIENT)
Dept: SURGERY | Facility: HOSPITAL | Age: 71
End: 2021-07-26
Payer: COMMERCIAL

## 2021-07-26 VITALS
HEIGHT: 67 IN | BODY MASS INDEX: 31.23 KG/M2 | WEIGHT: 199 LBS | RESPIRATION RATE: 18 BRPM | DIASTOLIC BLOOD PRESSURE: 72 MMHG | TEMPERATURE: 97 F | SYSTOLIC BLOOD PRESSURE: 139 MMHG | HEART RATE: 67 BPM | OXYGEN SATURATION: 97 %

## 2021-07-26 DIAGNOSIS — N89.8 VAGINAL LESION: Primary | ICD-10-CM

## 2021-07-26 DIAGNOSIS — C54.1 LOW GRADE ENDOMETRIAL STROMAL SARCOMA OF UTERUS: ICD-10-CM

## 2021-07-26 LAB
ABO + RH BLD: NORMAL
BLD GP AB SCN CELLS X3 SERPL QL: NORMAL
POCT GLUCOSE: 109 MG/DL (ref 70–110)

## 2021-07-26 PROCEDURE — 57135 EXCISION VAGINAL CYST/TUMOR: CPT | Mod: ,,, | Performed by: OBSTETRICS & GYNECOLOGY

## 2021-07-26 PROCEDURE — 57135 PR EXCIS VAGINAL CYST/TUMOR: ICD-10-PCS | Mod: ,,, | Performed by: OBSTETRICS & GYNECOLOGY

## 2021-07-26 PROCEDURE — 88305 TISSUE EXAM BY PATHOLOGIST: CPT | Mod: 26,,, | Performed by: PATHOLOGY

## 2021-07-26 PROCEDURE — 82962 GLUCOSE BLOOD TEST: CPT | Performed by: OBSTETRICS & GYNECOLOGY

## 2021-07-26 PROCEDURE — 71000015 HC POSTOP RECOV 1ST HR: Performed by: OBSTETRICS & GYNECOLOGY

## 2021-07-26 PROCEDURE — 63600175 PHARM REV CODE 636 W HCPCS: Performed by: STUDENT IN AN ORGANIZED HEALTH CARE EDUCATION/TRAINING PROGRAM

## 2021-07-26 PROCEDURE — 37000009 HC ANESTHESIA EA ADD 15 MINS: Performed by: OBSTETRICS & GYNECOLOGY

## 2021-07-26 PROCEDURE — 88341 IMHCHEM/IMCYTCHM EA ADD ANTB: CPT | Performed by: PATHOLOGY

## 2021-07-26 PROCEDURE — 25000003 PHARM REV CODE 250: Performed by: STUDENT IN AN ORGANIZED HEALTH CARE EDUCATION/TRAINING PROGRAM

## 2021-07-26 PROCEDURE — 88305 TISSUE EXAM BY PATHOLOGIST: CPT | Performed by: PATHOLOGY

## 2021-07-26 PROCEDURE — 88342 IMHCHEM/IMCYTCHM 1ST ANTB: CPT | Mod: 26,,, | Performed by: PATHOLOGY

## 2021-07-26 PROCEDURE — D9220A PRA ANESTHESIA: Mod: ,,, | Performed by: ANESTHESIOLOGY

## 2021-07-26 PROCEDURE — 27201037 HC PRESSURE MONITORING SET UP

## 2021-07-26 PROCEDURE — 88342 CHG IMMUNOCYTOCHEMISTRY: ICD-10-PCS | Mod: 26,,, | Performed by: PATHOLOGY

## 2021-07-26 PROCEDURE — 88341 IMHCHEM/IMCYTCHM EA ADD ANTB: CPT | Mod: 26,,, | Performed by: PATHOLOGY

## 2021-07-26 PROCEDURE — 88341 PR IHC OR ICC EACH ADD'L SINGLE ANTIBODY  STAINPR: ICD-10-PCS | Mod: 26,,, | Performed by: PATHOLOGY

## 2021-07-26 PROCEDURE — 37000008 HC ANESTHESIA 1ST 15 MINUTES: Performed by: OBSTETRICS & GYNECOLOGY

## 2021-07-26 PROCEDURE — 25000003 PHARM REV CODE 250: Performed by: OBSTETRICS & GYNECOLOGY

## 2021-07-26 PROCEDURE — 36000706: Performed by: OBSTETRICS & GYNECOLOGY

## 2021-07-26 PROCEDURE — 88305 TISSUE EXAM BY PATHOLOGIST: ICD-10-PCS | Mod: 26,,, | Performed by: PATHOLOGY

## 2021-07-26 PROCEDURE — 86900 BLOOD TYPING SEROLOGIC ABO: CPT | Performed by: OBSTETRICS & GYNECOLOGY

## 2021-07-26 PROCEDURE — 71000044 HC DOSC ROUTINE RECOVERY FIRST HOUR: Performed by: OBSTETRICS & GYNECOLOGY

## 2021-07-26 PROCEDURE — 88342 IMHCHEM/IMCYTCHM 1ST ANTB: CPT | Performed by: PATHOLOGY

## 2021-07-26 PROCEDURE — D9220A PRA ANESTHESIA: ICD-10-PCS | Mod: ,,, | Performed by: ANESTHESIOLOGY

## 2021-07-26 PROCEDURE — 36000707: Performed by: OBSTETRICS & GYNECOLOGY

## 2021-07-26 RX ORDER — HYDROMORPHONE HYDROCHLORIDE 1 MG/ML
0.2 INJECTION, SOLUTION INTRAMUSCULAR; INTRAVENOUS; SUBCUTANEOUS
Status: DISCONTINUED | OUTPATIENT
Start: 2021-07-26 | End: 2021-07-26 | Stop reason: HOSPADM

## 2021-07-26 RX ORDER — SODIUM CHLORIDE 0.9 % (FLUSH) 0.9 %
10 SYRINGE (ML) INJECTION
Status: DISCONTINUED | OUTPATIENT
Start: 2021-07-26 | End: 2021-07-26 | Stop reason: HOSPADM

## 2021-07-26 RX ORDER — ACETAMINOPHEN 500 MG
1000 TABLET ORAL ONCE
Status: COMPLETED | OUTPATIENT
Start: 2021-07-26 | End: 2021-07-26

## 2021-07-26 RX ORDER — LIDOCAINE HYDROCHLORIDE 20 MG/ML
INJECTION, SOLUTION EPIDURAL; INFILTRATION; INTRACAUDAL; PERINEURAL
Status: DISCONTINUED | OUTPATIENT
Start: 2021-07-26 | End: 2021-07-26

## 2021-07-26 RX ORDER — MIDAZOLAM HYDROCHLORIDE 1 MG/ML
INJECTION, SOLUTION INTRAMUSCULAR; INTRAVENOUS
Status: DISCONTINUED | OUTPATIENT
Start: 2021-07-26 | End: 2021-07-26

## 2021-07-26 RX ORDER — ONDANSETRON 2 MG/ML
4 INJECTION INTRAMUSCULAR; INTRAVENOUS EVERY 4 HOURS PRN
Status: DISCONTINUED | OUTPATIENT
Start: 2021-07-26 | End: 2021-07-26 | Stop reason: HOSPADM

## 2021-07-26 RX ORDER — LIDOCAINE HYDROCHLORIDE 10 MG/ML
1 INJECTION, SOLUTION EPIDURAL; INFILTRATION; INTRACAUDAL; PERINEURAL ONCE
Status: COMPLETED | OUTPATIENT
Start: 2021-07-26 | End: 2021-07-26

## 2021-07-26 RX ORDER — PROCHLORPERAZINE EDISYLATE 5 MG/ML
5 INJECTION INTRAMUSCULAR; INTRAVENOUS EVERY 30 MIN PRN
Status: DISCONTINUED | OUTPATIENT
Start: 2021-07-26 | End: 2021-07-26 | Stop reason: HOSPADM

## 2021-07-26 RX ORDER — ACETAMINOPHEN 325 MG/1
650 TABLET ORAL EVERY 6 HOURS PRN
Status: DISCONTINUED | OUTPATIENT
Start: 2021-07-26 | End: 2021-07-26

## 2021-07-26 RX ORDER — SODIUM CHLORIDE 9 MG/ML
INJECTION, SOLUTION INTRAVENOUS CONTINUOUS
Status: DISCONTINUED | OUTPATIENT
Start: 2021-07-26 | End: 2021-07-26 | Stop reason: HOSPADM

## 2021-07-26 RX ORDER — FENTANYL CITRATE 50 UG/ML
25 INJECTION, SOLUTION INTRAMUSCULAR; INTRAVENOUS EVERY 5 MIN PRN
Status: DISCONTINUED | OUTPATIENT
Start: 2021-07-26 | End: 2021-07-26 | Stop reason: HOSPADM

## 2021-07-26 RX ORDER — KETOROLAC TROMETHAMINE 30 MG/ML
15 INJECTION, SOLUTION INTRAMUSCULAR; INTRAVENOUS EVERY 6 HOURS PRN
Status: DISCONTINUED | OUTPATIENT
Start: 2021-07-26 | End: 2021-07-26 | Stop reason: HOSPADM

## 2021-07-26 RX ORDER — DEXAMETHASONE SODIUM PHOSPHATE 4 MG/ML
INJECTION, SOLUTION INTRA-ARTICULAR; INTRALESIONAL; INTRAMUSCULAR; INTRAVENOUS; SOFT TISSUE
Status: DISCONTINUED | OUTPATIENT
Start: 2021-07-26 | End: 2021-07-26

## 2021-07-26 RX ORDER — PROPOFOL 10 MG/ML
VIAL (ML) INTRAVENOUS
Status: DISCONTINUED | OUTPATIENT
Start: 2021-07-26 | End: 2021-07-26

## 2021-07-26 RX ORDER — OXYCODONE AND ACETAMINOPHEN 5; 325 MG/1; MG/1
1 TABLET ORAL EVERY 4 HOURS PRN
Qty: 5 TABLET | Refills: 0 | Status: SHIPPED | OUTPATIENT
Start: 2021-07-26 | End: 2021-08-12 | Stop reason: ALTCHOICE

## 2021-07-26 RX ORDER — ACETAMINOPHEN 500 MG
1000 TABLET ORAL EVERY 6 HOURS PRN
Status: DISCONTINUED | OUTPATIENT
Start: 2021-07-26 | End: 2021-07-26 | Stop reason: HOSPADM

## 2021-07-26 RX ADMIN — MIDAZOLAM 2 MG: 1 INJECTION INTRAMUSCULAR; INTRAVENOUS at 06:07

## 2021-07-26 RX ADMIN — FENTANYL CITRATE 50 MCG: 50 INJECTION INTRAMUSCULAR; INTRAVENOUS at 07:07

## 2021-07-26 RX ADMIN — ACETAMINOPHEN 1000 MG: 500 TABLET ORAL at 06:07

## 2021-07-26 RX ADMIN — FENTANYL CITRATE 25 MCG: 50 INJECTION INTRAMUSCULAR; INTRAVENOUS at 07:07

## 2021-07-26 RX ADMIN — LIDOCAINE HYDROCHLORIDE 0.1 MG: 10 INJECTION, SOLUTION EPIDURAL; INFILTRATION; INTRACAUDAL at 06:07

## 2021-07-26 RX ADMIN — DEXAMETHASONE SODIUM PHOSPHATE 4 MG: 4 INJECTION INTRA-ARTICULAR; INTRALESIONAL; INTRAMUSCULAR; INTRAVENOUS; SOFT TISSUE at 07:07

## 2021-07-26 RX ADMIN — PROPOFOL 200 MG: 10 INJECTION, EMULSION INTRAVENOUS at 07:07

## 2021-07-26 RX ADMIN — LIDOCAINE HYDROCHLORIDE 100 MG: 20 INJECTION, SOLUTION EPIDURAL; INFILTRATION; INTRACAUDAL at 07:07

## 2021-07-26 RX ADMIN — SODIUM CHLORIDE: 9 INJECTION, SOLUTION INTRAVENOUS at 06:07

## 2021-07-30 LAB
COMMENT: NORMAL
FINAL PATHOLOGIC DIAGNOSIS: NORMAL
GROSS: NORMAL
Lab: NORMAL

## 2021-08-02 ENCOUNTER — PATIENT MESSAGE (OUTPATIENT)
Dept: GYNECOLOGIC ONCOLOGY | Facility: CLINIC | Age: 71
End: 2021-08-02

## 2021-08-12 ENCOUNTER — OFFICE VISIT (OUTPATIENT)
Dept: GYNECOLOGIC ONCOLOGY | Facility: CLINIC | Age: 71
End: 2021-08-12
Payer: COMMERCIAL

## 2021-08-12 VITALS
BODY MASS INDEX: 30.93 KG/M2 | WEIGHT: 197.5 LBS | DIASTOLIC BLOOD PRESSURE: 90 MMHG | HEART RATE: 76 BPM | SYSTOLIC BLOOD PRESSURE: 133 MMHG

## 2021-08-12 DIAGNOSIS — C54.1 LOW GRADE ENDOMETRIAL STROMAL SARCOMA OF UTERUS: Primary | ICD-10-CM

## 2021-08-12 PROCEDURE — 99999 PR PBB SHADOW E&M-EST. PATIENT-LVL III: ICD-10-PCS | Mod: PBBFAC,,, | Performed by: OBSTETRICS & GYNECOLOGY

## 2021-08-12 PROCEDURE — 3075F SYST BP GE 130 - 139MM HG: CPT | Mod: CPTII,S$GLB,, | Performed by: OBSTETRICS & GYNECOLOGY

## 2021-08-12 PROCEDURE — 3080F PR MOST RECENT DIASTOLIC BLOOD PRESSURE >= 90 MM HG: ICD-10-PCS | Mod: CPTII,S$GLB,, | Performed by: OBSTETRICS & GYNECOLOGY

## 2021-08-12 PROCEDURE — 3008F PR BODY MASS INDEX (BMI) DOCUMENTED: ICD-10-PCS | Mod: CPTII,S$GLB,, | Performed by: OBSTETRICS & GYNECOLOGY

## 2021-08-12 PROCEDURE — 3288F PR FALLS RISK ASSESSMENT DOCUMENTED: ICD-10-PCS | Mod: CPTII,S$GLB,, | Performed by: OBSTETRICS & GYNECOLOGY

## 2021-08-12 PROCEDURE — 1160F RVW MEDS BY RX/DR IN RCRD: CPT | Mod: CPTII,S$GLB,, | Performed by: OBSTETRICS & GYNECOLOGY

## 2021-08-12 PROCEDURE — 99024 PR POST-OP FOLLOW-UP VISIT: ICD-10-PCS | Mod: S$GLB,,, | Performed by: OBSTETRICS & GYNECOLOGY

## 2021-08-12 PROCEDURE — 3288F FALL RISK ASSESSMENT DOCD: CPT | Mod: CPTII,S$GLB,, | Performed by: OBSTETRICS & GYNECOLOGY

## 2021-08-12 PROCEDURE — 3075F PR MOST RECENT SYSTOLIC BLOOD PRESS GE 130-139MM HG: ICD-10-PCS | Mod: CPTII,S$GLB,, | Performed by: OBSTETRICS & GYNECOLOGY

## 2021-08-12 PROCEDURE — 1126F AMNT PAIN NOTED NONE PRSNT: CPT | Mod: CPTII,S$GLB,, | Performed by: OBSTETRICS & GYNECOLOGY

## 2021-08-12 PROCEDURE — 1159F MED LIST DOCD IN RCRD: CPT | Mod: CPTII,S$GLB,, | Performed by: OBSTETRICS & GYNECOLOGY

## 2021-08-12 PROCEDURE — 1159F PR MEDICATION LIST DOCUMENTED IN MEDICAL RECORD: ICD-10-PCS | Mod: CPTII,S$GLB,, | Performed by: OBSTETRICS & GYNECOLOGY

## 2021-08-12 PROCEDURE — 1160F PR REVIEW ALL MEDS BY PRESCRIBER/CLIN PHARMACIST DOCUMENTED: ICD-10-PCS | Mod: CPTII,S$GLB,, | Performed by: OBSTETRICS & GYNECOLOGY

## 2021-08-12 PROCEDURE — 1101F PT FALLS ASSESS-DOCD LE1/YR: CPT | Mod: CPTII,S$GLB,, | Performed by: OBSTETRICS & GYNECOLOGY

## 2021-08-12 PROCEDURE — 3080F DIAST BP >= 90 MM HG: CPT | Mod: CPTII,S$GLB,, | Performed by: OBSTETRICS & GYNECOLOGY

## 2021-08-12 PROCEDURE — 99999 PR PBB SHADOW E&M-EST. PATIENT-LVL III: CPT | Mod: PBBFAC,,, | Performed by: OBSTETRICS & GYNECOLOGY

## 2021-08-12 PROCEDURE — 1126F PR PAIN SEVERITY QUANTIFIED, NO PAIN PRESENT: ICD-10-PCS | Mod: CPTII,S$GLB,, | Performed by: OBSTETRICS & GYNECOLOGY

## 2021-08-12 PROCEDURE — 99024 POSTOP FOLLOW-UP VISIT: CPT | Mod: S$GLB,,, | Performed by: OBSTETRICS & GYNECOLOGY

## 2021-08-12 PROCEDURE — 1101F PR PT FALLS ASSESS DOC 0-1 FALLS W/OUT INJ PAST YR: ICD-10-PCS | Mod: CPTII,S$GLB,, | Performed by: OBSTETRICS & GYNECOLOGY

## 2021-08-12 PROCEDURE — 3008F BODY MASS INDEX DOCD: CPT | Mod: CPTII,S$GLB,, | Performed by: OBSTETRICS & GYNECOLOGY

## 2021-10-05 ENCOUNTER — PATIENT MESSAGE (OUTPATIENT)
Dept: ADMINISTRATIVE | Facility: HOSPITAL | Age: 71
End: 2021-10-05

## 2021-11-22 ENCOUNTER — TELEPHONE (OUTPATIENT)
Dept: GYNECOLOGIC ONCOLOGY | Facility: CLINIC | Age: 71
End: 2021-11-22
Payer: COMMERCIAL

## 2021-11-24 ENCOUNTER — OFFICE VISIT (OUTPATIENT)
Dept: GYNECOLOGIC ONCOLOGY | Facility: CLINIC | Age: 71
End: 2021-11-24
Payer: COMMERCIAL

## 2021-11-24 VITALS — DIASTOLIC BLOOD PRESSURE: 65 MMHG | SYSTOLIC BLOOD PRESSURE: 137 MMHG | HEART RATE: 70 BPM

## 2021-11-24 DIAGNOSIS — C54.1 LOW GRADE ENDOMETRIAL STROMAL SARCOMA OF UTERUS: Primary | ICD-10-CM

## 2021-11-24 PROCEDURE — 99999 PR PBB SHADOW E&M-EST. PATIENT-LVL III: CPT | Mod: PBBFAC,,, | Performed by: OBSTETRICS & GYNECOLOGY

## 2021-11-24 PROCEDURE — 99214 OFFICE O/P EST MOD 30 MIN: CPT | Mod: S$GLB,,, | Performed by: OBSTETRICS & GYNECOLOGY

## 2021-11-24 PROCEDURE — 99999 PR PBB SHADOW E&M-EST. PATIENT-LVL III: ICD-10-PCS | Mod: PBBFAC,,, | Performed by: OBSTETRICS & GYNECOLOGY

## 2021-11-24 PROCEDURE — 99214 PR OFFICE/OUTPT VISIT, EST, LEVL IV, 30-39 MIN: ICD-10-PCS | Mod: S$GLB,,, | Performed by: OBSTETRICS & GYNECOLOGY

## 2021-11-24 PROCEDURE — 4010F PR ACE/ARB THEARPY RXD/TAKEN: ICD-10-PCS | Mod: CPTII,S$GLB,, | Performed by: OBSTETRICS & GYNECOLOGY

## 2021-11-24 PROCEDURE — 4010F ACE/ARB THERAPY RXD/TAKEN: CPT | Mod: CPTII,S$GLB,, | Performed by: OBSTETRICS & GYNECOLOGY

## 2022-02-24 ENCOUNTER — OFFICE VISIT (OUTPATIENT)
Dept: GYNECOLOGIC ONCOLOGY | Facility: CLINIC | Age: 72
End: 2022-02-24
Payer: COMMERCIAL

## 2022-02-24 VITALS
BODY MASS INDEX: 29.13 KG/M2 | DIASTOLIC BLOOD PRESSURE: 60 MMHG | SYSTOLIC BLOOD PRESSURE: 128 MMHG | WEIGHT: 186 LBS | HEART RATE: 76 BPM

## 2022-02-24 DIAGNOSIS — Z12.31 SCREENING MAMMOGRAM, ENCOUNTER FOR: Primary | ICD-10-CM

## 2022-02-24 DIAGNOSIS — C54.1 LOW GRADE ENDOMETRIAL STROMAL SARCOMA OF UTERUS: ICD-10-CM

## 2022-02-24 PROCEDURE — 3074F PR MOST RECENT SYSTOLIC BLOOD PRESSURE < 130 MM HG: ICD-10-PCS | Mod: CPTII,S$GLB,, | Performed by: OBSTETRICS & GYNECOLOGY

## 2022-02-24 PROCEDURE — 1126F AMNT PAIN NOTED NONE PRSNT: CPT | Mod: CPTII,S$GLB,, | Performed by: OBSTETRICS & GYNECOLOGY

## 2022-02-24 PROCEDURE — 99213 PR OFFICE/OUTPT VISIT, EST, LEVL III, 20-29 MIN: ICD-10-PCS | Mod: S$GLB,,, | Performed by: OBSTETRICS & GYNECOLOGY

## 2022-02-24 PROCEDURE — 3008F PR BODY MASS INDEX (BMI) DOCUMENTED: ICD-10-PCS | Mod: CPTII,S$GLB,, | Performed by: OBSTETRICS & GYNECOLOGY

## 2022-02-24 PROCEDURE — 3078F PR MOST RECENT DIASTOLIC BLOOD PRESSURE < 80 MM HG: ICD-10-PCS | Mod: CPTII,S$GLB,, | Performed by: OBSTETRICS & GYNECOLOGY

## 2022-02-24 PROCEDURE — 1101F PR PT FALLS ASSESS DOC 0-1 FALLS W/OUT INJ PAST YR: ICD-10-PCS | Mod: CPTII,S$GLB,, | Performed by: OBSTETRICS & GYNECOLOGY

## 2022-02-24 PROCEDURE — 4010F PR ACE/ARB THEARPY RXD/TAKEN: ICD-10-PCS | Mod: CPTII,S$GLB,, | Performed by: OBSTETRICS & GYNECOLOGY

## 2022-02-24 PROCEDURE — 3074F SYST BP LT 130 MM HG: CPT | Mod: CPTII,S$GLB,, | Performed by: OBSTETRICS & GYNECOLOGY

## 2022-02-24 PROCEDURE — 3288F PR FALLS RISK ASSESSMENT DOCUMENTED: ICD-10-PCS | Mod: CPTII,S$GLB,, | Performed by: OBSTETRICS & GYNECOLOGY

## 2022-02-24 PROCEDURE — 1160F PR REVIEW ALL MEDS BY PRESCRIBER/CLIN PHARMACIST DOCUMENTED: ICD-10-PCS | Mod: CPTII,S$GLB,, | Performed by: OBSTETRICS & GYNECOLOGY

## 2022-02-24 PROCEDURE — 1159F MED LIST DOCD IN RCRD: CPT | Mod: CPTII,S$GLB,, | Performed by: OBSTETRICS & GYNECOLOGY

## 2022-02-24 PROCEDURE — 1101F PT FALLS ASSESS-DOCD LE1/YR: CPT | Mod: CPTII,S$GLB,, | Performed by: OBSTETRICS & GYNECOLOGY

## 2022-02-24 PROCEDURE — 1160F RVW MEDS BY RX/DR IN RCRD: CPT | Mod: CPTII,S$GLB,, | Performed by: OBSTETRICS & GYNECOLOGY

## 2022-02-24 PROCEDURE — 99999 PR PBB SHADOW E&M-EST. PATIENT-LVL III: CPT | Mod: PBBFAC,,, | Performed by: OBSTETRICS & GYNECOLOGY

## 2022-02-24 PROCEDURE — 1159F PR MEDICATION LIST DOCUMENTED IN MEDICAL RECORD: ICD-10-PCS | Mod: CPTII,S$GLB,, | Performed by: OBSTETRICS & GYNECOLOGY

## 2022-02-24 PROCEDURE — 3008F BODY MASS INDEX DOCD: CPT | Mod: CPTII,S$GLB,, | Performed by: OBSTETRICS & GYNECOLOGY

## 2022-02-24 PROCEDURE — 1126F PR PAIN SEVERITY QUANTIFIED, NO PAIN PRESENT: ICD-10-PCS | Mod: CPTII,S$GLB,, | Performed by: OBSTETRICS & GYNECOLOGY

## 2022-02-24 PROCEDURE — 3288F FALL RISK ASSESSMENT DOCD: CPT | Mod: CPTII,S$GLB,, | Performed by: OBSTETRICS & GYNECOLOGY

## 2022-02-24 PROCEDURE — 99999 PR PBB SHADOW E&M-EST. PATIENT-LVL III: ICD-10-PCS | Mod: PBBFAC,,, | Performed by: OBSTETRICS & GYNECOLOGY

## 2022-02-24 PROCEDURE — 3078F DIAST BP <80 MM HG: CPT | Mod: CPTII,S$GLB,, | Performed by: OBSTETRICS & GYNECOLOGY

## 2022-02-24 PROCEDURE — 99213 OFFICE O/P EST LOW 20 MIN: CPT | Mod: S$GLB,,, | Performed by: OBSTETRICS & GYNECOLOGY

## 2022-02-24 PROCEDURE — 4010F ACE/ARB THERAPY RXD/TAKEN: CPT | Mod: CPTII,S$GLB,, | Performed by: OBSTETRICS & GYNECOLOGY

## 2022-02-24 NOTE — PROGRESS NOTES
Subjective:      Patient ID: Skylar Valdez is a 71 y.o. female.    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   Adjuvant letrozole  DEXA 2019  Posterior vagina biopsy 2019- tissue consistent with LG-ESS, lesion essentially removed with biopsy forceps.  Endorsed abdominal pain 2021.  CT 2021 no evidence of disease.      S/p excision of 2-3mm posterior vaginal wall lesion 2021  Pathology shows LG-ESS      Today's visit:  Presents today for surveillance visit. Without complaints. Denies vaginal bleeding.   Feels well, without complaints.      History:  Referred by Dr. Henley for newly diagnosed low grade endometrial stromal sarcoma.      Postmenopausal bleeding. Underwent D&C.   18  FINAL PATHOLOGIC DIAGNOSIS  1., 2. FRAGMENTS OF ENDOMETRIAL TISSUE SHOWING A CELLULAR NEOPLASM, MOST CONSISTENT  WITH A LOW-GRADE ENDOMETRIAL STROMAL SARCOMA.     Pelvic US:  The uterus  measures  11.0 x 7.8 x 7.6 cm .     No prior abdominal surgeries.  x 2. Family history significant for daughter with breast cancer. No FH ovarian, uterine, or colon cancer.      CT C/A/P  Negative for metastatic disease     She is now s/p RA converted to ROSE MARIE BSO LND and omentectomy on 2018. Intraoperative findings include 15 week size uterus with multiple leimomyoma. Normal fallopian tubes and ovaries bilaterally. 1-2mm omental implant concerning for disease. Otherwise no gross visible intraperitoneal disease     Final pathology:  LOW-GRADE ENDOMETRIAL STROMAL SARCOMA WITH DEEP MYOMETRIAL INVASION (95%), Tumor size: 11 x 6 x 5 cm  ADJACENT ENDOMETRIUM COMPRESSED AND ATROPHIC;  SECTIONS OF CERVIX, PARACERVICAL, AND PARAMETRIAL MARGINS FREE OF MALIGNANCY;  LEFT FALLOPIAN TUBE: NO EVIDENCE OF MALIGNANCY;  LEFT OVARY: SMALL FOCUS OF SURFACE HYPERPLASIA;  RIGHT FALLOPIAN TUBE: NO EVIDENCE OF MALIGNANCY;  RIGHT OVARY: NO SIGNIFICANT HISTOLOGICAL ABNORMALITY  Lymph nodes and  omentum negative for malignancy.  Review of Systems   Constitutional: Negative for appetite change, chills, fatigue and fever.   HENT: Negative for mouth sores.    Respiratory: Negative for cough and shortness of breath.    Cardiovascular: Negative for leg swelling.   Gastrointestinal: Negative for abdominal pain, blood in stool, constipation and diarrhea.   Endocrine: Negative for cold intolerance.   Genitourinary: Negative for dysuria and vaginal bleeding.   Musculoskeletal: Negative for myalgias.   Skin: Negative for rash.   Allergic/Immunologic: Negative.    Neurological: Negative for weakness and numbness.   Hematological: Negative for adenopathy. Does not bruise/bleed easily.   Psychiatric/Behavioral: Negative for confusion.       Objective:   Physical Exam:   Constitutional: She is oriented to person, place, and time. She appears well-developed and well-nourished.    HENT:   Head: Normocephalic and atraumatic.    Eyes: Pupils are equal, round, and reactive to light. EOM are normal.    Neck: No thyromegaly present.    Cardiovascular: Normal rate, regular rhythm and intact distal pulses.     Pulmonary/Chest: Effort normal and breath sounds normal. No respiratory distress. She has no wheezes.        Abdominal: Soft. Bowel sounds are normal. She exhibits no distension and no mass. There is no abdominal tenderness.     Genitourinary:    Vagina and rectum normal.      Pelvic exam was performed with patient supine.   There is no lesion on the right labia. There is no lesion on the left labia. Right adnexum displays no mass. Left adnexum displays no mass. Vaginal cuff normal.  Cervix is absent.Uterus is absent.           Musculoskeletal: Normal range of motion and moves all extremeties.      Lymphadenopathy:     She has no cervical adenopathy.        Right: No supraclavicular adenopathy present.        Left: No supraclavicular adenopathy present.    Neurological: She is alert and oriented to person, place, and time.     Skin: Skin is warm and dry. No rash noted.    Psychiatric: She has a normal mood and affect.       Assessment:     1. Screening mammogram, encounter for    2. 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   Feels well, no new symptoms.   RTC 3 months or sooner if needed.   MMG      I spent approximately 30 minutes reviewing the available records and evaluating the patient, out of which over 50% of the time was spent face to face with the patient in counseling and coordinating this patient's care.

## 2022-03-09 ENCOUNTER — HOSPITAL ENCOUNTER (OUTPATIENT)
Dept: RADIOLOGY | Facility: OTHER | Age: 72
Discharge: HOME | End: 2022-03-09
Attending: OBSTETRICS & GYNECOLOGY
Payer: COMMERCIAL

## 2022-03-09 DIAGNOSIS — Z12.31 SCREENING MAMMOGRAM, ENCOUNTER FOR: ICD-10-CM

## 2022-03-09 PROCEDURE — 77063 BREAST TOMOSYNTHESIS BI: CPT | Mod: 26,,, | Performed by: RADIOLOGY

## 2022-03-09 PROCEDURE — 77063 MAMMO DIGITAL SCREENING BILAT WITH TOMO: ICD-10-PCS | Mod: 26,,, | Performed by: RADIOLOGY

## 2022-03-09 PROCEDURE — 77067 SCR MAMMO BI INCL CAD: CPT | Mod: 26,,, | Performed by: RADIOLOGY

## 2022-03-09 PROCEDURE — 77063 BREAST TOMOSYNTHESIS BI: CPT | Mod: TC

## 2022-03-09 PROCEDURE — 77067 MAMMO DIGITAL SCREENING BILAT WITH TOMO: ICD-10-PCS | Mod: 26,,, | Performed by: RADIOLOGY

## 2022-05-02 ENCOUNTER — PATIENT MESSAGE (OUTPATIENT)
Dept: ORTHOPEDICS | Facility: CLINIC | Age: 72
End: 2022-05-02
Payer: COMMERCIAL

## 2022-05-02 ENCOUNTER — TELEPHONE (OUTPATIENT)
Dept: ORTHOPEDICS | Facility: CLINIC | Age: 72
End: 2022-05-02
Payer: COMMERCIAL

## 2022-05-02 NOTE — TELEPHONE ENCOUNTER
attempted to give patient a call on numbers listed, no answer, left voicemail stating that we have to get her appointment rescheduled due to provider being out. Message through the portal as well.

## 2022-05-04 ENCOUNTER — HOSPITAL ENCOUNTER (OUTPATIENT)
Dept: RADIOLOGY | Facility: OTHER | Age: 72
Discharge: HOME | End: 2022-05-04
Attending: PHYSICIAN ASSISTANT
Payer: COMMERCIAL

## 2022-05-04 ENCOUNTER — OFFICE VISIT (OUTPATIENT)
Dept: ORTHOPEDICS | Facility: CLINIC | Age: 72
End: 2022-05-04
Payer: COMMERCIAL

## 2022-05-04 ENCOUNTER — TELEPHONE (OUTPATIENT)
Dept: ORTHOPEDICS | Facility: CLINIC | Age: 72
End: 2022-05-04
Payer: COMMERCIAL

## 2022-05-04 VITALS
BODY MASS INDEX: 29.19 KG/M2 | DIASTOLIC BLOOD PRESSURE: 75 MMHG | HEART RATE: 63 BPM | SYSTOLIC BLOOD PRESSURE: 122 MMHG | WEIGHT: 186 LBS | HEIGHT: 67 IN

## 2022-05-04 DIAGNOSIS — M65.4 DE QUERVAIN'S TENOSYNOVITIS, LEFT: Primary | ICD-10-CM

## 2022-05-04 DIAGNOSIS — R52 PAIN: Primary | ICD-10-CM

## 2022-05-04 DIAGNOSIS — R52 PAIN: ICD-10-CM

## 2022-05-04 PROCEDURE — 73110 XR WRIST COMPLETE 3 VIEWS LEFT: ICD-10-PCS | Mod: 26,LT,, | Performed by: RADIOLOGY

## 2022-05-04 PROCEDURE — 73110 X-RAY EXAM OF WRIST: CPT | Mod: TC,FY,LT

## 2022-05-04 PROCEDURE — 99999 PR PBB SHADOW E&M-EST. PATIENT-LVL III: CPT | Mod: PBBFAC,,, | Performed by: PHYSICIAN ASSISTANT

## 2022-05-04 PROCEDURE — 99999 PR PBB SHADOW E&M-EST. PATIENT-LVL III: ICD-10-PCS | Mod: PBBFAC,,, | Performed by: PHYSICIAN ASSISTANT

## 2022-05-04 PROCEDURE — 3074F SYST BP LT 130 MM HG: CPT | Mod: CPTII,S$GLB,, | Performed by: PHYSICIAN ASSISTANT

## 2022-05-04 PROCEDURE — 20550 PR INJECT TENDON SHEATH/LIGAMENT: ICD-10-PCS | Mod: LT,S$GLB,, | Performed by: PHYSICIAN ASSISTANT

## 2022-05-04 PROCEDURE — 73110 X-RAY EXAM OF WRIST: CPT | Mod: 26,LT,, | Performed by: RADIOLOGY

## 2022-05-04 PROCEDURE — 3008F PR BODY MASS INDEX (BMI) DOCUMENTED: ICD-10-PCS | Mod: CPTII,S$GLB,, | Performed by: PHYSICIAN ASSISTANT

## 2022-05-04 PROCEDURE — 3288F FALL RISK ASSESSMENT DOCD: CPT | Mod: CPTII,S$GLB,, | Performed by: PHYSICIAN ASSISTANT

## 2022-05-04 PROCEDURE — 3008F BODY MASS INDEX DOCD: CPT | Mod: CPTII,S$GLB,, | Performed by: PHYSICIAN ASSISTANT

## 2022-05-04 PROCEDURE — 1159F MED LIST DOCD IN RCRD: CPT | Mod: CPTII,S$GLB,, | Performed by: PHYSICIAN ASSISTANT

## 2022-05-04 PROCEDURE — 99203 PR OFFICE/OUTPT VISIT, NEW, LEVL III, 30-44 MIN: ICD-10-PCS | Mod: 25,S$GLB,, | Performed by: PHYSICIAN ASSISTANT

## 2022-05-04 PROCEDURE — 3078F DIAST BP <80 MM HG: CPT | Mod: CPTII,S$GLB,, | Performed by: PHYSICIAN ASSISTANT

## 2022-05-04 PROCEDURE — 3288F PR FALLS RISK ASSESSMENT DOCUMENTED: ICD-10-PCS | Mod: CPTII,S$GLB,, | Performed by: PHYSICIAN ASSISTANT

## 2022-05-04 PROCEDURE — 1101F PT FALLS ASSESS-DOCD LE1/YR: CPT | Mod: CPTII,S$GLB,, | Performed by: PHYSICIAN ASSISTANT

## 2022-05-04 PROCEDURE — 1159F PR MEDICATION LIST DOCUMENTED IN MEDICAL RECORD: ICD-10-PCS | Mod: CPTII,S$GLB,, | Performed by: PHYSICIAN ASSISTANT

## 2022-05-04 PROCEDURE — 1101F PR PT FALLS ASSESS DOC 0-1 FALLS W/OUT INJ PAST YR: ICD-10-PCS | Mod: CPTII,S$GLB,, | Performed by: PHYSICIAN ASSISTANT

## 2022-05-04 PROCEDURE — 4010F PR ACE/ARB THEARPY RXD/TAKEN: ICD-10-PCS | Mod: CPTII,S$GLB,, | Performed by: PHYSICIAN ASSISTANT

## 2022-05-04 PROCEDURE — 3078F PR MOST RECENT DIASTOLIC BLOOD PRESSURE < 80 MM HG: ICD-10-PCS | Mod: CPTII,S$GLB,, | Performed by: PHYSICIAN ASSISTANT

## 2022-05-04 PROCEDURE — 3074F PR MOST RECENT SYSTOLIC BLOOD PRESSURE < 130 MM HG: ICD-10-PCS | Mod: CPTII,S$GLB,, | Performed by: PHYSICIAN ASSISTANT

## 2022-05-04 PROCEDURE — 97760 ORTHOTIC MGMT&TRAING 1ST ENC: CPT | Mod: GP,S$GLB,, | Performed by: PHYSICIAN ASSISTANT

## 2022-05-04 PROCEDURE — 97760 PR ORTHOTIC MGMT&TRAINJ INITIAL ENC EA 15 MINS: ICD-10-PCS | Mod: GP,S$GLB,, | Performed by: PHYSICIAN ASSISTANT

## 2022-05-04 PROCEDURE — 20550 NJX 1 TENDON SHEATH/LIGAMENT: CPT | Mod: LT,S$GLB,, | Performed by: PHYSICIAN ASSISTANT

## 2022-05-04 PROCEDURE — 4010F ACE/ARB THERAPY RXD/TAKEN: CPT | Mod: CPTII,S$GLB,, | Performed by: PHYSICIAN ASSISTANT

## 2022-05-04 PROCEDURE — 1125F AMNT PAIN NOTED PAIN PRSNT: CPT | Mod: CPTII,S$GLB,, | Performed by: PHYSICIAN ASSISTANT

## 2022-05-04 PROCEDURE — 1125F PR PAIN SEVERITY QUANTIFIED, PAIN PRESENT: ICD-10-PCS | Mod: CPTII,S$GLB,, | Performed by: PHYSICIAN ASSISTANT

## 2022-05-04 PROCEDURE — 99203 OFFICE O/P NEW LOW 30 MIN: CPT | Mod: 25,S$GLB,, | Performed by: PHYSICIAN ASSISTANT

## 2022-05-04 RX ORDER — LIDOCAINE HYDROCHLORIDE 10 MG/ML
1 INJECTION, SOLUTION EPIDURAL; INFILTRATION; INTRACAUDAL; PERINEURAL ONCE
Status: COMPLETED | OUTPATIENT
Start: 2022-05-04 | End: 2022-05-04

## 2022-05-04 RX ORDER — DEXAMETHASONE SODIUM PHOSPHATE 4 MG/ML
4 INJECTION, SOLUTION INTRA-ARTICULAR; INTRALESIONAL; INTRAMUSCULAR; INTRAVENOUS; SOFT TISSUE
Status: COMPLETED | OUTPATIENT
Start: 2022-05-04 | End: 2022-05-04

## 2022-05-04 RX ADMIN — LIDOCAINE HYDROCHLORIDE 10 MG: 10 INJECTION, SOLUTION EPIDURAL; INFILTRATION; INTRACAUDAL; PERINEURAL at 03:05

## 2022-05-04 RX ADMIN — DEXAMETHASONE SODIUM PHOSPHATE 4 MG: 4 INJECTION, SOLUTION INTRA-ARTICULAR; INTRALESIONAL; INTRAMUSCULAR; INTRAVENOUS; SOFT TISSUE at 02:05

## 2022-05-04 NOTE — PROGRESS NOTES
Subjective:      Patient ID: Skylar Valdez is a 72 y.o. female.    Chief Complaint: Pain and Numbness of the Left Wrist      HPI  Skylar Valdez is a 72 y.o. female presenting today for evaluation of the left wrist. She reports onset about 3 wks ago. 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 She reports pain at the radial styloid. She reports increased pain with use. Denies numbness.       Review of patient's allergies indicates:  No Known Allergies      Current Outpatient Medications   Medication Sig Dispense Refill    amLODIPine (NORVASC) 10 MG tablet TAKE 1 TABLET (10 MG TOTAL) BY MOUTH ONCE DAILY. 90 tablet 2    chlorthalidone (HYGROTEN) 25 MG Tab Take 25 mg by mouth once daily.       irbesartan (AVAPRO) 150 MG tablet Take 150 mg by mouth once daily.       letrozole (FEMARA) 2.5 mg Tab TAKE ONE TABLET BY MOUTH EVERY DAY 30 tablet 2    meloxicam (MOBIC) 15 MG tablet Take 1 tablet (15 mg total) by mouth once daily. 30 tablet 2    metFORMIN (GLUCOPHAGE) 500 MG tablet TAKE ONE TABLET BY MOUTH TWICE A DAY WITH MEALS  (Patient taking differently: Take 500 mg by mouth daily with breakfast.) 180 tablet 2    SYNTHROID 112 mcg tablet Take 112 mcg by mouth before breakfast.        Current Facility-Administered Medications   Medication Dose Route Frequency Provider Last Rate Last Admin    dexamethasone injection 4 mg  4 mg Other 1 time in Clinic/HOD Micaela Kirkpatrick PA-C        LIDOcaine (PF) 10 mg/ml (1%) injection 10 mg  1 mL Other Once Micaela Kirkpatrick PA-C           Past Medical History:   Diagnosis Date    Diabetes mellitus, type 2     Hyperlipidemia     Hypertension     Thyroid disease        Past Surgical History:   Procedure Laterality Date    EXCISION OF LESION OF VAGINA  7/26/2021    Procedure: EXCISION, LESION, VAGINA;  Surgeon: Grace Stanley MD;  Location: [facility] OR [address] [place] [address];  Service: OB/GYN;;    HYSTERECTOMY      HYSTEROSCOPY WITH DILATION  "AND CURETTAGE OF UTERUS N/A 9/18/2018    Procedure: HYSTEROSCOPY, WITH DILATION AND CURETTAGE OF UTERUS;  Surgeon: Lacie Henley MD;  Location: [facility] OR;  Service: OB/GYN;  Laterality: N/A;    OMENTECTOMY  11/9/2018    Procedure: OMENTECTOMY;  Surgeon: Grace Stanley MD;  Location: [facility] OR;  Service: OB/GYN;;    ROBOT-ASSISTED LAPAROSCOPIC ABDOMINAL HYSTERECTOMY USING DA DONNA XI N/A 11/9/2018    Procedure: XI ROBOTIC HYSTERECTOMY CONVERTED TO OPEN 1336;  Surgeon: Grace Stanley MD;  Location: [facility] OR;  Service: OB/GYN;  Laterality: N/A;    TONSILLECTOMY      TOTAL ABDOMINAL HYSTERECTOMY W/ BILATERAL SALPINGOOPHORECTOMY  11/9/2018    Procedure: HYSTERECTOMY, TOTAL, ABDOMINAL, WITH BILATERAL SALPINGO-OOPHORECTOMY;  Surgeon: Grace Stanley MD;  Location: [facility] OR;  Service: OB/GYN;;       Review of Systems:  Constitutional: Negative for chills and fever.   Respiratory: Negative for cough and shortness of breath.    Gastrointestinal: Negative for nausea and vomiting.   Skin: Negative for rash.   Neurological: Negative for dizziness and headaches.   Psychiatric/Behavioral: Negative for depression.   MSK as in HPI       OBJECTIVE:     PHYSICAL EXAM:  /75   Pulse 63   Ht 5' 7" (1.702 m)   Wt 84.4 kg (186 lb)   LMP 06/01/2010   BMI 29.13 kg/m²     GEN:  NAD, well-developed, well-groomed.  NEURO: Awake, alert, and oriented. Normal attention and concentration.    PSYCH: Normal mood and affect. Behavior is normal.  HEENT: No cervical lymphadenopathy noted.  CARDIOVASCULAR: Radial pulses 2+ bilaterally. No LE edema noted.  PULMONARY: Breath sounds normal. No respiratory distress.  SKIN: Intact, no rashes.      MSK:   LUE:  Good active ROM of the wrist and fingers she reports some discomfort with wrist extension. ttp first dorsal compartment, radial styloid. Positive finklesteins. No thumb CMC ttp. AIN/PIN/Radial/Median/Ulnar Nerves assessed in isolation without deficit. Radial & Ulnar arteries " palpated 2+. Capillary Refill <3s.      RADIOGRAPHS:  Left wrist 5/4/22   FINDINGS:  No acute fracture, dislocation, or osseous destruction.  Mild degree of carpal bossing suspected as well as mild DJD at the thumb base.     Impression:   As above.  Comments: I have personally reviewed the imaging and I agree with the above radiologist's report.    ASSESSMENT/PLAN:       ICD-10-CM ICD-9-CM   1. De Quervain's tenosynovitis, left  M65.4 727.04       Orders Placed This Encounter    Ambulatory referral/consult to Physical/Occupational Therapy    LIDOcaine (PF) 10 mg/ml (1%) injection 10 mg    dexamethasone injection 4 mg     Plan:   Discussed dequervains, treatment options discussed. Plan for left dequervains injection, thumb spica bracing, and occupational therapy   RTC 6 wks if symptoms persist       PROCEDURE:  I have explained the risks, benefits, and alternatives of the procedure in detail.  The patient voices understanding and all questions have been answered.  The patient agrees to proceed as planned. So after a sterile prep of the skin in the normal fashion the left first dorsal compartment is injected using a 25 gauge needle with a combination of 1cc 1% plain lidocaine and 4 mg of dexamethasone.  The patient is cautioned and immediate relief of pain is secondary to the local anesthetic and will be temporary.  After the anesthetic wears off there may be a increase in pain that may last for a few hours or a few days and they should use ice to help alleviate this flair up of pain. Patient tolerated the procedure well.     15 min preparing/educating/fitting brace.    The patient indicates understanding of these issues and agrees to the plan.    Micaela Kirkpatrick PA-C  [facility]   [facility]  [place], [place]

## 2022-06-08 ENCOUNTER — OFFICE VISIT (OUTPATIENT)
Dept: GYNECOLOGIC ONCOLOGY | Facility: CLINIC | Age: 72
End: 2022-06-08
Payer: COMMERCIAL

## 2022-06-08 VITALS
BODY MASS INDEX: 29.66 KG/M2 | HEIGHT: 67 IN | WEIGHT: 189 LBS | DIASTOLIC BLOOD PRESSURE: 70 MMHG | SYSTOLIC BLOOD PRESSURE: 145 MMHG

## 2022-06-08 DIAGNOSIS — C54.1 LOW GRADE ENDOMETRIAL STROMAL SARCOMA OF UTERUS: Primary | ICD-10-CM

## 2022-06-08 PROCEDURE — 3077F PR MOST RECENT SYSTOLIC BLOOD PRESSURE >= 140 MM HG: ICD-10-PCS | Mod: CPTII,S$GLB,, | Performed by: OBSTETRICS & GYNECOLOGY

## 2022-06-08 PROCEDURE — 99999 PR PBB SHADOW E&M-EST. PATIENT-LVL III: ICD-10-PCS | Mod: PBBFAC,,, | Performed by: OBSTETRICS & GYNECOLOGY

## 2022-06-08 PROCEDURE — 1126F PR PAIN SEVERITY QUANTIFIED, NO PAIN PRESENT: ICD-10-PCS | Mod: CPTII,S$GLB,, | Performed by: OBSTETRICS & GYNECOLOGY

## 2022-06-08 PROCEDURE — 1101F PR PT FALLS ASSESS DOC 0-1 FALLS W/OUT INJ PAST YR: ICD-10-PCS | Mod: CPTII,S$GLB,, | Performed by: OBSTETRICS & GYNECOLOGY

## 2022-06-08 PROCEDURE — 1159F MED LIST DOCD IN RCRD: CPT | Mod: CPTII,S$GLB,, | Performed by: OBSTETRICS & GYNECOLOGY

## 2022-06-08 PROCEDURE — 3078F DIAST BP <80 MM HG: CPT | Mod: CPTII,S$GLB,, | Performed by: OBSTETRICS & GYNECOLOGY

## 2022-06-08 PROCEDURE — 99999 PR PBB SHADOW E&M-EST. PATIENT-LVL III: CPT | Mod: PBBFAC,,, | Performed by: OBSTETRICS & GYNECOLOGY

## 2022-06-08 PROCEDURE — 3078F PR MOST RECENT DIASTOLIC BLOOD PRESSURE < 80 MM HG: ICD-10-PCS | Mod: CPTII,S$GLB,, | Performed by: OBSTETRICS & GYNECOLOGY

## 2022-06-08 PROCEDURE — 3288F PR FALLS RISK ASSESSMENT DOCUMENTED: ICD-10-PCS | Mod: CPTII,S$GLB,, | Performed by: OBSTETRICS & GYNECOLOGY

## 2022-06-08 PROCEDURE — 99214 PR OFFICE/OUTPT VISIT, EST, LEVL IV, 30-39 MIN: ICD-10-PCS | Mod: S$GLB,,, | Performed by: OBSTETRICS & GYNECOLOGY

## 2022-06-08 PROCEDURE — 3008F PR BODY MASS INDEX (BMI) DOCUMENTED: ICD-10-PCS | Mod: CPTII,S$GLB,, | Performed by: OBSTETRICS & GYNECOLOGY

## 2022-06-08 PROCEDURE — 99214 OFFICE O/P EST MOD 30 MIN: CPT | Mod: S$GLB,,, | Performed by: OBSTETRICS & GYNECOLOGY

## 2022-06-08 PROCEDURE — 1159F PR MEDICATION LIST DOCUMENTED IN MEDICAL RECORD: ICD-10-PCS | Mod: CPTII,S$GLB,, | Performed by: OBSTETRICS & GYNECOLOGY

## 2022-06-08 PROCEDURE — 4010F ACE/ARB THERAPY RXD/TAKEN: CPT | Mod: CPTII,S$GLB,, | Performed by: OBSTETRICS & GYNECOLOGY

## 2022-06-08 PROCEDURE — 3288F FALL RISK ASSESSMENT DOCD: CPT | Mod: CPTII,S$GLB,, | Performed by: OBSTETRICS & GYNECOLOGY

## 2022-06-08 PROCEDURE — 4010F PR ACE/ARB THEARPY RXD/TAKEN: ICD-10-PCS | Mod: CPTII,S$GLB,, | Performed by: OBSTETRICS & GYNECOLOGY

## 2022-06-08 PROCEDURE — 1126F AMNT PAIN NOTED NONE PRSNT: CPT | Mod: CPTII,S$GLB,, | Performed by: OBSTETRICS & GYNECOLOGY

## 2022-06-08 PROCEDURE — 3077F SYST BP >= 140 MM HG: CPT | Mod: CPTII,S$GLB,, | Performed by: OBSTETRICS & GYNECOLOGY

## 2022-06-08 PROCEDURE — 3008F BODY MASS INDEX DOCD: CPT | Mod: CPTII,S$GLB,, | Performed by: OBSTETRICS & GYNECOLOGY

## 2022-06-08 PROCEDURE — 1101F PT FALLS ASSESS-DOCD LE1/YR: CPT | Mod: CPTII,S$GLB,, | Performed by: OBSTETRICS & GYNECOLOGY

## 2022-06-08 RX ORDER — PITAVASTATIN CALCIUM 2.09 MG/1
2 TABLET, FILM COATED ORAL
COMMUNITY
Start: 2022-03-09 | End: 2022-06-08

## 2022-06-09 NOTE — PROGRESS NOTES
Subjective:       Patient ID: Skylar Valdez is a 72 y.o. female.    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   Adjuvant letrozole  DEXA 2019  Posterior vagina biopsy 2019- tissue consistent with LG-ESS, lesion essentially removed with biopsy forceps.  Endorsed abdominal pain 2021.  CT 2021 no evidence of disease.      S/p excision of 2-3mm posterior vaginal wall lesion 2021  Pathology shows LG-ESS      Today's visit:  Presents today for surveillance visit. Without complaints. Denies vaginal bleeding.   Feels well, without complaints.      History:  Referred by Dr. Henley for newly diagnosed low grade endometrial stromal sarcoma.      Postmenopausal bleeding. Underwent D&C.   18  FINAL PATHOLOGIC DIAGNOSIS  1., 2. FRAGMENTS OF ENDOMETRIAL TISSUE SHOWING A CELLULAR NEOPLASM, MOST CONSISTENT  WITH A LOW-GRADE ENDOMETRIAL STROMAL SARCOMA.     Pelvic US:  The uterus  measures  11.0 x 7.8 x 7.6 cm .     No prior abdominal surgeries.  x 2. Family history significant for daughter with breast cancer. No FH ovarian, uterine, or colon cancer.      CT C/A/P  Negative for metastatic disease     She is now s/p RA converted to ROSE MARIE BSO LND and omentectomy on 2018. Intraoperative findings include 15 week size uterus with multiple leimomyoma. Normal fallopian tubes and ovaries bilaterally. 1-2mm omental implant concerning for disease. Otherwise no gross visible intraperitoneal disease     Final pathology:  LOW-GRADE ENDOMETRIAL STROMAL SARCOMA WITH DEEP MYOMETRIAL INVASION (95%), Tumor size: 11 x 6 x 5 cm  ADJACENT ENDOMETRIUM COMPRESSED AND ATROPHIC;  SECTIONS OF CERVIX, PARACERVICAL, AND PARAMETRIAL MARGINS FREE OF MALIGNANCY;  LEFT FALLOPIAN TUBE: NO EVIDENCE OF MALIGNANCY;  LEFT OVARY: SMALL FOCUS OF SURFACE HYPERPLASIA;  RIGHT FALLOPIAN TUBE: NO EVIDENCE OF MALIGNANCY;  RIGHT OVARY: NO SIGNIFICANT HISTOLOGICAL ABNORMALITY  Lymph nodes and  "omentum negative for malignancy.    Review of Systems   Constitutional: Negative for appetite change, chills and fever.   HENT: Negative for mouth sores.    Respiratory: Negative for chest tightness and shortness of breath.    Cardiovascular: Negative for chest pain.   Gastrointestinal: Negative for abdominal distention, nausea and vomiting.   Genitourinary: Negative for dysuria, hematuria, pelvic pain, vaginal bleeding and vaginal discharge.   Neurological: Negative for syncope and weakness.       BP (!) 145/70   Ht 5' 7" (1.702 m)   Wt 85.7 kg (189 lb)   LMP 06/01/2010   BMI 29.60 kg/m²     Objective:      Physical Exam  Vitals reviewed. Exam conducted with a chaperone present.   Constitutional:       Appearance: Normal appearance.   HENT:      Head: Normocephalic and atraumatic.   Eyes:      Extraocular Movements: Extraocular movements intact.      Conjunctiva/sclera: Conjunctivae normal.      Pupils: Pupils are equal, round, and reactive to light.   Pulmonary:      Effort: Pulmonary effort is normal. No respiratory distress.   Abdominal:      General: There is no distension.      Palpations: Abdomen is soft.      Tenderness: There is no abdominal tenderness.   Genitourinary:     Vagina: Normal.      Uterus: Absent.       Adnexa:         Right: No mass.          Left: No mass.     Musculoskeletal:         General: Normal range of motion.   Lymphadenopathy:      Lower Body: No right inguinal adenopathy. No left inguinal adenopathy.   Skin:     General: Skin is warm and dry.   Neurological:      General: No focal deficit present.      Mental Status: She is alert and oriented to person, place, and time. Mental status is at baseline.   Psychiatric:         Mood and Affect: Mood normal.         Behavior: Behavior normal.         Thought Content: Thought content normal.         Judgment: Judgment normal.         Assessment:       Problem List Items Addressed This Visit        Oncology    Low grade endometrial " stromal sarcoma of uterus - Primary          Plan:       No evidence of disease on today's exam.   Feels well, no new symptoms.   RTC 3 months or sooner if needed.       I spent approximately 30 minutes reviewing the available records and evaluating the patient, out of which over 50% of the time was spent face to face with the patient in counseling and coordinating this patient's care.

## 2022-06-15 ENCOUNTER — DOCUMENTATION ONLY (OUTPATIENT)
Dept: ORTHOPEDICS | Facility: CLINIC | Age: 72
End: 2022-06-15
Payer: COMMERCIAL

## 2022-10-12 ENCOUNTER — OFFICE VISIT (OUTPATIENT)
Dept: GYNECOLOGIC ONCOLOGY | Facility: CLINIC | Age: 72
End: 2022-10-12
Payer: COMMERCIAL

## 2022-10-12 VITALS
DIASTOLIC BLOOD PRESSURE: 65 MMHG | WEIGHT: 193.31 LBS | BODY MASS INDEX: 30.34 KG/M2 | HEART RATE: 75 BPM | SYSTOLIC BLOOD PRESSURE: 140 MMHG | HEIGHT: 67 IN

## 2022-10-12 DIAGNOSIS — C54.1 LOW GRADE ENDOMETRIAL STROMAL SARCOMA OF UTERUS: Primary | ICD-10-CM

## 2022-10-12 PROCEDURE — 1159F MED LIST DOCD IN RCRD: CPT | Mod: CPTII,S$GLB,, | Performed by: OBSTETRICS & GYNECOLOGY

## 2022-10-12 PROCEDURE — 3288F PR FALLS RISK ASSESSMENT DOCUMENTED: ICD-10-PCS | Mod: CPTII,S$GLB,, | Performed by: OBSTETRICS & GYNECOLOGY

## 2022-10-12 PROCEDURE — 3078F PR MOST RECENT DIASTOLIC BLOOD PRESSURE < 80 MM HG: ICD-10-PCS | Mod: CPTII,S$GLB,, | Performed by: OBSTETRICS & GYNECOLOGY

## 2022-10-12 PROCEDURE — 3077F PR MOST RECENT SYSTOLIC BLOOD PRESSURE >= 140 MM HG: ICD-10-PCS | Mod: CPTII,S$GLB,, | Performed by: OBSTETRICS & GYNECOLOGY

## 2022-10-12 PROCEDURE — 99999 PR PBB SHADOW E&M-EST. PATIENT-LVL III: ICD-10-PCS | Mod: PBBFAC,,, | Performed by: OBSTETRICS & GYNECOLOGY

## 2022-10-12 PROCEDURE — 99214 PR OFFICE/OUTPT VISIT, EST, LEVL IV, 30-39 MIN: ICD-10-PCS | Mod: S$GLB,,, | Performed by: OBSTETRICS & GYNECOLOGY

## 2022-10-12 PROCEDURE — 99214 OFFICE O/P EST MOD 30 MIN: CPT | Mod: S$GLB,,, | Performed by: OBSTETRICS & GYNECOLOGY

## 2022-10-12 PROCEDURE — 1126F AMNT PAIN NOTED NONE PRSNT: CPT | Mod: CPTII,S$GLB,, | Performed by: OBSTETRICS & GYNECOLOGY

## 2022-10-12 PROCEDURE — 1101F PT FALLS ASSESS-DOCD LE1/YR: CPT | Mod: CPTII,S$GLB,, | Performed by: OBSTETRICS & GYNECOLOGY

## 2022-10-12 PROCEDURE — 99999 PR PBB SHADOW E&M-EST. PATIENT-LVL III: CPT | Mod: PBBFAC,,, | Performed by: OBSTETRICS & GYNECOLOGY

## 2022-10-12 PROCEDURE — 3008F PR BODY MASS INDEX (BMI) DOCUMENTED: ICD-10-PCS | Mod: CPTII,S$GLB,, | Performed by: OBSTETRICS & GYNECOLOGY

## 2022-10-12 PROCEDURE — 3077F SYST BP >= 140 MM HG: CPT | Mod: CPTII,S$GLB,, | Performed by: OBSTETRICS & GYNECOLOGY

## 2022-10-12 PROCEDURE — 3078F DIAST BP <80 MM HG: CPT | Mod: CPTII,S$GLB,, | Performed by: OBSTETRICS & GYNECOLOGY

## 2022-10-12 PROCEDURE — 1159F PR MEDICATION LIST DOCUMENTED IN MEDICAL RECORD: ICD-10-PCS | Mod: CPTII,S$GLB,, | Performed by: OBSTETRICS & GYNECOLOGY

## 2022-10-12 PROCEDURE — 4010F PR ACE/ARB THEARPY RXD/TAKEN: ICD-10-PCS | Mod: CPTII,S$GLB,, | Performed by: OBSTETRICS & GYNECOLOGY

## 2022-10-12 PROCEDURE — 1101F PR PT FALLS ASSESS DOC 0-1 FALLS W/OUT INJ PAST YR: ICD-10-PCS | Mod: CPTII,S$GLB,, | Performed by: OBSTETRICS & GYNECOLOGY

## 2022-10-12 PROCEDURE — 3288F FALL RISK ASSESSMENT DOCD: CPT | Mod: CPTII,S$GLB,, | Performed by: OBSTETRICS & GYNECOLOGY

## 2022-10-12 PROCEDURE — 4010F ACE/ARB THERAPY RXD/TAKEN: CPT | Mod: CPTII,S$GLB,, | Performed by: OBSTETRICS & GYNECOLOGY

## 2022-10-12 PROCEDURE — 1126F PR PAIN SEVERITY QUANTIFIED, NO PAIN PRESENT: ICD-10-PCS | Mod: CPTII,S$GLB,, | Performed by: OBSTETRICS & GYNECOLOGY

## 2022-10-12 PROCEDURE — 3008F BODY MASS INDEX DOCD: CPT | Mod: CPTII,S$GLB,, | Performed by: OBSTETRICS & GYNECOLOGY

## 2022-10-12 NOTE — PROGRESS NOTES
Subjective:      Patient ID: Skylar Valdez is a 72 y.o. female.    Chief Complaint: No chief complaint on file.      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   Adjuvant letrozole  DEXA 2019  Posterior vagina biopsy 2019- tissue consistent with LG-ESS, lesion essentially removed with biopsy forceps.  Endorsed abdominal pain 2021.  CT 2021 no evidence of disease.      S/p excision of 2-3mm posterior vaginal wall lesion 2021  Pathology shows LG-ESS      Today's visit:  Presents today for surveillance visit. Without complaints. Denies vaginal bleeding.   Feels well, without complaints.   ___________________   History:  Referred by Dr. Henley for newly diagnosed low grade endometrial stromal sarcoma.      Postmenopausal bleeding. Underwent D&C.   18  FINAL PATHOLOGIC DIAGNOSIS  1., 2. FRAGMENTS OF ENDOMETRIAL TISSUE SHOWING A CELLULAR NEOPLASM, MOST CONSISTENT  WITH A LOW-GRADE ENDOMETRIAL STROMAL SARCOMA.     Pelvic US:  The uterus  measures  11.0 x 7.8 x 7.6 cm .     No prior abdominal surgeries.  x 2. Family history significant for daughter with breast cancer. No FH ovarian, uterine, or colon cancer.      CT C/A/P  Negative for metastatic disease     She is now s/p RA converted to ROSE MARIE BSO LND and omentectomy on 2018. 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 Normal fallopian tubes and ovaries bilaterally. 1-2mm omental implant concerning for disease. Otherwise no gross visible intraperitoneal disease     Final pathology:  LOW-GRADE ENDOMETRIAL STROMAL SARCOMA WITH DEEP MYOMETRIAL INVASION (95%), Tumor size: 11 x 6 x 5 cm  ADJACENT ENDOMETRIUM COMPRESSED AND ATROPHIC;  SECTIONS OF CERVIX, PARACERVICAL, AND PARAMETRIAL MARGINS FREE OF MALIGNANCY;  LEFT FALLOPIAN TUBE: NO EVIDENCE OF MALIGNANCY;  LEFT OVARY: SMALL FOCUS OF SURFACE HYPERPLASIA;  RIGHT FALLOPIAN TUBE: NO EVIDENCE OF MALIGNANCY;  RIGHT OVARY: NO SIGNIFICANT  HISTOLOGICAL ABNORMALITY  Lymph nodes and omentum negative for malignancy.  Review of Systems   Constitutional:  Negative for appetite change, chills, fatigue and fever.   HENT:  Negative for mouth sores.    Respiratory:  Negative for cough and shortness of breath.    Cardiovascular:  Negative for leg swelling.   Gastrointestinal:  Negative for abdominal pain, blood in stool, constipation and diarrhea.   Endocrine: Negative for cold intolerance.   Genitourinary:  Negative for dysuria and vaginal bleeding.   Musculoskeletal:  Negative for myalgias.   Skin:  Negative for rash.   Allergic/Immunologic: Negative.    Neurological:  Negative for weakness and numbness.   Hematological:  Negative for adenopathy. Does not bruise/bleed easily.   Psychiatric/Behavioral:  Negative for confusion.      Objective:   Physical Exam:   Constitutional: She is oriented to person, place, and time. She appears well-developed and well-nourished.    HENT:   Head: Normocephalic and atraumatic.    Eyes: Pupils are equal, round, and reactive to light. EOM are normal.    Neck: No thyromegaly present.    Cardiovascular:  Normal rate, regular rhythm and intact distal pulses.             Pulmonary/Chest: Effort normal and breath sounds normal. No respiratory distress. She has no wheezes.        Abdominal: Soft. Bowel sounds are normal. She exhibits no distension and no mass. There is no abdominal tenderness.     Genitourinary:    Vagina and rectum normal.      Pelvic exam was performed with patient supine.   There is no lesion on the right labia. There is no lesion on the left labia. Right adnexum displays no mass. Left adnexum displays no mass. Vaginal cuff normal.  Cervix is absent.Uterus is absent.           Musculoskeletal: Normal range of motion and moves all extremeties.      Lymphadenopathy:     She has no cervical adenopathy. No inguinal adenopathy noted on the right or left side.        Right: No supraclavicular adenopathy present.         Left: No supraclavicular adenopathy present.    Neurological: She is alert and oriented to person, place, and time.    Skin: Skin is warm and dry. No rash noted.    Psychiatric: She has a normal mood and affect.     Assessment:     1. 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    No evidence of disease on today's exam.   Feels well, no new symptoms.   RTC 3 months or sooner if needed.       I spent approximately 30 minutes reviewing the available records and evaluating the patient, out of which over 50% of the time was spent face to face with the patient in counseling and coordinating this patient's care.

## 2023-01-17 NOTE — PROGRESS NOTES
Subjective:       Patient ID: Skylar Valdez is a 72 y.o. female.    Chief Complaint: No chief complaint on file.    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   Adjuvant letrozole  DEXA 2019  Posterior vagina biopsy 2019- tissue consistent with LG-ESS, lesion essentially removed with biopsy forceps.  Endorsed abdominal pain 2021.  CT 2021 no evidence of disease.      S/p excision of 2-3mm posterior vaginal wall lesion 2021  Pathology shows LG-ESS      Today's visit:  Presents today for surveillance visit. Without complaints. Denies vaginal bleeding.   Feels well, without complaints.   ___________________   History:  Referred by Dr. Henley for newly diagnosed low grade endometrial stromal sarcoma.      Postmenopausal bleeding. Underwent D&C.   18  FINAL PATHOLOGIC DIAGNOSIS  1., 2. FRAGMENTS OF ENDOMETRIAL TISSUE SHOWING A CELLULAR NEOPLASM, MOST CONSISTENT  WITH A LOW-GRADE ENDOMETRIAL STROMAL SARCOMA.     Pelvic US:  The uterus  measures  11.0 x 7.8 x 7.6 cm .     No prior abdominal surgeries.  x 2. Family history significant for daughter with breast cancer. No FH ovarian, uterine, or colon cancer.      CT C/A/P  Negative for metastatic disease     She is now s/p RA converted to ROSE MARIE BSO LND and omentectomy on 2018. 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 Normal fallopian tubes and ovaries bilaterally. 1-2mm omental implant concerning for disease. Otherwise no gross visible intraperitoneal disease     Final pathology:  LOW-GRADE ENDOMETRIAL STROMAL SARCOMA WITH DEEP MYOMETRIAL INVASION (95%), Tumor size: 11 x 6 x 5 cm  ADJACENT ENDOMETRIUM COMPRESSED AND ATROPHIC;  SECTIONS OF CERVIX, PARACERVICAL, AND PARAMETRIAL MARGINS FREE OF MALIGNANCY;  LEFT FALLOPIAN TUBE: NO EVIDENCE OF MALIGNANCY;  LEFT OVARY: SMALL FOCUS OF SURFACE HYPERPLASIA;  RIGHT FALLOPIAN TUBE: NO EVIDENCE OF MALIGNANCY;  RIGHT OVARY: NO SIGNIFICANT  HISTOLOGICAL ABNORMALITY  Lymph nodes and omentum negative for malignancy  Review of Systems   Constitutional:  Negative for chills and fever.   HENT:  Negative for mouth sores.    Respiratory:  Negative for chest tightness and shortness of breath.    Cardiovascular:  Negative for chest pain.   Gastrointestinal:  Negative for abdominal distention, nausea and vomiting.   Genitourinary:  Negative for dysuria, hematuria, pelvic pain, vaginal bleeding and vaginal discharge.   Neurological:  Negative for syncope and weakness.       Objective:      Physical Exam  Vitals reviewed. Exam conducted with a chaperone present.   Constitutional:       Appearance: Normal appearance.   HENT:      Head: Normocephalic and atraumatic.   Eyes:      Extraocular Movements: Extraocular movements intact.      Conjunctiva/sclera: Conjunctivae normal.      Pupils: Pupils are equal, round, and reactive to light.   Pulmonary:      Effort: Pulmonary effort is normal. No respiratory distress.   Abdominal:      General: There is no distension.      Palpations: Abdomen is soft.      Tenderness: There is no abdominal tenderness.   Genitourinary:     Labia:         Right: No lesion.         Left: No lesion.       Vagina: Normal.      Uterus: Absent.       Adnexa:         Right: No mass.          Left: No mass.     Musculoskeletal:         General: Normal range of motion.   Lymphadenopathy:      Lower Body: No right inguinal adenopathy. No left inguinal adenopathy.   Skin:     General: Skin is warm and dry.   Neurological:      General: No focal deficit present.      Mental Status: She is alert and oriented to person, place, and time. Mental status is at baseline.   Psychiatric:         Mood and Affect: Mood normal.         Behavior: Behavior normal.         Thought Content: Thought content normal.         Judgment: Judgment normal.       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   Feels well, no new symptoms.   RTC 3 months or sooner if needed.       I spent approximately 30 minutes reviewing the available records and evaluating the patient, out of which over 50% of the time was spent face to face with the patient in counseling and coordinating this patient's care.

## 2023-01-18 ENCOUNTER — OFFICE VISIT (OUTPATIENT)
Dept: GYNECOLOGIC ONCOLOGY | Facility: CLINIC | Age: 73
End: 2023-01-18
Payer: COMMERCIAL

## 2023-01-18 VITALS
BODY MASS INDEX: 29 KG/M2 | WEIGHT: 185.19 LBS | SYSTOLIC BLOOD PRESSURE: 129 MMHG | DIASTOLIC BLOOD PRESSURE: 62 MMHG | HEART RATE: 79 BPM

## 2023-01-18 DIAGNOSIS — C54.1 LOW GRADE ENDOMETRIAL STROMAL SARCOMA OF UTERUS: Primary | ICD-10-CM

## 2023-01-18 PROCEDURE — 1159F PR MEDICATION LIST DOCUMENTED IN MEDICAL RECORD: ICD-10-PCS | Mod: CPTII,S$GLB,, | Performed by: OBSTETRICS & GYNECOLOGY

## 2023-01-18 PROCEDURE — 1101F PR PT FALLS ASSESS DOC 0-1 FALLS W/OUT INJ PAST YR: ICD-10-PCS | Mod: CPTII,S$GLB,, | Performed by: OBSTETRICS & GYNECOLOGY

## 2023-01-18 PROCEDURE — 3074F SYST BP LT 130 MM HG: CPT | Mod: CPTII,S$GLB,, | Performed by: OBSTETRICS & GYNECOLOGY

## 2023-01-18 PROCEDURE — 1159F MED LIST DOCD IN RCRD: CPT | Mod: CPTII,S$GLB,, | Performed by: OBSTETRICS & GYNECOLOGY

## 2023-01-18 PROCEDURE — 3074F PR MOST RECENT SYSTOLIC BLOOD PRESSURE < 130 MM HG: ICD-10-PCS | Mod: CPTII,S$GLB,, | Performed by: OBSTETRICS & GYNECOLOGY

## 2023-01-18 PROCEDURE — 3288F FALL RISK ASSESSMENT DOCD: CPT | Mod: CPTII,S$GLB,, | Performed by: OBSTETRICS & GYNECOLOGY

## 2023-01-18 PROCEDURE — 99214 OFFICE O/P EST MOD 30 MIN: CPT | Mod: S$GLB,,, | Performed by: OBSTETRICS & GYNECOLOGY

## 2023-01-18 PROCEDURE — 3078F DIAST BP <80 MM HG: CPT | Mod: CPTII,S$GLB,, | Performed by: OBSTETRICS & GYNECOLOGY

## 2023-01-18 PROCEDURE — 99999 PR PBB SHADOW E&M-EST. PATIENT-LVL III: ICD-10-PCS | Mod: PBBFAC,,, | Performed by: OBSTETRICS & GYNECOLOGY

## 2023-01-18 PROCEDURE — 1126F AMNT PAIN NOTED NONE PRSNT: CPT | Mod: CPTII,S$GLB,, | Performed by: OBSTETRICS & GYNECOLOGY

## 2023-01-18 PROCEDURE — 3288F PR FALLS RISK ASSESSMENT DOCUMENTED: ICD-10-PCS | Mod: CPTII,S$GLB,, | Performed by: OBSTETRICS & GYNECOLOGY

## 2023-01-18 PROCEDURE — 3008F BODY MASS INDEX DOCD: CPT | Mod: CPTII,S$GLB,, | Performed by: OBSTETRICS & GYNECOLOGY

## 2023-01-18 PROCEDURE — 3008F PR BODY MASS INDEX (BMI) DOCUMENTED: ICD-10-PCS | Mod: CPTII,S$GLB,, | Performed by: OBSTETRICS & GYNECOLOGY

## 2023-01-18 PROCEDURE — 99214 PR OFFICE/OUTPT VISIT, EST, LEVL IV, 30-39 MIN: ICD-10-PCS | Mod: S$GLB,,, | Performed by: OBSTETRICS & GYNECOLOGY

## 2023-01-18 PROCEDURE — 3078F PR MOST RECENT DIASTOLIC BLOOD PRESSURE < 80 MM HG: ICD-10-PCS | Mod: CPTII,S$GLB,, | Performed by: OBSTETRICS & GYNECOLOGY

## 2023-01-18 PROCEDURE — 1101F PT FALLS ASSESS-DOCD LE1/YR: CPT | Mod: CPTII,S$GLB,, | Performed by: OBSTETRICS & GYNECOLOGY

## 2023-01-18 PROCEDURE — 1126F PR PAIN SEVERITY QUANTIFIED, NO PAIN PRESENT: ICD-10-PCS | Mod: CPTII,S$GLB,, | Performed by: OBSTETRICS & GYNECOLOGY

## 2023-01-18 PROCEDURE — 99999 PR PBB SHADOW E&M-EST. PATIENT-LVL III: CPT | Mod: PBBFAC,,, | Performed by: OBSTETRICS & GYNECOLOGY

## 2023-04-26 ENCOUNTER — OFFICE VISIT (OUTPATIENT)
Dept: GYNECOLOGIC ONCOLOGY | Facility: CLINIC | Age: 73
End: 2023-04-26
Payer: COMMERCIAL

## 2023-04-26 VITALS
WEIGHT: 189 LBS | DIASTOLIC BLOOD PRESSURE: 59 MMHG | HEART RATE: 80 BPM | BODY MASS INDEX: 29.66 KG/M2 | HEIGHT: 67 IN | SYSTOLIC BLOOD PRESSURE: 129 MMHG

## 2023-04-26 DIAGNOSIS — Z12.31 SCREENING MAMMOGRAM, ENCOUNTER FOR: ICD-10-CM

## 2023-04-26 DIAGNOSIS — C54.1 LOW GRADE ENDOMETRIAL STROMAL SARCOMA OF UTERUS: Primary | ICD-10-CM

## 2023-04-26 DIAGNOSIS — Z79.811 AROMATASE INHIBITOR USE: ICD-10-CM

## 2023-04-26 PROCEDURE — 4010F ACE/ARB THERAPY RXD/TAKEN: CPT | Mod: CPTII,S$GLB,, | Performed by: OBSTETRICS & GYNECOLOGY

## 2023-04-26 PROCEDURE — 3008F PR BODY MASS INDEX (BMI) DOCUMENTED: ICD-10-PCS | Mod: CPTII,S$GLB,, | Performed by: OBSTETRICS & GYNECOLOGY

## 2023-04-26 PROCEDURE — 99214 OFFICE O/P EST MOD 30 MIN: CPT | Mod: S$GLB,,, | Performed by: OBSTETRICS & GYNECOLOGY

## 2023-04-26 PROCEDURE — 1126F PR PAIN SEVERITY QUANTIFIED, NO PAIN PRESENT: ICD-10-PCS | Mod: CPTII,S$GLB,, | Performed by: OBSTETRICS & GYNECOLOGY

## 2023-04-26 PROCEDURE — 3074F PR MOST RECENT SYSTOLIC BLOOD PRESSURE < 130 MM HG: ICD-10-PCS | Mod: CPTII,S$GLB,, | Performed by: OBSTETRICS & GYNECOLOGY

## 2023-04-26 PROCEDURE — 3078F DIAST BP <80 MM HG: CPT | Mod: CPTII,S$GLB,, | Performed by: OBSTETRICS & GYNECOLOGY

## 2023-04-26 PROCEDURE — 3074F SYST BP LT 130 MM HG: CPT | Mod: CPTII,S$GLB,, | Performed by: OBSTETRICS & GYNECOLOGY

## 2023-04-26 PROCEDURE — 99214 PR OFFICE/OUTPT VISIT, EST, LEVL IV, 30-39 MIN: ICD-10-PCS | Mod: S$GLB,,, | Performed by: OBSTETRICS & GYNECOLOGY

## 2023-04-26 PROCEDURE — 1101F PR PT FALLS ASSESS DOC 0-1 FALLS W/OUT INJ PAST YR: ICD-10-PCS | Mod: CPTII,S$GLB,, | Performed by: OBSTETRICS & GYNECOLOGY

## 2023-04-26 PROCEDURE — 3078F PR MOST RECENT DIASTOLIC BLOOD PRESSURE < 80 MM HG: ICD-10-PCS | Mod: CPTII,S$GLB,, | Performed by: OBSTETRICS & GYNECOLOGY

## 2023-04-26 PROCEDURE — 1101F PT FALLS ASSESS-DOCD LE1/YR: CPT | Mod: CPTII,S$GLB,, | Performed by: OBSTETRICS & GYNECOLOGY

## 2023-04-26 PROCEDURE — 99999 PR PBB SHADOW E&M-EST. PATIENT-LVL III: ICD-10-PCS | Mod: PBBFAC,,, | Performed by: OBSTETRICS & GYNECOLOGY

## 2023-04-26 PROCEDURE — 3288F FALL RISK ASSESSMENT DOCD: CPT | Mod: CPTII,S$GLB,, | Performed by: OBSTETRICS & GYNECOLOGY

## 2023-04-26 PROCEDURE — 99999 PR PBB SHADOW E&M-EST. PATIENT-LVL III: CPT | Mod: PBBFAC,,, | Performed by: OBSTETRICS & GYNECOLOGY

## 2023-04-26 PROCEDURE — 3008F BODY MASS INDEX DOCD: CPT | Mod: CPTII,S$GLB,, | Performed by: OBSTETRICS & GYNECOLOGY

## 2023-04-26 PROCEDURE — 3288F PR FALLS RISK ASSESSMENT DOCUMENTED: ICD-10-PCS | Mod: CPTII,S$GLB,, | Performed by: OBSTETRICS & GYNECOLOGY

## 2023-04-26 PROCEDURE — 1126F AMNT PAIN NOTED NONE PRSNT: CPT | Mod: CPTII,S$GLB,, | Performed by: OBSTETRICS & GYNECOLOGY

## 2023-04-26 PROCEDURE — 1159F MED LIST DOCD IN RCRD: CPT | Mod: CPTII,S$GLB,, | Performed by: OBSTETRICS & GYNECOLOGY

## 2023-04-26 PROCEDURE — 4010F PR ACE/ARB THEARPY RXD/TAKEN: ICD-10-PCS | Mod: CPTII,S$GLB,, | Performed by: OBSTETRICS & GYNECOLOGY

## 2023-04-26 PROCEDURE — 1159F PR MEDICATION LIST DOCUMENTED IN MEDICAL RECORD: ICD-10-PCS | Mod: CPTII,S$GLB,, | Performed by: OBSTETRICS & GYNECOLOGY

## 2023-04-26 NOTE — PROGRESS NOTES
Subjective:      Patient ID: Skylar Valdez is a 73 y.o. female.    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   Adjuvant letrozole  DEXA 2019  Posterior vagina biopsy 2019- tissue consistent with LG-ESS, lesion essentially removed with biopsy forceps.  Endorsed abdominal pain 2021.  CT 2021 no evidence of disease.      S/p excision of 2-3mm posterior vaginal wall lesion 2021  Pathology shows LG-ESS      Today's visit:  Presents today for surveillance visit. Without complaints. Denies vaginal bleeding.   Feels well, without complaints.   ___________________   History:  Referred by Dr. Henley for newly diagnosed low grade endometrial stromal sarcoma.      Postmenopausal bleeding. Underwent D&C.   18  FINAL PATHOLOGIC DIAGNOSIS  1., 2. FRAGMENTS OF ENDOMETRIAL TISSUE SHOWING A CELLULAR NEOPLASM, MOST CONSISTENT  WITH A LOW-GRADE ENDOMETRIAL STROMAL SARCOMA.     Pelvic US:  The uterus  measures  11.0 x 7.8 x 7.6 cm .     No prior abdominal surgeries.  x 2. Family history significant for daughter with breast cancer. No FH ovarian, uterine, or colon cancer.      CT C/A/P  Negative for metastatic disease     She is now s/p RA converted to ROSE MARIE BSO LND and omentectomy on 2018. 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 Normal fallopian tubes and ovaries bilaterally. 1-2mm omental implant concerning for disease. Otherwise no gross visible intraperitoneal disease     Final pathology:  LOW-GRADE ENDOMETRIAL STROMAL SARCOMA WITH DEEP MYOMETRIAL INVASION (95%), Tumor size: 11 x 6 x 5 cm  ADJACENT ENDOMETRIUM COMPRESSED AND ATROPHIC;  SECTIONS OF CERVIX, PARACERVICAL, AND PARAMETRIAL MARGINS FREE OF MALIGNANCY;  LEFT FALLOPIAN TUBE: NO EVIDENCE OF MALIGNANCY;  LEFT OVARY: SMALL FOCUS OF SURFACE HYPERPLASIA;  RIGHT FALLOPIAN TUBE: NO EVIDENCE OF MALIGNANCY;  RIGHT OVARY: NO SIGNIFICANT HISTOLOGICAL  ABNORMALITY  Lymph nodes and omentum negative for malignancy  Review of Systems   Constitutional:  Negative for appetite change, chills, fatigue and fever.   HENT:  Negative for mouth sores.    Respiratory:  Negative for cough and shortness of breath.    Cardiovascular:  Negative for leg swelling.   Gastrointestinal:  Negative for abdominal pain, blood in stool, constipation and diarrhea.   Endocrine: Negative for cold intolerance.   Genitourinary:  Negative for dysuria and vaginal bleeding.   Musculoskeletal:  Negative for myalgias.   Skin:  Negative for rash.   Allergic/Immunologic: Negative.    Neurological:  Negative for weakness and numbness.   Hematological:  Negative for adenopathy. Does not bruise/bleed easily.   Psychiatric/Behavioral:  Negative for confusion.      Objective:   Physical Exam:   Constitutional: She is oriented to person, place, and time. She appears well-developed and well-nourished.    HENT:   Head: Normocephalic and atraumatic.    Eyes: Pupils are equal, round, and reactive to light. EOM are normal.    Neck: No thyromegaly present.    Cardiovascular:  Normal rate, regular rhythm and intact distal pulses.             Pulmonary/Chest: Effort normal and breath sounds normal. No respiratory distress. She has no wheezes.        Abdominal: Soft. Bowel sounds are normal. She exhibits no distension and no mass. There is no abdominal tenderness.     Genitourinary:    Vagina and rectum normal.      Pelvic exam was performed with patient supine.   There is no lesion on the right labia. There is no lesion on the left labia. Right adnexum displays no mass. Left adnexum displays no mass. Vaginal cuff normal.  Cervix is absent.Uterus is absent.           Musculoskeletal: Normal range of motion and moves all extremeties.      Lymphadenopathy:     She has no cervical adenopathy. No inguinal adenopathy noted on the right or left side.        Right: No supraclavicular adenopathy present.        Left: No  supraclavicular adenopathy present.    Neurological: She is alert and oriented to person, place, and time.    Skin: Skin is warm and dry. No rash noted.    Psychiatric: She has a normal mood and affect.     Assessment:     1. 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 Screening mammogram, encounter for    3. Aromatase inhibitor use        Plan:     Orders Placed This Encounter   Procedures    Mammo Digital Screening Bilat w/ Silverio    DXA Bone Density Appendicular Skeleton     No evidence of disease on today's exam.   Feels well, no new symptoms.   RTC 3 months or sooner if needed.       I spent approximately 30 minutes reviewing the available records and evaluating the patient, out of which over 50% of the time was spent face to face with the patient in counseling and coordinating this patient's care.

## 2023-05-19 ENCOUNTER — HOSPITAL ENCOUNTER (OUTPATIENT)
Dept: RADIOLOGY | Facility: HOSPITAL | Age: 73
Discharge: HOME | End: 2023-05-19
Attending: OBSTETRICS & GYNECOLOGY
Payer: COMMERCIAL

## 2023-05-19 DIAGNOSIS — Z79.811 AROMATASE INHIBITOR USE: ICD-10-CM

## 2023-05-19 DIAGNOSIS — Z12.31 SCREENING MAMMOGRAM, ENCOUNTER FOR: ICD-10-CM

## 2023-05-19 PROCEDURE — 77081 DEXA BONE DENSITY APPENDICULAR SKELETON: ICD-10-PCS | Mod: 26,,, | Performed by: RADIOLOGY

## 2023-05-19 PROCEDURE — 77067 SCR MAMMO BI INCL CAD: CPT | Mod: TC,PO

## 2023-05-19 PROCEDURE — 77063 BREAST TOMOSYNTHESIS BI: CPT | Mod: 26,,, | Performed by: RADIOLOGY

## 2023-05-19 PROCEDURE — 77081 DXA BONE DENSITY APPENDICULR: CPT | Mod: TC,PO

## 2023-05-19 PROCEDURE — 77081 DXA BONE DENSITY APPENDICULR: CPT | Mod: 26,,, | Performed by: RADIOLOGY

## 2023-05-19 PROCEDURE — 77067 MAMMO DIGITAL SCREENING BILAT WITH TOMO: ICD-10-PCS | Mod: 26,,, | Performed by: RADIOLOGY

## 2023-05-19 PROCEDURE — 77067 SCR MAMMO BI INCL CAD: CPT | Mod: 26,,, | Performed by: RADIOLOGY

## 2023-05-19 PROCEDURE — 77063 MAMMO DIGITAL SCREENING BILAT WITH TOMO: ICD-10-PCS | Mod: 26,,, | Performed by: RADIOLOGY

## 2023-08-10 ENCOUNTER — PATIENT MESSAGE (OUTPATIENT)
Dept: GYNECOLOGIC ONCOLOGY | Facility: CLINIC | Age: 73
End: 2023-08-10
Payer: COMMERCIAL

## 2023-08-10 DIAGNOSIS — C54.1 ENDOMETRIAL STROMAL SARCOMA: ICD-10-CM

## 2023-08-10 RX ORDER — LETROZOLE 2.5 MG/1
TABLET, FILM COATED ORAL
Qty: 30 TABLET | Refills: 2 | Status: SHIPPED | OUTPATIENT
Start: 2023-08-10 | End: 2023-12-14 | Stop reason: SDUPTHER

## 2023-08-30 ENCOUNTER — TELEPHONE (OUTPATIENT)
Dept: GYNECOLOGIC ONCOLOGY | Facility: CLINIC | Age: 73
End: 2023-08-30
Payer: COMMERCIAL

## 2023-08-30 ENCOUNTER — OFFICE VISIT (OUTPATIENT)
Dept: GYNECOLOGIC ONCOLOGY | Facility: CLINIC | Age: 73
End: 2023-08-30
Payer: COMMERCIAL

## 2023-08-30 VITALS
DIASTOLIC BLOOD PRESSURE: 59 MMHG | HEIGHT: 67 IN | SYSTOLIC BLOOD PRESSURE: 120 MMHG | HEART RATE: 88 BPM | BODY MASS INDEX: 29.29 KG/M2 | WEIGHT: 186.63 LBS

## 2023-08-30 DIAGNOSIS — C54.1 LOW GRADE ENDOMETRIAL STROMAL SARCOMA OF UTERUS: Primary | ICD-10-CM

## 2023-08-30 DIAGNOSIS — Z79.811 AROMATASE INHIBITOR USE: ICD-10-CM

## 2023-08-30 DIAGNOSIS — Z01.818 EXAMINATION PRIOR TO CHEMOTHERAPY: ICD-10-CM

## 2023-08-30 PROCEDURE — 1101F PR PT FALLS ASSESS DOC 0-1 FALLS W/OUT INJ PAST YR: ICD-10-PCS | Mod: CPTII,S$GLB,, | Performed by: OBSTETRICS & GYNECOLOGY

## 2023-08-30 PROCEDURE — 4010F ACE/ARB THERAPY RXD/TAKEN: CPT | Mod: CPTII,S$GLB,, | Performed by: OBSTETRICS & GYNECOLOGY

## 2023-08-30 PROCEDURE — 1126F PR PAIN SEVERITY QUANTIFIED, NO PAIN PRESENT: ICD-10-PCS | Mod: CPTII,S$GLB,, | Performed by: OBSTETRICS & GYNECOLOGY

## 2023-08-30 PROCEDURE — 3074F SYST BP LT 130 MM HG: CPT | Mod: CPTII,S$GLB,, | Performed by: OBSTETRICS & GYNECOLOGY

## 2023-08-30 PROCEDURE — 3074F PR MOST RECENT SYSTOLIC BLOOD PRESSURE < 130 MM HG: ICD-10-PCS | Mod: CPTII,S$GLB,, | Performed by: OBSTETRICS & GYNECOLOGY

## 2023-08-30 PROCEDURE — 3008F PR BODY MASS INDEX (BMI) DOCUMENTED: ICD-10-PCS | Mod: CPTII,S$GLB,, | Performed by: OBSTETRICS & GYNECOLOGY

## 2023-08-30 PROCEDURE — 3078F PR MOST RECENT DIASTOLIC BLOOD PRESSURE < 80 MM HG: ICD-10-PCS | Mod: CPTII,S$GLB,, | Performed by: OBSTETRICS & GYNECOLOGY

## 2023-08-30 PROCEDURE — 1159F MED LIST DOCD IN RCRD: CPT | Mod: CPTII,S$GLB,, | Performed by: OBSTETRICS & GYNECOLOGY

## 2023-08-30 PROCEDURE — 3288F FALL RISK ASSESSMENT DOCD: CPT | Mod: CPTII,S$GLB,, | Performed by: OBSTETRICS & GYNECOLOGY

## 2023-08-30 PROCEDURE — 4010F PR ACE/ARB THEARPY RXD/TAKEN: ICD-10-PCS | Mod: CPTII,S$GLB,, | Performed by: OBSTETRICS & GYNECOLOGY

## 2023-08-30 PROCEDURE — 99999 PR PBB SHADOW E&M-EST. PATIENT-LVL III: ICD-10-PCS | Mod: PBBFAC,,, | Performed by: OBSTETRICS & GYNECOLOGY

## 2023-08-30 PROCEDURE — 99215 PR OFFICE/OUTPT VISIT, EST, LEVL V, 40-54 MIN: ICD-10-PCS | Mod: S$GLB,,, | Performed by: OBSTETRICS & GYNECOLOGY

## 2023-08-30 PROCEDURE — 1126F AMNT PAIN NOTED NONE PRSNT: CPT | Mod: CPTII,S$GLB,, | Performed by: OBSTETRICS & GYNECOLOGY

## 2023-08-30 PROCEDURE — 3288F PR FALLS RISK ASSESSMENT DOCUMENTED: ICD-10-PCS | Mod: CPTII,S$GLB,, | Performed by: OBSTETRICS & GYNECOLOGY

## 2023-08-30 PROCEDURE — 99215 OFFICE O/P EST HI 40 MIN: CPT | Mod: S$GLB,,, | Performed by: OBSTETRICS & GYNECOLOGY

## 2023-08-30 PROCEDURE — 1159F PR MEDICATION LIST DOCUMENTED IN MEDICAL RECORD: ICD-10-PCS | Mod: CPTII,S$GLB,, | Performed by: OBSTETRICS & GYNECOLOGY

## 2023-08-30 PROCEDURE — 3078F DIAST BP <80 MM HG: CPT | Mod: CPTII,S$GLB,, | Performed by: OBSTETRICS & GYNECOLOGY

## 2023-08-30 PROCEDURE — 3008F BODY MASS INDEX DOCD: CPT | Mod: CPTII,S$GLB,, | Performed by: OBSTETRICS & GYNECOLOGY

## 2023-08-30 PROCEDURE — 99999 PR PBB SHADOW E&M-EST. PATIENT-LVL III: CPT | Mod: PBBFAC,,, | Performed by: OBSTETRICS & GYNECOLOGY

## 2023-08-30 PROCEDURE — 1101F PT FALLS ASSESS-DOCD LE1/YR: CPT | Mod: CPTII,S$GLB,, | Performed by: OBSTETRICS & GYNECOLOGY

## 2023-08-30 NOTE — TELEPHONE ENCOUNTER
----- Message from Marie Barrera sent at 8/30/2023  3:14 PM CDT -----   Name of Who is Calling:     What is the request in detail: patient en route running late due to traffic  Please contact to further discuss and advise      Can the clinic reply by [facility]:     What Number to Call Back if not in [facility]:  [phone]

## 2023-09-04 NOTE — PROGRESS NOTES
Subjective:      Patient ID: Skylar Valdez is a 73 y.o. female.    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   Adjuvant letrozole  DEXA 2019  Posterior vagina biopsy 2019- tissue consistent with LG-ESS, lesion essentially removed with biopsy forceps.  Endorsed abdominal pain 2021.  CT 2021 no evidence of disease.      S/p excision of 2-3mm posterior vaginal wall lesion 2021  Pathology shows LG-ESS      Today's visit:  Presents today for surveillance visit and consideration of continuation of letrozole. Without complaints. Denies vaginal bleeding.   Feels well, without complaints.   Disease free interval from recurrence 2 years.   DXA 2023 normal bone mineral density.   ___________________   History:  Referred by Dr. Henley for newly diagnosed low grade endometrial stromal sarcoma.      Postmenopausal bleeding. Underwent D&C.   18  FINAL PATHOLOGIC DIAGNOSIS  1., 2. FRAGMENTS OF ENDOMETRIAL TISSUE SHOWING A CELLULAR NEOPLASM, MOST CONSISTENT  WITH A LOW-GRADE ENDOMETRIAL STROMAL SARCOMA.     Pelvic US:  The uterus  measures  11.0 x 7.8 x 7.6 cm .     No prior abdominal surgeries.  x 2. Family history significant for daughter with breast cancer. No FH ovarian, uterine, or colon cancer.      CT C/A/P  Negative for metastatic disease     She is now s/p RA converted to ROSE MARIE BSO LND and omentectomy on 2018. 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 Normal fallopian tubes and ovaries bilaterally. 1-2mm omental implant concerning for disease. Otherwise no gross visible intraperitoneal disease     Final pathology:  LOW-GRADE ENDOMETRIAL STROMAL SARCOMA WITH DEEP MYOMETRIAL INVASION (95%), Tumor size: 11 x 6 x 5 cm  ADJACENT ENDOMETRIUM COMPRESSED AND ATROPHIC;  SECTIONS OF CERVIX, PARACERVICAL, AND PARAMETRIAL MARGINS FREE OF MALIGNANCY;  LEFT FALLOPIAN TUBE: NO EVIDENCE OF MALIGNANCY;  LEFT  OVARY: SMALL FOCUS OF SURFACE HYPERPLASIA;  RIGHT FALLOPIAN TUBE: NO EVIDENCE OF MALIGNANCY;  RIGHT OVARY: NO SIGNIFICANT HISTOLOGICAL ABNORMALITY  Lymph nodes and omentum negative for malignancy  Review of Systems   Constitutional:  Negative for appetite change, chills, fatigue and fever.   HENT:  Negative for mouth sores.    Respiratory:  Negative for cough and shortness of breath.    Cardiovascular:  Negative for leg swelling.   Gastrointestinal:  Negative for abdominal pain, blood in stool, constipation and diarrhea.   Endocrine: Negative for cold intolerance.   Genitourinary:  Negative for dysuria and vaginal bleeding.   Musculoskeletal:  Negative for myalgias.   Skin:  Negative for rash.   Allergic/Immunologic: Negative.    Neurological:  Negative for weakness and numbness.   Hematological:  Negative for adenopathy. Does not bruise/bleed easily.   Psychiatric/Behavioral:  Negative for confusion.        Objective:   Physical Exam:   Constitutional: She is oriented to person, place, and time. She appears well-developed and well-nourished.    HENT:   Head: Normocephalic and atraumatic.    Eyes: Pupils are equal, round, and reactive to light. EOM are normal.    Neck: No thyromegaly present.    Cardiovascular:  Normal rate, regular rhythm and intact distal pulses.             Pulmonary/Chest: Effort normal and breath sounds normal. No respiratory distress. She has no wheezes.        Abdominal: Soft. Bowel sounds are normal. She exhibits no distension and no mass. There is no abdominal tenderness.     Genitourinary:    Vagina and rectum normal.      Pelvic exam was performed with patient supine.   There is no lesion on the right labia. There is no lesion on the left labia. Right adnexum displays no mass. Left adnexum displays no mass. Vaginal cuff normal.  Cervix is absent.Uterus is absent.           Musculoskeletal: Normal range of motion and moves all extremeties.      Lymphadenopathy:     She has no cervical  adenopathy. No inguinal adenopathy noted on the right or left side.        Right: No supraclavicular adenopathy present.        Left: No supraclavicular adenopathy present.    Neurological: She is alert and oriented to person, place, and time.    Skin: Skin is warm and dry. No rash noted.    Psychiatric: She has a normal mood and affect.       Assessment:     1. 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 Aromatase inhibitor use    3. Examination prior to chemotherapy        Plan:   No orders of the defined types were placed in this encounter.    No evidence of disease on today's exam.   Feels well, no new symptoms.   Continue letrozole. Recent DXA normal.   RTC 3 months or sooner if needed.       I spent approximately 45 minutes reviewing the available records and evaluating the patient, out of which over 50% of the time was spent face to face with the patient in counseling and coordinating this patient's care.

## 2023-12-13 ENCOUNTER — TELEPHONE (OUTPATIENT)
Dept: INTERNAL MEDICINE | Facility: CLINIC | Age: 73
End: 2023-12-13
Payer: COMMERCIAL

## 2023-12-13 NOTE — TELEPHONE ENCOUNTER
----- Message from Randee Shields sent at 12/13/2023  1:57 PM CST -----  Name of Who is Calling:CUCO AVILA [[id number]]                What is the request in detail: Pt Calling to schedule an apt with you, she was a pt back in 2019 and would like to be seen for stress and sinus.Please call back to further assist.                 Can the clinic reply by [facility]: No                [facility]:[phone]

## 2023-12-14 DIAGNOSIS — C54.1 ENDOMETRIAL STROMAL SARCOMA: ICD-10-CM

## 2023-12-14 RX ORDER — LETROZOLE 2.5 MG/1
2.5 TABLET, FILM COATED ORAL DAILY
Qty: 30 TABLET | Refills: 2 | Status: SHIPPED | OUTPATIENT
Start: 2023-12-14 | End: 2024-03-16 | Stop reason: SDUPTHER

## 2023-12-15 ENCOUNTER — TELEPHONE (OUTPATIENT)
Dept: INTERNAL MEDICINE | Facility: CLINIC | Age: 73
End: 2023-12-15
Payer: COMMERCIAL

## 2023-12-15 NOTE — TELEPHONE ENCOUNTER
Spoke with pt and scheduled her in a 30min slot per scheduling notes for NP((was not able to put pt as NP for some reason after multiple attempts so I had to use an EP slot as Dr. Stephenson was her previous PCP in the past) )      Arlene Heath MA     AD    12/15/23  9:28 AM  Note  LVM for the pt to call to est care with Dr Stephenson. Ok to override a 40 minute slot.

## 2023-12-15 NOTE — TELEPHONE ENCOUNTER
----- Message from Brittany Gross MA sent at 12/15/2023  9:35 AM CST -----  Name of Who is Calling:CUCO AVILA [[id number]]                What is the request in detail: Pt states that someone from the office gave her a call. Please assist.                Can the clinic reply by [facility]: No                What Number to Call Back if not in [facility]: [phone]

## 2024-01-05 ENCOUNTER — PATIENT MESSAGE (OUTPATIENT)
Dept: GYNECOLOGIC ONCOLOGY | Facility: CLINIC | Age: 74
End: 2024-01-05
Payer: COMMERCIAL

## 2024-01-05 ENCOUNTER — TELEPHONE (OUTPATIENT)
Dept: GYNECOLOGIC ONCOLOGY | Facility: CLINIC | Age: 74
End: 2024-01-05
Payer: COMMERCIAL

## 2024-01-05 NOTE — TELEPHONE ENCOUNTER
Called both number again no answer. Appointment with Dr. Stanley canceled and will need to be reschedule the provider has a meeting to attend.

## 2024-02-28 ENCOUNTER — OFFICE VISIT (OUTPATIENT)
Dept: GYNECOLOGIC ONCOLOGY | Facility: CLINIC | Age: 74
End: 2024-02-28
Payer: COMMERCIAL

## 2024-02-28 VITALS
DIASTOLIC BLOOD PRESSURE: 61 MMHG | HEIGHT: 67 IN | WEIGHT: 180 LBS | HEART RATE: 79 BPM | SYSTOLIC BLOOD PRESSURE: 127 MMHG | BODY MASS INDEX: 28.25 KG/M2

## 2024-02-28 DIAGNOSIS — Z79.811 AROMATASE INHIBITOR USE: ICD-10-CM

## 2024-02-28 DIAGNOSIS — Z01.818 EXAMINATION PRIOR TO CHEMOTHERAPY: ICD-10-CM

## 2024-02-28 DIAGNOSIS — C54.1 LOW GRADE ENDOMETRIAL STROMAL SARCOMA OF UTERUS: Primary | ICD-10-CM

## 2024-02-28 PROCEDURE — 3288F FALL RISK ASSESSMENT DOCD: CPT | Mod: CPTII,S$GLB,, | Performed by: OBSTETRICS & GYNECOLOGY

## 2024-02-28 PROCEDURE — 3078F DIAST BP <80 MM HG: CPT | Mod: CPTII,S$GLB,, | Performed by: OBSTETRICS & GYNECOLOGY

## 2024-02-28 PROCEDURE — 99999 PR PBB SHADOW E&M-EST. PATIENT-LVL III: CPT | Mod: PBBFAC,,, | Performed by: OBSTETRICS & GYNECOLOGY

## 2024-02-28 PROCEDURE — 99215 OFFICE O/P EST HI 40 MIN: CPT | Mod: S$GLB,,, | Performed by: OBSTETRICS & GYNECOLOGY

## 2024-02-28 PROCEDURE — 1159F MED LIST DOCD IN RCRD: CPT | Mod: CPTII,S$GLB,, | Performed by: OBSTETRICS & GYNECOLOGY

## 2024-02-28 PROCEDURE — 1126F AMNT PAIN NOTED NONE PRSNT: CPT | Mod: CPTII,S$GLB,, | Performed by: OBSTETRICS & GYNECOLOGY

## 2024-02-28 PROCEDURE — 3074F SYST BP LT 130 MM HG: CPT | Mod: CPTII,S$GLB,, | Performed by: OBSTETRICS & GYNECOLOGY

## 2024-02-28 PROCEDURE — 3008F BODY MASS INDEX DOCD: CPT | Mod: CPTII,S$GLB,, | Performed by: OBSTETRICS & GYNECOLOGY

## 2024-02-28 PROCEDURE — 1101F PT FALLS ASSESS-DOCD LE1/YR: CPT | Mod: CPTII,S$GLB,, | Performed by: OBSTETRICS & GYNECOLOGY

## 2024-02-28 NOTE — PROGRESS NOTES
Subjective:      Patient ID: Skylar Valdez is a 73 y.o. female.    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   Adjuvant letrozole  DEXA 2019  Posterior vagina biopsy 2019- tissue consistent with LG-ESS, lesion essentially removed with biopsy forceps.  Endorsed abdominal pain 2021.  CT 2021 no evidence of disease.      S/p excision of 2-3mm posterior vaginal wall lesion 2021  Pathology shows LG-ESS      Today's visit:  Presents today for surveillance visit and consideration of continuation of letrozole. Without complaints. Denies vaginal bleeding.   Feels well, without complaints.   Disease free interval from recurrence 2.5 years.   DXA 2023 normal bone mineral density.   ___________________   History:  Referred by Dr. Henley for newly diagnosed low grade endometrial stromal sarcoma.      Postmenopausal bleeding. Underwent D&C.   18  FINAL PATHOLOGIC DIAGNOSIS  1., 2. FRAGMENTS OF ENDOMETRIAL TISSUE SHOWING A CELLULAR NEOPLASM, MOST CONSISTENT  WITH A LOW-GRADE ENDOMETRIAL STROMAL SARCOMA.     Pelvic US:  The uterus  measures  11.0 x 7.8 x 7.6 cm .     No prior abdominal surgeries.  x 2. Family history significant for daughter with breast cancer. No FH ovarian, uterine, or colon cancer.      CT C/A/P  Negative for metastatic disease     She is now s/p RA converted to ROSE MARIE BSO LND and omentectomy on 2018. 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 Normal fallopian tubes and ovaries bilaterally. 1-2mm omental implant concerning for disease. Otherwise no gross visible intraperitoneal disease     Final pathology:  LOW-GRADE ENDOMETRIAL STROMAL SARCOMA WITH DEEP MYOMETRIAL INVASION (95%), Tumor size: 11 x 6 x 5 cm  ADJACENT ENDOMETRIUM COMPRESSED AND ATROPHIC;  SECTIONS OF CERVIX, PARACERVICAL, AND PARAMETRIAL MARGINS FREE OF MALIGNANCY;  LEFT FALLOPIAN TUBE: NO EVIDENCE OF  MALIGNANCY;  LEFT OVARY: SMALL FOCUS OF SURFACE HYPERPLASIA;  RIGHT FALLOPIAN TUBE: NO EVIDENCE OF MALIGNANCY;  RIGHT OVARY: NO SIGNIFICANT HISTOLOGICAL ABNORMALITY  Lymph nodes and omentum negative for malignancy  Review of Systems   Constitutional:  Negative for appetite change, chills, fatigue and fever.   HENT:  Negative for mouth sores.    Respiratory:  Negative for cough and shortness of breath.    Cardiovascular:  Negative for leg swelling.   Gastrointestinal:  Negative for abdominal pain, blood in stool, constipation and diarrhea.   Endocrine: Negative for cold intolerance.   Genitourinary:  Negative for dysuria and vaginal bleeding.   Musculoskeletal:  Negative for myalgias.   Skin:  Negative for rash.   Allergic/Immunologic: Negative.    Neurological:  Negative for weakness and numbness.   Hematological:  Negative for adenopathy. Does not bruise/bleed easily.   Psychiatric/Behavioral:  Negative for confusion.        Objective:   Physical Exam:   Constitutional: She is oriented to person, place, and time. She appears well-developed and well-nourished.    HENT:   Head: Normocephalic and atraumatic.    Eyes: Pupils are equal, round, and reactive to light. EOM are normal.    Neck: No thyromegaly present.    Cardiovascular:  Normal rate, regular rhythm and intact distal pulses.             Pulmonary/Chest: Effort normal and breath sounds normal. No respiratory distress. She has no wheezes.        Abdominal: Soft. Bowel sounds are normal. She exhibits no distension and no mass. There is no abdominal tenderness.     Genitourinary:    Vagina and rectum normal.      Pelvic exam was performed with patient supine.   There is no lesion on the right labia. There is no lesion on the left labia. Right adnexum displays no mass. Left adnexum displays no mass. Cervix is absent.Uterus is absent.           Musculoskeletal: Normal range of motion and moves all extremeties.      Lymphadenopathy:     She has no cervical  adenopathy. No inguinal adenopathy noted on the right or left side.        Right: No supraclavicular adenopathy present.        Left: No supraclavicular adenopathy present.    Neurological: She is alert and oriented to person, place, and time.    Skin: Skin is warm and dry. No rash noted.    Psychiatric: She has a normal mood and affect.       Assessment:     1. 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 Aromatase inhibitor use    3. Examination prior to chemotherapy        Plan:   No orders of the defined types were placed in this encounter.    No evidence of disease on today's exam.   Feels well, no new symptoms.   Continue letrozole.  RTC 3 months or sooner if needed.       I spent approximately 45 minutes reviewing the available records and evaluating the patient, out of which over 50% of the time was spent face to face with the patient in counseling and coordinating this patient's care.

## 2024-03-16 DIAGNOSIS — C54.1 ENDOMETRIAL STROMAL SARCOMA: ICD-10-CM

## 2024-03-17 DIAGNOSIS — C54.1 ENDOMETRIAL STROMAL SARCOMA: ICD-10-CM

## 2024-03-17 RX ORDER — LETROZOLE 2.5 MG/1
2.5 TABLET, FILM COATED ORAL DAILY
Qty: 30 TABLET | Refills: 2 | Status: SHIPPED | OUTPATIENT
Start: 2024-03-17 | End: 2024-03-17 | Stop reason: SDUPTHER

## 2024-03-18 RX ORDER — LETROZOLE 2.5 MG/1
2.5 TABLET, FILM COATED ORAL DAILY
Qty: 30 TABLET | Refills: 5 | Status: SHIPPED | OUTPATIENT
Start: 2024-03-18 | End: 2024-04-10

## 2024-04-09 DIAGNOSIS — C54.1 ENDOMETRIAL STROMAL SARCOMA: ICD-10-CM

## 2024-04-10 RX ORDER — LETROZOLE 2.5 MG/1
2.5 TABLET, FILM COATED ORAL
Qty: 90 TABLET | Refills: 1 | Status: SHIPPED | OUTPATIENT
Start: 2024-04-10

## 2024-05-09 ENCOUNTER — HOSPITAL ENCOUNTER (EMERGENCY)
Facility: HOSPITAL | Age: 74
Discharge: HOME | End: 2024-05-09
Payer: MEDICARE

## 2024-05-09 VITALS
WEIGHT: 175 LBS | HEART RATE: 94 BPM | BODY MASS INDEX: 27.41 KG/M2 | TEMPERATURE: 98 F | DIASTOLIC BLOOD PRESSURE: 70 MMHG | RESPIRATION RATE: 20 BRPM | SYSTOLIC BLOOD PRESSURE: 144 MMHG | OXYGEN SATURATION: 87 %

## 2024-05-09 DIAGNOSIS — M25.531 ACUTE PAIN OF RIGHT WRIST: Primary | ICD-10-CM

## 2024-05-09 DIAGNOSIS — S63.501A SPRAIN OF RIGHT WRIST, INITIAL ENCOUNTER: ICD-10-CM

## 2024-05-09 DIAGNOSIS — M79.18 BUTTOCK PAIN: ICD-10-CM
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
## 2024-05-09 PROCEDURE — 29125 APPL SHORT ARM SPLINT STATIC: CPT | Mod: RT,ER

## 2024-05-09 PROCEDURE — 99284 EMERGENCY DEPT VISIT MOD MDM: CPT | Mod: 25,ER

## 2024-05-09 RX ORDER — METHOCARBAMOL 500 MG/1
500 TABLET, FILM COATED ORAL 2 TIMES DAILY PRN
Qty: 10 TABLET | Refills: 0 | Status: SHIPPED | OUTPATIENT
Start: 2024-05-09 | End: 2024-05-14

## 2024-05-09 NOTE — ED PROVIDER NOTES
Encounter Date: 5/9/2024       History     Chief Complaint   Patient presents with    Fall     PT presents to the ED with C/O pain to right wrist/arm and tailbone following slip and fall. Denies hitting head. VSS. AAOx4.     74-year-old female presenting to [facility] with complaints of ground level slip and fall accidental earlier this morning.  Patient reports slipping on slick floor landing on her buttocks also trying to brace herself with right arm outstretched injuring her right wrist.  Patient is right-hand dominant.  Patient denies any numbness or tingling.  Patient denies any bladder/bowel dysfunction or saddle paresthesias.  Patient reports normal ambulation since the incident without difficulty.  No alleviating factors noted.  No other physical complaints at this time.  No head trauma or LOC.    The history is provided by the patient. No  was used.     Review of patient's allergies indicates:  No Known Allergies  Past Medical History:   Diagnosis Date    Diabetes mellitus, type 2     Hyperlipidemia     Hypertension     Thyroid disease      Past Surgical History:   Procedure Laterality Date    EXCISION OF LESION OF VAGINA  7/26/2021    Procedure: EXCISION, LESION, VAGINA;  Surgeon: Grace Stanley MD;  Location: [address];  Service: OB/GYN;;    HYSTERECTOMY      HYSTEROSCOPY WITH DILATION AND CURETTAGE OF UTERUS N/A 9/18/2018    Procedure: HYSTEROSCOPY, WITH DILATION AND CURETTAGE OF UTERUS;  Surgeon: Lacie Henley MD;  Location: [facility];  Service: OB/GYN;  Laterality: N/A;    OMENTECTOMY  11/9/2018    Procedure: OMENTECTOMY;  Surgeon: Grace Stanley MD;  Location: [facility];  Service: OB/GYN;;    ROBOT-ASSISTED LAPAROSCOPIC ABDOMINAL HYSTERECTOMY USING DA DONNA XI N/A 11/9/2018    Procedure: XI ROBOTIC HYSTERECTOMY CONVERTED TO OPEN 1336;  Surgeon: Grace Stanley MD;  Location: [facility];  Service: OB/GYN;  Laterality: N/A;    TONSILLECTOMY      TOTAL  ABDOMINAL HYSTERECTOMY W/ BILATERAL SALPINGOOPHORECTOMY  11/9/2018    Procedure: HYSTERECTOMY, TOTAL, ABDOMINAL, WITH BILATERAL SALPINGO-OOPHORECTOMY;  Surgeon: Grace Stanley MD;  Location: [facility];  Service: OB/GYN;;     Family History   Problem Relation Name Age of Onset    Hypertension Mother      Hyperlipidemia Mother      Stroke Father      Lung disease Father      Cancer Sister      Breast cancer Daughter 2 xs     Ovarian cancer Neg Hx      Colon cancer Neg Hx       Social History     Tobacco Use    Smoking status: Never    Smokeless tobacco: Never   Substance Use Topics    Alcohol use: No    Drug use: No     Review of Systems   Constitutional:  Negative for chills, diaphoresis, fatigue and fever.   HENT:  Negative for congestion, ear pain, sinus pain, sore throat and trouble swallowing.    Eyes:  Negative for pain and visual disturbance.   Respiratory:  Negative for cough, shortness of breath, wheezing and stridor.    Cardiovascular:  Negative for chest pain and palpitations.   Gastrointestinal:  Negative for abdominal pain, diarrhea, nausea and vomiting.   Endocrine: Negative.    Genitourinary:  Negative for dysuria, flank pain and hematuria.   Musculoskeletal:  Positive for arthralgias. Negative for back pain, myalgias and neck pain.   Skin: Negative.    Allergic/Immunologic: Negative.    Neurological:  Negative for dizziness, tremors, syncope, weakness, light-headedness, numbness and headaches.   Hematological: Negative.    Psychiatric/Behavioral: Negative.     All other systems reviewed and are negative.      Physical Exam     Initial Vitals [05/09/24 1417]   BP Pulse Resp Temp SpO2   (!) 144/70 94 20 97.9 °F (36.6 °C) (!) 87 %      MAP       --         Physical Exam    Nursing note and vitals reviewed.  Constitutional: Vital signs are normal. She appears well-developed and well-nourished. She is not diaphoretic. No distress.   74-year-old female clinically well-appearing no acute distress, nontoxic,  breathing comfortably on room air, very polite and pleasant   HENT:   Head: Normocephalic and atraumatic.   Right Ear: Hearing and external ear normal.   Left Ear: Hearing and external ear normal.   Nose: Nose normal.   No signs of head or facial trauma   Eyes: Conjunctivae and EOM are normal. Pupils are equal, round, and reactive to light.   Neck: Trachea normal. Neck supple.   Normal range of motion.  Cardiovascular:  Normal rate, regular rhythm, intact distal pulses and normal pulses.           Pulses:       Radial pulses are 2+ on the right side and 2+ on the left side.   Pulmonary/Chest: Effort normal. No accessory muscle usage. No tachypnea. No respiratory distress.   Musculoskeletal:         General: Tenderness present. No edema. Normal range of motion.      Cervical back: Normal range of motion and neck supple.      Comments: Right wrist generalized tenderness to the distal radius/ulna.  Discomfort noted with flexion and extension although near full active range of motion.  No scaphoid tenderness.  Fine motor intact.  Right shoulder and right elbow unremarkable.  Supple compartments of the right forearm.  Normal sensation, distal pulses intact  Normal steady gait, normal posture, patient moving lower extremities well without difficulty, reported discomfort while sitting     Neurological: She is alert and oriented to person, place, and time. She has normal strength. She displays no tremor. No sensory deficit. She exhibits normal muscle tone. She displays no seizure activity. Coordination normal. GCS score is 15. GCS eye subscore is 4. GCS verbal subscore is 5. GCS motor subscore is 6.   Neuro intact   Skin: Skin is warm and dry. Capillary refill takes less than 2 seconds.         ED Course   Procedures  Labs Reviewed - No data to display       Imaging Results              X-Ray Wrist Complete Right (Final result)  Result time 05/09/24 15:08:45      Final result by Lance Paz MD (05/09/24 15:08:45)                    Impression:    Three views of the right wrist were obtained. No evidence of acute fracture or dislocation.  Suspect remote fracture at the tip of the radial styloid which appears well corticated.  Mild osteopenia.  Mild degenerative changes 1st carpometacarpal joint and radiocarpal joint.  Soft tissues are unremarkable. Impression: No acute fracture or dislocation.      Electronically signed by: Lance Paz MD  Date:    05/09/2024  Time:    15:08               Narrative:    EXAMINATION:  XR WRIST COMPLETE 3 VIEWS RIGHT    CLINICAL HISTORY:  XR WRIST COMPLETE 3 VIEWS RIGHTPain in right wrist    COMPARISON:  5/4/22    FINDINGS:                                     X-Ray Sacrum And Coccyx (Edited Result - FINAL)  Result time 05/09/24 15:09:32      Addendum (preliminary) 1 of 1 by Lance Paz MD (05/09/24 15:09:32)      Findings/impression: Three views of the sacrum were obtained.  Mild degenerative changes.  No fracture.  Normal mineralization.  Mild degenerative changes bilateral hip joints and lower lumbar spine      Electronically signed by: Lance Paz MD  Date:    05/09/2024  Time:    15:09                 Final result by Lance Paz MD (05/09/24 14:44:59)                   Impression:      No acute fracture or dislocation.      Electronically signed by: Lance Paz MD  Date:    05/09/2024  Time:    14:44               Narrative:    EXAMINATION:  XR SACRUM AND COCCYX    CLINICAL HISTORY:  XR SACRUM AND COCCYXUnspecified fall, initial encounter    COMPARISON:  None    FINDINGS:  Three views of the right wrist were obtained.    No evidence of acute fracture or dislocation.  Suspect remote fracture at the tip of the radial styloid which appears well corticated.  Mild osteopenia.  Mild degenerative changes 1st carpometacarpal joint and radiocarpal joint.  Soft tissues are unremarkable.                                       Medications - No data to display  Medical Decision  Making  Discussed care plan patient.  X-ray imaging of right wrist and sacrum unremarkable.  Suspect minor sprain of right wrist.  Old fracture noted to the radial styloid.  Neuro intact, pain controlled, vital signs stable.  Patient educated on rice protocol, symptomatic management.  Fall prevention.  Patient placed in prefabricated right wrist splint for support and stabilization for the next 48 hours, educated on close follow-up with PCP as well as ED return precautions.  Patient is stable, all questions answered ready for discharge.  No signs or symptoms of cauda equina today.    Differential diagnosis wrist sprain, wrist fracture, tailbone contusion, tailbone fracture    Amount and/or Complexity of Data Reviewed  Radiology: ordered.    Risk  Prescription drug management.                                      Clinical Impression:  Final diagnoses:  [M25.531] Acute pain of right wrist (Primary)  [W19.XXXA] Fall  [M79.18] Buttock pain  [S63.501A] Sprain of right wrist, initial encounter          ED Disposition Condition    Discharge Stable          ED Prescriptions       Medication Sig Dispense Start Date End Date Auth. Provider    methocarbamoL (ROBAXIN) 500 MG Tab Take 1 tablet (500 mg total) by mouth 2 (two) times daily as needed (muscle discomfort). 10 tablet 5/9/2024 5/14/2024 Emily Morgan PA-C          Follow-up Information       Follow up With Specialties Details Why Contact Info    PRIMARY CARE MD  Schedule an appointment as soon as possible for a visit in 3 days If symptoms worsen     [facility] - Emergency Dept Emergency Medicine Go to  If symptoms worsen [address] [facility] [id number] [phone]          PATIENT SEEN BY [facility] ONLY.     Emily Morgan PA-C  05/09/24 1520       Emily Morgan PA-C  05/09/24 1527

## 2024-05-09 NOTE — DISCHARGE INSTRUCTIONS
2022      RE: Alex Ledezma  [address] [facility] [address]     Dear Colleague,    Thank you for the opportunity to participate in the care of your patient, Alex Ledezma, at the [facility] at [facility]. Please see a copy of my visit note below.    Pediatric Metabolism Clinic Return Patient Visit     Name: Alex Ledezma  :   2018  MRN:   [id number]  Visit date: 2022  PCP: Deborah Goetz MD.  Managing Metabolic Center(s): 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 He was accompanied to today s visit by his father.      Assessment:   1. 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 Alex has been doing well in the interim. He has continued to have some difficulties with multiple colds and parents are wondering if he might have allergies, which they are planning to discuss further with his PCP later this week. He fortunately has had no interim ED visits or hospitalizations. He has had no interim signs/symptoms of metabolic decompensation or hypoglycemia. He can continue taking his Levocarnitine supplementation during times of illness only due to normal plasma free carnitine levels.  Patient Active Problem List   Diagnosis     Abnormal findings on  screening     MCAD (medium-chain acyl-CoA dehydrogenase deficiency) (H)     Plan:   1. Laboratory studies ordered today: plasma carnitine levels, covid antibody testing, and 25-OH vitamin D levels. Additionally released future orders placed by his PCP. Results/recommendations as noted below.   2. Due to normal plasma free carnitine levels, do not need daily Levocarnitine  Range as tolerated, apply ice for discomfort.  Close follow-up with PCP for continued care and management.   supplementation. During times of illness he should take: Increase Levocarnitine (1gm/10mL soln) take 2 mL (200 mg) three times daily during times of illness and to take 3-4 days after illness symptoms resolve (increased due to interval weight gain). Vitamin D 200-400 international units/day or multivitamin with D daily.   3. Discussed current management and questions. Reviewed that coconut oil in lotions or administered topically would not be contraindicated for him. Continue cornstarch 2-2.5 Tablespoons at bedtime, is appropriate for weight and duration of his fast, especially as he is not having any concerning signs/symptoms of hypoglycemia upon waking in the morning. We discussed that it is fine if they were to mix cornstarch with sugar free yogurt, applesauce, etc, as opposed to liquid.   4. Reviewed special dietary concerns. Continue regular diet and avoiding prolonged fasting. Continue 3 meals with 2-3 snacks per day and continue 2% cow s milk. Reviewed that he can continue on 2% milk and don t have to switch to skim milk, but would be okay to consume intermittently.   5. Continue to observe illness and emergency precautions as reviewed. It is essential that he continues to eat well and avoid prolonged fasting. Our on-call metabolic service is available 24 hours/day by calling the page  ([phone]) and asking for the Genetics and Metabolism doctor on call. Current emergency letter is on file.  6. Return to the Pediatric Metabolism Clinic in 3-4 months for follow-up.       History of Present Illness:   In summary, Alex s initial [place]  screen was collected on 2020 and revealed an elevated hexanoylcarnitine (C6) of 1.71 umol/L (abnl >0.24), elevated octanoylcarnitine (C8) of 18.83 umol/L (abn >0.60), elevated decenoylcarnitine (C10:1) of 0.46 umol/L (abnl >0.13), an elevated decanoylcarnitine (C10) of 1.63 umol/L (abnl > 0.55) and an elevated C8/C2 ratio of 0.88 (abnl > 0.02). The  remainder of his  screen was negative/normal for all screened conditions. The findings on his initial  screen was consistent with those found in babies who are affected with MCAD deficiency, but further biochemical testing was warranted to confirm the screening results. Initial biochemical testing revealed a plasma acylcarnitine profile with elevations consistent with a diagnosis of MCAD deficiency, most specifically revealed elevations of hexanoylcarnitine (C6) of 4.35 nmol/mL (nml <0.14), octanoylcarnitine (C8) 39.84 nmol/mL (nml <0.19), decenoylcarnitine (C10:1) of 1.73 nmol/mL (nml <0.25), and decanoylcarnitine (C10) of 5.10 nmol/mL (nml <0.27). Urine acylglycines revealed over-excretion of hexanoylglycine of 25.53 mg/g Cr (normal 0.2-1.90) and suberylglycine of 187.86 mg/g Cr (normal 0-11.0). His urine organic acids revealed adipic, sebacic, suberic, suberylglycine and 5-hydroxy hexanoic acid. All of these results are consistent with a biochemical diagnosis of MCAD deficiency. His initial plasma carnitine levels were within high normal range, therefore, daily Levocarnitine supplementation was discontinued and levels remained replete off daily supplementation, so he remains on illness dosing. 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 Together, all of the results biochemical and genetic testing confirms Alex s diagnosis of MCAD deficiency.     lAex was last seen in Pediatric Metabolism Clinic on 2022 via virtual video visit. He has had a few viral illnesses/colds in the interim, but has otherwise been doing well. He has had no interim ED visits, hospitalizations, surgeries or new referrals. He is up to date on well visits and immunizations and has his 4 year visit later this week. Parents are considering whether he may need to see an allergist, however, they will be doing some allergy testing with labs today as ordered by PCP.  He has not had interim concern for metabolic decompensation or hypoglycemia. He took his Levocarnitine supplementation in the interim a few times (last about 1.5 weeks ago) due to illness. His father s main concern is whether they should switch to skim milk; whether cornstarch needs to be given with milk only, and whether coconut oil in lotion/topically would be problematic for him related to his MCAD deficiency.       Nutrition History:   He is on age-appropriate diet, taking 2% cow s milk and table foods. He typically will have 3 meals + 1-2 snacks. Each meal typically has a carb + protein + fruit and/or vegetable. He takes about 12 oz of 2% cow s milk. He is eating a variety of foods from all food groups. He is occasionally having more toddler eating habits/pickiness. Continues to love fruits, veggies and meats. He is taking 2.5 Tablespoons of cornstarch mixed in milk at bedtime. He has been sleeping overnight for 10-12 hours and not waking with any concerns of low blood sugar in the morning.      Review of Systems:   Eyes: Negative. Has been seen by optometry and had normal eye exam. No vision concerns. ENT: Rhinorrhea intermittently. Four ear infections to date. Saw ENT in 2021, audiogram WNL and they did not recommend PE tubes at this time. Passed  hearing screen. No hearing concerns. CV: Negative. No murmur or heart defect. Respiratory: Negative. No wheezing. No cough. No reactive airway disease/asthma. No breathing issues. No apnea, no cyanosis, no tachypnea, no signs of respiratory distress. GI: No vomiting, constipation or diarrhea. Regular stools daily. : Negative. Good wet diapers. Starting to have some interest in toilet training. Will urinate in toilet. MS: Negative. Moving all extremities well. Neuro: No history of lethargy, jitteriness or tremors or seizures. Endo: No concerns for hypoglycemia. Integumentary: Occasional dry skin/eczema, using steroid cream as needed on lower back and  stomach for recent eczema flare. No other rashes. Remainder of 10-point review of systems is complete and negative.      Developmental/Educational History:  No developmental concerns and his parents feel his development is on track. He is walking, running, jumping and climbing without issues. Reportedly good fine and gross motor skills. He has a robust vocabulary that is growing. Talking in small phrases. Speech is clear and articulate for the most part. Understanding and following directions. Has a few words in Moroccan. Sleeping well overnight, 10-12 hours, taking cornstarch 2.5 Tablespoons at bedtime. Lately he has had some intermittent coughing at night, which has interrupted his sleep some. Napping during the day. Attending  3 days/week and with grandma two days per week. Recent  assessment went well. Will be changing from  center to  in August when his mother s maternity leave is over. He is doing well with his new baby brother, but has occasional bouts of jealousy.      Family/Social History:   Family History: His baby brother, Sam, was born on 2022, and had a normal  screen, as well as normal urine organic acids and acylglycines. No additional updates to family history since the last visit. See pedigree scanned into patient s chart.      Lives with his parents and baby brother. His mother is an , and his father works with [employer]. Attends  three days per week (Mon, Tues, & Wed) and with his grandmother two days per week. In August, they will have an .  Community resources received currently: none.  Current insurance status: commercial/private ([employer]).      I have reviewed Alex's past medical history, family history, social history, medications and allergies as documented in the electronic medical record. There were no additional findings except as noted.      Review of internal/external records: Available interim primary care  "records (well and acute visit notes, labs, urgent care reports, telephone notes) reviewed via Epic/Care Everywhere from 2/28/2022 - present. Available interim telephone, [facility] communications and labs from 2/28/2022 - present reviewed.      Allergies: No Known Allergies.    Medications:  Current Outpatient Medications   Medication Sig     levOCARNitine (CARNITOR) 1 GM/10ML solution Take 1.8 mLs (180 mg) by mouth 3 times daily during times of illness, take for 3-4 days after illness symptoms resolve.     pediatric multivitamin w/iron (POLY-VI-SOL W/IRON) solution Take 1 mL by mouth daily     triamcinolone (KENALOG) 0.025 % external ointment Apply topically 2 times daily     dexamethasone (DECADRON) 1 MG/ML (HIGH CONC) solution Take 6.48 mLs (6.48 mg) by mouth daily for 1 day 6 mL x 1 to treat croup     Physical Examination:  Height 2' 9.7\" (85.6 cm), weight 26 lb 3.8 oz (11.9 kg), head circumference 50 cm (19.69\").  28 %ile (Z= -0.59) based on CDC (Boys, 2-20 Years) weight-for-age data using vitals from 5/16/2022. 40 %ile (Z= -0.26) based on CDC (Boys, 2-20 Years) Stature-for-age data based on Stature recorded on 5/16/2022. 83 %ile (Z= 0.94) based on CDC (Boys, 0-36 Months) head circumference-for-age based on Head Circumference recorded on 5/16/2022. Body mass index is 16.24 kg/m . 40 %ile (Z= -0.26) based on CDC (Boys, 2-20 Years) BMI-for-age based on BMI available as of 5/16/2022.    General: Alert, interactive and content. Head: Soft, straight hair with normal texture and distribution. Head normocephalic. Eyes: PERRLA. Sclera non-icteric. Red reflexes present and symmetrical bilaterally. Corneal light reflexes present and symmetrical bilaterally. No discharge. Ears: Pinnae appear normally formed, canals patent. TMs pearly grey and translucent bilaterally. Nose: No nasal discharge or flaring. Mouth/Throat: Oral mucosa intact, pink and moist. Gums intact. No lesions. Tongue midline. Tonsils nonerythematous, without " exudate. Pharynx without redness or exudate. Neck: Supple. Full range of motion and strength. Trachea midline. No lymphadenopathy. Respiratory: Thorax symmetrical. Respiratory effort normal, without use of accessory muscles. Breath sounds clear and regular. No adventitious breath sounds. No tachypnea. CV: Heart rate regular, S1 and S2 without murmur. No heaves or thrills. GI: Soft, round and nondistended, with good muscle tone. Bowel sounds present. No hernias or masses. No hepatosplenomegaly. : Deferred. Musculoskeletal/Neuro: Moves all extremities. Muscle strength strong and equal bilaterally. No edema, ecchymosis, erythema, crepitus, clonus or spasticity. Normal tone. No tremors. Integumentary: Skin intact without rash.     Results of laboratory studies collected at this visit:   Results for orders placed or performed in visit on 05/16/22   Vitamin D Deficiency     Status: Normal   Result Value Ref Range    Vitamin D, Total (25-Hydroxy) 24 20 - 75 ug/L    Narrative    Season, race, dietary intake, and treatment affect the concentration of 25-hydroxy-Vitamin D. Values may decrease during winter months and increase during summer months. Values 20-29 ug/L may indicate Vitamin D insufficiency and values <20 ug/L may indicate Vitamin D deficiency.   Carnitine free and total     Status: Abnormal   Result Value Ref Range    Carnitine Free 23 (L) 25 - 55 umol/L    Carnitine Total 44 35 - 90 umol/L    Carnitine Esterified 21 4 - 36 umol/L    Carnitine Esterified/Free Ratio 0.9 (H) 0.1 - 0.8     Additional recommendations based on today's laboratory results: His plasma carnitine levels were well within normal limits, specifically his free carnitine level was increased slightly from his previous levels. He can continue on Levocarnitine supplementation during times of illness at the same dose, however, we should increase his dose based on interval weight gain to: Levocarnitine (1 gram/10 mL) take 2 mL (200 mg) three (3)  times per day during illness (taking for 3-4 days after illness symptoms would resolve). An updated prescription was sent to his pharmacy. His COVID-19 antibody testing was not collected for some reason. His 25-OH vitamin D level was a little low, likely due to coming out of the winter months. I would recommend he take either a multivitamin with vitamin D or just vitamin D supplement, taking 200-400 international units of vitamin D per day. These results/recommendations were relayed to his parents via [email or web address] message.      It was a pleasure to see Alex and his father again today. He is thriving and doing well. I appreciate the opportunity to be involved in his health care. Please do not hesitate to contact me if you have any questions or concerns.      Sincerely,     Joelle Vidal, MS, APRN, CNP  Department of Pediatrics  Division of Genetics and Metabolism  [facility]'[address], [address] [place] [address]  Direct phone: [phone]  Fax: [phone]      42 minutes spent on the date of the encounter doing chart review, review of outside and internal records, review of test results, patient visit, documentation, discussion with family, and further activities as noted.     CC  LAWRENCE MATTHEW     Copy to patient  Parents of Alex Ledezma  [address] [facility] [address]      Please do not hesitate to contact me if you have any questions/concerns.     Sincerely,       Joelle Vidal, NP, APRN CNP

## 2024-07-22 ENCOUNTER — TELEPHONE (OUTPATIENT)
Dept: GYNECOLOGIC ONCOLOGY | Facility: CLINIC | Age: 74
End: 2024-07-22
Payer: MEDICARE

## 2024-07-22 DIAGNOSIS — Z12.31 SCREENING MAMMOGRAM, ENCOUNTER FOR: Primary | ICD-10-CM

## 2024-07-22 NOTE — TELEPHONE ENCOUNTER
----- Message from MYRANDA Mccoy sent at 7/22/2024 10:44 AM CDT -----  Done!  ----- Message -----  From: Abena Fraser, RN  Sent: 7/22/2024  10:12 AM CDT  To: MYRANDA Mccoy    Are you ok with putting order in? I'll reach out to get scheduled.  ----- Message -----  From: Neeru Stubbs  Sent: 7/22/2024  10:05 AM CDT  To: Jaden Edwards Staff    Type:  Mammogram     Caller is requesting to schedule their annual mammogram appointment.  Order is not listed in EPIC.  Please enter order and contact patient to schedule.     Name of Caller: CUCO AVILA [[id number]]    Where would they like the mammogram performed?     Would the patient rather a call back or a response via [facility]? Call back     Best Call Back Number: [phone]    Additional Information:

## 2024-08-13 DIAGNOSIS — C54.1 ENDOMETRIAL STROMAL SARCOMA: ICD-10-CM

## 2024-08-13 RX ORDER — LETROZOLE 2.5 MG/1
2.5 TABLET, FILM COATED ORAL DAILY
Qty: 90 TABLET | Refills: 1 | Status: SHIPPED | OUTPATIENT
Start: 2024-08-13

## 2024-08-27 ENCOUNTER — HOSPITAL ENCOUNTER (OUTPATIENT)
Dept: RADIOLOGY | Facility: HOSPITAL | Age: 74
Discharge: HOME | End: 2024-08-27
Attending: PHYSICIAN ASSISTANT
Payer: MEDICARE

## 2024-08-27 DIAGNOSIS — Z12.31 SCREENING MAMMOGRAM, ENCOUNTER FOR: ICD-10-CM

## 2024-08-27 PROCEDURE — 77063 BREAST TOMOSYNTHESIS BI: CPT | Mod: 26,,, | Performed by: RADIOLOGY

## 2024-08-27 PROCEDURE — 77067 SCR MAMMO BI INCL CAD: CPT | Mod: 26,,, | Performed by: RADIOLOGY

## 2024-08-27 PROCEDURE — 77067 SCR MAMMO BI INCL CAD: CPT | Mod: TC,PN

## 2024-08-28 ENCOUNTER — OFFICE VISIT (OUTPATIENT)
Dept: GYNECOLOGIC ONCOLOGY | Facility: CLINIC | Age: 74
End: 2024-08-28
Payer: MEDICARE

## 2024-08-28 VITALS
BODY MASS INDEX: 25.11 KG/M2 | HEIGHT: 67 IN | OXYGEN SATURATION: 99 % | SYSTOLIC BLOOD PRESSURE: 109 MMHG | WEIGHT: 160 LBS | DIASTOLIC BLOOD PRESSURE: 59 MMHG | HEART RATE: 78 BPM

## 2024-08-28 DIAGNOSIS — Z01.818 EXAMINATION PRIOR TO CHEMOTHERAPY: ICD-10-CM

## 2024-08-28 DIAGNOSIS — Z79.811 AROMATASE INHIBITOR USE: ICD-10-CM

## 2024-08-28 DIAGNOSIS — C54.1 ENDOMETRIAL STROMAL SARCOMA: Primary | ICD-10-CM

## 2024-08-28 PROCEDURE — 4010F ACE/ARB THERAPY RXD/TAKEN: CPT | Mod: CPTII,S$GLB,, | Performed by: OBSTETRICS & GYNECOLOGY

## 2024-08-28 PROCEDURE — 99999 PR PBB SHADOW E&M-EST. PATIENT-LVL III: CPT | Mod: PBBFAC,,, | Performed by: OBSTETRICS & GYNECOLOGY

## 2024-08-28 PROCEDURE — 1101F PT FALLS ASSESS-DOCD LE1/YR: CPT | Mod: CPTII,S$GLB,, | Performed by: OBSTETRICS & GYNECOLOGY

## 2024-08-28 PROCEDURE — 1126F AMNT PAIN NOTED NONE PRSNT: CPT | Mod: CPTII,S$GLB,, | Performed by: OBSTETRICS & GYNECOLOGY

## 2024-08-28 PROCEDURE — 3008F BODY MASS INDEX DOCD: CPT | Mod: CPTII,S$GLB,, | Performed by: OBSTETRICS & GYNECOLOGY

## 2024-08-28 PROCEDURE — 1159F MED LIST DOCD IN RCRD: CPT | Mod: CPTII,S$GLB,, | Performed by: OBSTETRICS & GYNECOLOGY

## 2024-08-28 PROCEDURE — 99215 OFFICE O/P EST HI 40 MIN: CPT | Mod: S$GLB,,, | Performed by: OBSTETRICS & GYNECOLOGY

## 2024-08-28 PROCEDURE — 3074F SYST BP LT 130 MM HG: CPT | Mod: CPTII,S$GLB,, | Performed by: OBSTETRICS & GYNECOLOGY

## 2024-08-28 PROCEDURE — 3288F FALL RISK ASSESSMENT DOCD: CPT | Mod: CPTII,S$GLB,, | Performed by: OBSTETRICS & GYNECOLOGY

## 2024-08-28 PROCEDURE — 3078F DIAST BP <80 MM HG: CPT | Mod: CPTII,S$GLB,, | Performed by: OBSTETRICS & GYNECOLOGY

## 2024-08-28 NOTE — PROGRESS NOTES
Subjective:      Patient ID: Skylar Valdez is a 74 y.o. female.    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   Adjuvant letrozole  DEXA 2019  Posterior vagina biopsy 2019- tissue consistent with LG-ESS, lesion essentially removed with biopsy forceps.  Endorsed abdominal pain 2021.  CT 2021 no evidence of disease.      S/p excision of 2-3mm posterior vaginal wall lesion 2021  Pathology shows LG-ESS      Today's visit:  Presents today for surveillance visit and consideration of continuation of letrozole. Without complaints. Denies vaginal bleeding.   Feels well, without complaints.   Disease free interval from recurrence 3 years.   DXA 2023 normal bone mineral density.      recently passed away in May 2024 multiple myeloma.   ___________________   History:  Referred by Dr. Henley for newly diagnosed low grade endometrial stromal sarcoma.      Postmenopausal bleeding. Underwent D&C.   18  FINAL PATHOLOGIC DIAGNOSIS  1., 2. FRAGMENTS OF ENDOMETRIAL TISSUE SHOWING A CELLULAR NEOPLASM, MOST CONSISTENT  WITH A LOW-GRADE ENDOMETRIAL STROMAL SARCOMA.     Pelvic US:  The uterus  measures  11.0 x 7.8 x 7.6 cm .     No prior abdominal surgeries.  x 2. Family history significant for daughter with breast cancer. No FH ovarian, uterine, or colon cancer.      CT C/A/P  Negative for metastatic disease     She is now s/p RA converted to ROSE MARIE BSO LND and omentectomy on 2018. 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 Normal fallopian tubes and ovaries bilaterally. 1-2mm omental implant concerning for disease. Otherwise no gross visible intraperitoneal disease     Final pathology:  LOW-GRADE ENDOMETRIAL STROMAL SARCOMA WITH DEEP MYOMETRIAL INVASION (95%), Tumor size: 11 x 6 x 5 cm  ADJACENT ENDOMETRIUM COMPRESSED AND ATROPHIC;  SECTIONS OF CERVIX, PARACERVICAL, AND PARAMETRIAL MARGINS FREE  OF MALIGNANCY;  LEFT FALLOPIAN TUBE: NO EVIDENCE OF MALIGNANCY;  LEFT OVARY: SMALL FOCUS OF SURFACE HYPERPLASIA;  RIGHT FALLOPIAN TUBE: NO EVIDENCE OF MALIGNANCY;  RIGHT OVARY: NO SIGNIFICANT HISTOLOGICAL ABNORMALITY  Lymph nodes and omentum negative for malignancy  Review of Systems   Constitutional:  Negative for appetite change, chills, fatigue and fever.   HENT:  Negative for mouth sores.    Respiratory:  Negative for cough and shortness of breath.    Cardiovascular:  Negative for leg swelling.   Gastrointestinal:  Negative for abdominal pain, blood in stool, constipation and diarrhea.   Endocrine: Negative for cold intolerance.   Genitourinary:  Negative for dysuria and vaginal bleeding.   Musculoskeletal:  Negative for myalgias.   Skin:  Negative for rash.   Allergic/Immunologic: Negative.    Neurological:  Negative for weakness and numbness.   Hematological:  Negative for adenopathy. Does not bruise/bleed easily.   Psychiatric/Behavioral:  Negative for confusion.        Objective:   Physical Exam:   Constitutional: She is oriented to person, place, and time. She appears well-developed and well-nourished.    HENT:   Head: Normocephalic and atraumatic.    Eyes: Pupils are equal, round, and reactive to light. EOM are normal.    Neck: No thyromegaly present.    Cardiovascular:  Normal rate, regular rhythm and intact distal pulses.             Pulmonary/Chest: Effort normal and breath sounds normal. No respiratory distress. She has no wheezes.        Abdominal: Soft. Bowel sounds are normal. She exhibits no distension and no mass. There is no abdominal tenderness.     Genitourinary:    Vagina and rectum normal.      Pelvic exam was performed with patient supine.   There is no lesion on the right labia. There is no lesion on the left labia. Right adnexum displays no mass. Left adnexum displays no mass. Cervix is absent.Uterus is absent.           Musculoskeletal: Normal range of motion and moves all extremeties.       Lymphadenopathy:     She has no cervical adenopathy. No inguinal adenopathy noted on the right or left side.        Right: No supraclavicular adenopathy present.        Left: No supraclavicular adenopathy present.    Neurological: She is alert and oriented to person, place, and time.    Skin: Skin is warm and dry. No rash noted.    Psychiatric: She has a normal mood and affect.       Assessment:     1. Endometrial stromal sarcoma    2. Examination prior to chemotherapy    3. Aromatase inhibitor use          Plan:     Orders Placed This Encounter   Procedures    CT Chest Abdomen Pelvis With IV Contrast (XPD) Routine Oral Contrast    Creatinine, serum     No evidence of disease on today's exam.   Feels well, no new symptoms.   Continue letrozole.  Interval surveillance imaging.   RTC 3 months or sooner if needed.       I spent approximately 45 minutes reviewing the available records and evaluating the patient, out of which over 50% of the time was spent face to face with the patient in counseling and coordinating this patient's care.

## 2024-09-11 ENCOUNTER — PATIENT MESSAGE (OUTPATIENT)
Dept: GYNECOLOGIC ONCOLOGY | Facility: CLINIC | Age: 74
End: 2024-09-11
Payer: MEDICARE

## 2024-09-11 DIAGNOSIS — C54.1 ENDOMETRIAL STROMAL SARCOMA: Primary | ICD-10-CM

## 2024-09-11 RX ORDER — LETROZOLE 2.5 MG/1
2.5 TABLET, FILM COATED ORAL DAILY
Qty: 10 TABLET | Refills: 0 | Status: SHIPPED | OUTPATIENT
Start: 2024-09-11 | End: 2025-09-11

## 2024-09-18 ENCOUNTER — HOSPITAL ENCOUNTER (OUTPATIENT)
Dept: RADIOLOGY | Facility: HOSPITAL | Age: 74
Discharge: HOME | End: 2024-09-18
Attending: OBSTETRICS & GYNECOLOGY
Payer: MEDICARE

## 2024-09-18 ENCOUNTER — TELEPHONE (OUTPATIENT)
Dept: GYNECOLOGIC ONCOLOGY | Facility: CLINIC | Age: 74
End: 2024-09-18
Payer: MEDICARE

## 2024-09-18 DIAGNOSIS — C54.1 ENDOMETRIAL STROMAL SARCOMA: ICD-10-CM

## 2024-09-18 PROCEDURE — 71260 CT THORAX DX C+: CPT | Mod: 26,,, | Performed by: RADIOLOGY

## 2024-09-18 PROCEDURE — 25500020 PHARM REV CODE 255: Mod: PN | Performed by: OBSTETRICS & GYNECOLOGY

## 2024-09-18 PROCEDURE — 74177 CT ABD & PELVIS W/CONTRAST: CPT | Mod: 26,,, | Performed by: RADIOLOGY

## 2024-09-18 PROCEDURE — 74177 CT ABD & PELVIS W/CONTRAST: CPT | Mod: TC,PN

## 2024-09-18 RX ADMIN — IOHEXOL 30 ML: 300 INJECTION, SOLUTION INTRAVENOUS at 09:09

## 2024-09-18 RX ADMIN — IOHEXOL 100 ML: 350 INJECTION, SOLUTION INTRAVENOUS at 10:09

## 2024-09-18 NOTE — TELEPHONE ENCOUNTER
----- Message from Grace Stanley MD sent at 9/18/2024 12:34 PM CDT -----  Regarding: call with normal results  Please let patient know her CT was normal. No concerns. Thank you.  ----- Message -----  From: Interface, Rad Results In  Sent: 9/18/2024  12:06 PM CDT  To: Grace Stanley MD

## 2024-11-12 DIAGNOSIS — C54.1 ENDOMETRIAL STROMAL SARCOMA: ICD-10-CM

## 2024-11-12 RX ORDER — LETROZOLE 2.5 MG/1
2.5 TABLET, FILM COATED ORAL DAILY
Qty: 90 TABLET | Refills: 1 | Status: SHIPPED | OUTPATIENT
Start: 2024-11-12

## 2024-11-12 NOTE — TELEPHONE ENCOUNTER
----- Message from Marie sent at 11/12/2024 11:11 AM CST -----   Name of Who is Calling:     What is the request in detail:  patient request call back in reference to medication    letrozole (FEMARA) 2.5 mg Tab / patient want to know if can send emergency supply to pharmacy due to she forgot her medication out of town  Please contact to further discuss and advise      Can the clinic reply by [facility]:     What Number to Call Back if not in [facility]:   [phone]

## 2025-02-25 ENCOUNTER — OFFICE VISIT (OUTPATIENT)
Dept: GYNECOLOGIC ONCOLOGY | Facility: CLINIC | Age: 75
End: 2025-02-25
Payer: MEDICARE

## 2025-02-25 VITALS
HEART RATE: 79 BPM | HEIGHT: 67 IN | RESPIRATION RATE: 18 BRPM | TEMPERATURE: 98 F | DIASTOLIC BLOOD PRESSURE: 63 MMHG | BODY MASS INDEX: 23.39 KG/M2 | OXYGEN SATURATION: 97 % | SYSTOLIC BLOOD PRESSURE: 100 MMHG | WEIGHT: 149 LBS

## 2025-02-25 DIAGNOSIS — Z79.811 AROMATASE INHIBITOR USE: ICD-10-CM

## 2025-02-25 DIAGNOSIS — C54.1 ENDOMETRIAL STROMAL SARCOMA: Primary | ICD-10-CM

## 2025-02-25 PROCEDURE — 3288F FALL RISK ASSESSMENT DOCD: CPT | Mod: CPTII,S$GLB,, | Performed by: OBSTETRICS & GYNECOLOGY

## 2025-02-25 PROCEDURE — 3078F DIAST BP <80 MM HG: CPT | Mod: CPTII,S$GLB,, | Performed by: OBSTETRICS & GYNECOLOGY

## 2025-02-25 PROCEDURE — 1101F PT FALLS ASSESS-DOCD LE1/YR: CPT | Mod: CPTII,S$GLB,, | Performed by: OBSTETRICS & GYNECOLOGY

## 2025-02-25 PROCEDURE — 3044F HG A1C LEVEL LT 7.0%: CPT | Mod: CPTII,S$GLB,, | Performed by: OBSTETRICS & GYNECOLOGY

## 2025-02-25 PROCEDURE — 1126F AMNT PAIN NOTED NONE PRSNT: CPT | Mod: CPTII,S$GLB,, | Performed by: OBSTETRICS & GYNECOLOGY

## 2025-02-25 PROCEDURE — 3074F SYST BP LT 130 MM HG: CPT | Mod: CPTII,S$GLB,, | Performed by: OBSTETRICS & GYNECOLOGY

## 2025-02-25 PROCEDURE — 99214 OFFICE O/P EST MOD 30 MIN: CPT | Mod: S$GLB,,, | Performed by: OBSTETRICS & GYNECOLOGY

## 2025-02-25 PROCEDURE — 99999 PR PBB SHADOW E&M-EST. PATIENT-LVL III: CPT | Mod: PBBFAC,,, | Performed by: OBSTETRICS & GYNECOLOGY

## 2025-02-25 PROCEDURE — 3008F BODY MASS INDEX DOCD: CPT | Mod: CPTII,S$GLB,, | Performed by: OBSTETRICS & GYNECOLOGY

## 2025-02-25 PROCEDURE — 1159F MED LIST DOCD IN RCRD: CPT | Mod: CPTII,S$GLB,, | Performed by: OBSTETRICS & GYNECOLOGY

## 2025-02-25 NOTE — PROGRESS NOTES
Subjective:      Patient ID: Skylar Valdez is a 74 y.o. female.    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   Adjuvant letrozole  DEXA 2019  Posterior vagina biopsy 2019- tissue consistent with LG-ESS, lesion essentially removed with biopsy forceps.  Endorsed abdominal pain 2021.  CT 2021 no evidence of disease.      S/p excision of 2-3mm posterior vaginal wall lesion 2021  Pathology shows LG-ESS     2024 CT C/A/P  No recurrent or metastatic disease      Today's visit:  Presents today for surveillance visit and consideration of continuation of letrozole. Without complaints. Denies vaginal bleeding.   Feels well, without complaints.   Disease free interval from recurrence 3.5 years.   DXA 2023 normal bone mineral density.      passed away in May 2024 multiple myeloma.   ___________________   History:  Referred by Dr. Henley for newly diagnosed low grade endometrial stromal sarcoma.      Postmenopausal bleeding. Underwent D&C.   18  FINAL PATHOLOGIC DIAGNOSIS  1., 2. FRAGMENTS OF ENDOMETRIAL TISSUE SHOWING A CELLULAR NEOPLASM, MOST CONSISTENT  WITH A LOW-GRADE ENDOMETRIAL STROMAL SARCOMA.     Pelvic US:  The uterus  measures  11.0 x 7.8 x 7.6 cm .     No prior abdominal surgeries.  x 2. Family history significant for daughter with breast cancer. No FH ovarian, uterine, or colon cancer.      CT C/A/P  Negative for metastatic disease     She is now s/p RA converted to [facility] BSO LND and omentectomy on 2018. 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 Normal fallopian tubes and ovaries bilaterally. 1-2mm omental implant concerning for disease. Otherwise no gross visible intraperitoneal disease     Final pathology:  LOW-GRADE ENDOMETRIAL STROMAL SARCOMA WITH DEEP MYOMETRIAL INVASION (95%), Tumor size: 11 x 6 x 5 cm  ADJACENT ENDOMETRIUM COMPRESSED AND ATROPHIC;  SECTIONS  "OF CERVIX, PARACERVICAL, AND PARAMETRIAL MARGINS FREE OF MALIGNANCY;  LEFT FALLOPIAN TUBE: NO EVIDENCE OF MALIGNANCY;  LEFT OVARY: SMALL FOCUS OF SURFACE HYPERPLASIA;  RIGHT FALLOPIAN TUBE: NO EVIDENCE OF MALIGNANCY;  RIGHT OVARY: NO SIGNIFICANT HISTOLOGICAL ABNORMALITY  Lymph nodes and omentum negative for malignancy    Review of Systems   Constitutional:  Negative for appetite change, chills, fatigue and fever.   HENT:  Negative for mouth sores.    Respiratory:  Negative for cough and shortness of breath.    Cardiovascular:  Negative for leg swelling.   Gastrointestinal:  Negative for abdominal pain, blood in stool, constipation and diarrhea.   Endocrine: Negative for cold intolerance.   Genitourinary:  Negative for dysuria and vaginal bleeding.   Musculoskeletal:  Negative for myalgias.   Skin:  Negative for rash.   Allergic/Immunologic: Negative.    Neurological:  Negative for weakness and numbness.   Hematological:  Negative for adenopathy. Does not bruise/bleed easily.   Psychiatric/Behavioral:  Negative for confusion.      /63 (BP Location: Right arm, Patient Position: Sitting)   Pulse 79   Temp 97.9 °F (36.6 °C) (Oral)   Resp 18   Ht 5' 7" (1.702 m)   Wt 67.6 kg (149 lb)   LMP 06/01/2010   SpO2 97%   BMI 23.34 kg/m²     Objective:   Physical Exam:   Constitutional: She is oriented to person, place, and time. She appears well-developed and well-nourished. No distress.    HENT:   Head: Normocephalic and atraumatic.    Eyes: No scleral icterus.     Cardiovascular:       Exam reveals no cyanosis and no edema.        Pulmonary/Chest: Effort normal. No respiratory distress.        Abdominal: Soft. She exhibits no distension and no mass. There is no abdominal tenderness.     Genitourinary:    Vagina normal.      Pelvic exam was performed with patient supine.   There is no lesion on the right labia. There is no lesion on the left labia. Right adnexum displays no mass. Left adnexum displays no mass. No " bleeding in the vagina. Cervix is absent.Uterus is absent.           Musculoskeletal: Normal range of motion and moves all extremeties. No edema.      Lymphadenopathy:     She has no cervical adenopathy. No inguinal adenopathy noted on the right or left side.        Right: No supraclavicular adenopathy present.        Left: No supraclavicular adenopathy present.    Neurological: She is alert and oriented to person, place, and time.    Skin: Skin is warm and dry. No rash noted. No cyanosis. No pallor.    Psychiatric: She has a normal mood and affect.       Assessment:     1. Endometrial stromal sarcoma    2. Aromatase inhibitor use            Plan:     No orders of the defined types were placed in this encounter.    No evidence of disease on today's exam.   Feels well, no new symptoms.   Continue letrozole.  RTC 6 months or sooner if needed.    I spent approximately 30 minutes reviewing the available records and evaluating the patient, out of which over 50% of the time was spent face to face with the patient in counseling and coordinating this patient's care.

## 2025-07-12 ENCOUNTER — HOSPITAL ENCOUNTER (EMERGENCY)
Facility: HOSPITAL | Age: 75
Discharge: HOME | End: 2025-07-12
Attending: EMERGENCY MEDICINE
Payer: MEDICARE

## 2025-07-12 VITALS
WEIGHT: 149.06 LBS | TEMPERATURE: 99 F | HEIGHT: 67 IN | HEART RATE: 74 BPM | SYSTOLIC BLOOD PRESSURE: 143 MMHG | DIASTOLIC BLOOD PRESSURE: 63 MMHG | BODY MASS INDEX: 23.39 KG/M2 | RESPIRATION RATE: 19 BRPM | OXYGEN SATURATION: 100 %

## 2025-07-12 DIAGNOSIS — R55 SYNCOPAL EPISODES: ICD-10-CM
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
## 2025-07-12 DIAGNOSIS — M21.372 FOOT DROP, LEFT: ICD-10-CM

## 2025-07-12 DIAGNOSIS — R91.1 LUNG NODULE: ICD-10-CM

## 2025-07-12 DIAGNOSIS — S02.5XXA CLOSED FRACTURE OF TOOTH, INITIAL ENCOUNTER: Primary | ICD-10-CM

## 2025-07-12 LAB
ABSOLUTE EOSINOPHIL (OHS): 0.03 K/UL
ABSOLUTE MONOCYTE (OHS): 0.34 K/UL (ref 0.3–1)
ABSOLUTE NEUTROPHIL COUNT (OHS): 2.37 K/UL (ref 1.8–7.7)
ALBUMIN SERPL BCP-MCNC: 4.3 G/DL (ref 3.5–5.2)
ALP SERPL-CCNC: 94 UNIT/L (ref 40–150)
ALT SERPL W/O P-5'-P-CCNC: 13 UNIT/L (ref 10–44)
ANION GAP (OHS): 14 MMOL/L (ref 8–16)
AST SERPL-CCNC: 26 UNIT/L (ref 11–45)
BASOPHILS # BLD AUTO: 0.04 K/UL
BASOPHILS NFR BLD AUTO: 0.9 %
BILIRUB SERPL-MCNC: 1 MG/DL (ref 0.1–1)
BUN SERPL-MCNC: 13 MG/DL (ref 8–23)
CALCIUM SERPL-MCNC: 10 MG/DL (ref 8.7–10.5)
CHLORIDE SERPL-SCNC: 96 MMOL/L (ref 95–110)
CO2 SERPL-SCNC: 21 MMOL/L (ref 23–29)
CREAT SERPL-MCNC: 0.8 MG/DL (ref 0.5–1.4)
ERYTHROCYTE [DISTWIDTH] IN BLOOD BY AUTOMATED COUNT: 12.4 % (ref 11.5–14.5)
GFR SERPLBLD CREATININE-BSD FMLA CKD-EPI: >60 ML/MIN/1.73/M2
GLUCOSE SERPL-MCNC: 82 MG/DL (ref 70–110)
HCT VFR BLD AUTO: 40 % (ref 37–48.5)
HCV AB SERPL QL IA: NORMAL
HGB BLD-MCNC: 13.7 GM/DL (ref 12–16)
HIV 1+2 AB+HIV1 P24 AG SERPL QL IA: NORMAL
IMM GRANULOCYTES # BLD AUTO: 0.05 K/UL (ref 0–0.04)
IMM GRANULOCYTES NFR BLD AUTO: 1.1 % (ref 0–0.5)
LYMPHOCYTES # BLD AUTO: 1.69 K/UL (ref 1–4.8)
MAGNESIUM SERPL-MCNC: 1.8 MG/DL (ref 1.6–2.6)
MCH RBC QN AUTO: 28.3 PG (ref 27–31)
MCHC RBC AUTO-ENTMCNC: 34.3 G/DL (ref 32–36)
MCV RBC AUTO: 83 FL (ref 82–98)
NUCLEATED RBC (/100WBC) (OHS): 0 /100 WBC
PHOSPHATE SERPL-MCNC: 2.8 MG/DL (ref 2.7–4.5)
PLATELET # BLD AUTO: 218 K/UL (ref 150–450)
PMV BLD AUTO: 10.6 FL (ref 9.2–12.9)
POCT GLUCOSE: 89 MG/DL (ref 70–110)
POTASSIUM SERPL-SCNC: 3.3 MMOL/L (ref 3.5–5.1)
PROT SERPL-MCNC: 8.6 GM/DL (ref 6–8.4)
RBC # BLD AUTO: 4.84 M/UL (ref 4–5.4)
RELATIVE EOSINOPHIL (OHS): 0.7 %
RELATIVE LYMPHOCYTE (OHS): 37.4 % (ref 18–48)
RELATIVE MONOCYTE (OHS): 7.5 % (ref 4–15)
RELATIVE NEUTROPHIL (OHS): 52.4 % (ref 38–73)
SODIUM SERPL-SCNC: 131 MMOL/L (ref 136–145)
TSH SERPL-ACNC: 0.57 UIU/ML (ref 0.4–4)
WBC # BLD AUTO: 4.52 K/UL (ref 3.9–12.7)

## 2025-07-12 PROCEDURE — 96374 THER/PROPH/DIAG INJ IV PUSH: CPT

## 2025-07-12 PROCEDURE — 93010 ELECTROCARDIOGRAM REPORT: CPT | Mod: ,,, | Performed by: INTERNAL MEDICINE

## 2025-07-12 PROCEDURE — 87389 HIV-1 AG W/HIV-1&-2 AB AG IA: CPT | Performed by: PHYSICIAN ASSISTANT

## 2025-07-12 PROCEDURE — 83735 ASSAY OF MAGNESIUM: CPT | Performed by: EMERGENCY MEDICINE

## 2025-07-12 PROCEDURE — 82962 GLUCOSE BLOOD TEST: CPT

## 2025-07-12 PROCEDURE — 99285 EMERGENCY DEPT VISIT HI MDM: CPT | Mod: 25

## 2025-07-12 PROCEDURE — 84100 ASSAY OF PHOSPHORUS: CPT | Performed by: EMERGENCY MEDICINE

## 2025-07-12 PROCEDURE — 94761 N-INVAS EAR/PLS OXIMETRY MLT: CPT

## 2025-07-12 PROCEDURE — 63600175 PHARM REV CODE 636 W HCPCS: Performed by: EMERGENCY MEDICINE

## 2025-07-12 PROCEDURE — 25000003 PHARM REV CODE 250: Performed by: EMERGENCY MEDICINE

## 2025-07-12 PROCEDURE — 93005 ELECTROCARDIOGRAM TRACING: CPT

## 2025-07-12 PROCEDURE — 84443 ASSAY THYROID STIM HORMONE: CPT | Performed by: EMERGENCY MEDICINE

## 2025-07-12 PROCEDURE — 85025 COMPLETE CBC W/AUTO DIFF WBC: CPT | Performed by: EMERGENCY MEDICINE

## 2025-07-12 PROCEDURE — 80053 COMPREHEN METABOLIC PANEL: CPT | Performed by: EMERGENCY MEDICINE

## 2025-07-12 PROCEDURE — 86803 HEPATITIS C AB TEST: CPT | Performed by: PHYSICIAN ASSISTANT

## 2025-07-12 RX ORDER — MAGNESIUM SULFATE HEPTAHYDRATE 40 MG/ML
2 INJECTION, SOLUTION INTRAVENOUS ONCE
Status: COMPLETED | OUTPATIENT
Start: 2025-07-12 | End: 2025-07-12

## 2025-07-12 RX ADMIN — POTASSIUM BICARBONATE 50 MEQ: 977.5 TABLET, EFFERVESCENT ORAL at 10:07

## 2025-07-12 RX ADMIN — SODIUM CHLORIDE, POTASSIUM CHLORIDE, SODIUM LACTATE AND CALCIUM CHLORIDE 500 ML: 600; 310; 30; 20 INJECTION, SOLUTION INTRAVENOUS at 10:07

## 2025-07-12 RX ADMIN — MAGNESIUM SULFATE HEPTAHYDRATE 2 G: 40 INJECTION, SOLUTION INTRAVENOUS at 10:07

## 2025-07-13 LAB
OHS QRS DURATION: 70 MS
OHS QTC CALCULATION: 428 MS

## 2025-07-13 NOTE — ED NOTES
Skylar Valdez, a 75 y.o. female presents to the ED w/ complaint of Fall   [place] at airport. Reports pain to left side of head and left leg. 2 broken teeth. +LOC. -bloodthinners    Triage note:  Chief Complaint   Patient presents with    Fall     Syncopal fall at airport. 2 broken front teeth. Left sided face pain, Denies blood thinner use or nausea.      Review of patient's allergies indicates:  No Known Allergies  Past Medical History:   Diagnosis Date    Diabetes mellitus, type 2     Hyperlipidemia     Hypertension     Thyroid disease

## 2025-07-13 NOTE — ED PROVIDER NOTES
"Encounter Date: 7/12/2025       History     Chief Complaint   Patient presents with    Fall     [place]opal [place] at airport. 2 broken front teeth. Left sided face pain, Denies blood thinner use or nausea.      HPI  75-year-old female with past medical history as noted below, additionally she has a history of endometrial cancer in remission, not on blood thinners, coming in after a fall at the airport with possible LOC.    Patient states she was at a multiple day conference in which she was sitting for 10 hours today for the last 3 days.  She was flying back, and in her usual state of health with no complaints when she was walking and felt like she lost her footing causing her to fall forward hitting her face and bracing with her left arm.  She was able to get back up with health.  Although she remembers losing her footing and falling she says that she may have lost consciousness for "one second" after the fall.  No seizure-like activity reported by bystanders.     Currently she is complaining of some facial discomfort, left elbow pain as well as some left leg pain and numbness.  Denies any neck or back pain, no chest pain no shortness a breath no abdominal pain no right upper or right lower extremity pain, numbness weakness.    Prior to the event she was usual state of health no dizziness weakness chest pain dysuria vomiting diarrhea headache or any other complaints.    She did say that she accidentally hit her left outer ankle on something 3 days ago which had caused some swelling what resolved with ice.    Review of patient's allergies indicates:  No Known Allergies  Past Medical History:   Diagnosis Date    Diabetes mellitus, type 2     Hyperlipidemia     Hypertension     Thyroid disease      Past Surgical History:   Procedure Laterality Date    EXCISION OF LESION OF VAGINA  7/26/2021    Procedure: EXCISION, LESION, VAGINA;  Surgeon: Grace Stanley MD;  Location: [facility] OR [address] [place] [address];  Service: OB/GYN;;    HYSTERECTOMY "      HYSTEROSCOPY WITH DILATION AND CURETTAGE OF UTERUS N/A 9/18/2018    Procedure: HYSTEROSCOPY, WITH DILATION AND CURETTAGE OF UTERUS;  Surgeon: Lacie Henley MD;  Location: [facility] OR;  Service: OB/GYN;  Laterality: N/A;    OMENTECTOMY  11/9/2018    Procedure: OMENTECTOMY;  Surgeon: Grace Stanley MD;  Location: [facility] OR;  Service: OB/GYN;;    ROBOT-ASSISTED LAPAROSCOPIC ABDOMINAL HYSTERECTOMY USING DA DONNA XI N/A 11/9/2018    Procedure: XI ROBOTIC HYSTERECTOMY CONVERTED TO OPEN 1336;  Surgeon: Grace Stanley MD;  Location: [facility] OR;  Service: OB/GYN;  Laterality: N/A;    TONSILLECTOMY      TOTAL ABDOMINAL HYSTERECTOMY W/ BILATERAL SALPINGOOPHORECTOMY  11/9/2018    Procedure: HYSTERECTOMY, TOTAL, ABDOMINAL, WITH BILATERAL SALPINGO-OOPHORECTOMY;  Surgeon: Grace Stanley MD;  Location: [facility];  Service: OB/GYN;;     Family History   Problem Relation Name Age of Onset    Hypertension Mother      Hyperlipidemia Mother      Stroke Father      Lung disease Father      Cancer Sister      Breast cancer Daughter 2 xs     Ovarian cancer Neg Hx      Colon cancer Neg Hx       Social History[1]  Review of Systems    Physical Exam     Initial Vitals [07/12/25 1859]   BP Pulse Resp Temp SpO2   (!) 148/86 87 20 99 °F (37.2 °C) 98 %      MAP       --         Physical Exam    Physical Exam:  CONSTITUTIONAL: Well developed, well nourished, in no acute distress.  HENT: Normocephalic, atraumatic, no facial tenderness or deformities, she does have to cracked teeth her left central and lateral incisor, no pulp exposed.  No loose teeth.  No lip or gum damage.  EYES: Sclerae anicteric, pupils equal round reactive, extraocular is are intact, no nystagmus.  NECK: Supple, no thyroid enlargement  CARDIOVASCULAR: Regular rate and rhythm without any murmurs, gallops, rubs.  RESPIRATORY: Speaking in full sentences. Breathing comfortably. Auscultation of the lungs revealed normal breath sounds b/l, no wheezing, no rales, no  rhonchi.  ABDOMEN: Soft and nontender, no masses, no rebound or guarding   NEUROLOGIC: Alert, interacting normally. No facial droop. Voice is clear. Speech is fluent.  She has 5/5 strength bilaterally upper extremities.  Right lower extremity with 5/5 strength throughout.  Left lower extremity with 5/5 strength of the hip flexors and extensors, knee flexors and extensors.  She has intact plantar flexion of the foot but has a foot drop with weakness on the left foot to dorsiflexion, as well as left toes to dorsiflexion.  She is able to plantar flex appropriately.  Intact sensation to light touch bilateral lower extremities and upper extremities.  MSK:  There is no C, T or L-spine tenderness.  She has normal range motion throughout the bilateral upper extremities with no tenderness to palpation or deformities, with the exception of some discomfort to the left elbow with palpation although she has normal range motion there.  No gross deformities there.  She has no deformities or tenderness to palpation to the bilateral lower extremities.  SKIN: Warm and dry. No visible rash on exposed areas of skin.    Psych: Mood and affect normal.       ED Course   Procedures  Labs Reviewed   COMPREHENSIVE METABOLIC PANEL - Abnormal       Result Value    Sodium 131 (*)     Potassium 3.3 (*)     Chloride 96      CO2 21 (*)     Glucose 82      BUN 13      Creatinine 0.8      Calcium 10.0      Protein Total 8.6 (*)     Albumin 4.3      Bilirubin Total 1.0      ALP 94      AST 26      ALT 13      Anion Gap 14      eGFR >60     CBC WITH DIFFERENTIAL - Abnormal    WBC 4.52      RBC 4.84      HGB 13.7      HCT 40.0      MCV 83      MCH 28.3      MCHC 34.3      RDW 12.4      Platelet Count 218      MPV 10.6      Nucleated RBC 0      Neut % 52.4      Lymph % 37.4      Mono % 7.5      Eos % 0.7      Basophil % 0.9      Imm Grans % 1.1 (*)     Neut # 2.37      Lymph # 1.69      Mono # 0.34      Eos # 0.03      Baso # 0.04      Imm Grans # 0.05  (*)     Narrative:     This is an appended report.  These results have been appended to a previously verified report.   HEPATITIS C ANTIBODY - Normal    Hep C Ab Interp Non-Reactive     HIV 1 / 2 ANTIBODY - Normal    HIV 1/2 Ag/Ab Non-Reactive     MAGNESIUM - Normal    Magnesium  1.8     PHOSPHORUS - Normal    Phosphorus Level 2.8     TSH - Normal    TSH 0.573     CBC W/ AUTO DIFFERENTIAL    Narrative:     The following orders were created for panel order CBC auto differential.  Procedure                               Abnormality         Status                     ---------                               -----------         ------                     CBC with Differential[[id number]]       Abnormal            Final result                 Please view results for these tests on the individual orders.   HEP C VIRUS HOLD SPECIMEN   URINALYSIS, REFLEX TO URINE CULTURE   POCT GLUCOSE    POCT Glucose 89            Imaging Results              X-Ray Elbow Complete Left (Final result)  Result time 07/12/25 20:56:49      Final result by Ady Bernstein MD (07/12/25 20:56:49)                   Impression:      No acute displaced fracture-dislocation identified.      Electronically signed by: Ady Bernstein MD  Date:    07/12/2025  Time:    20:56               Narrative:    EXAMINATION:  XR ELBOW COMPLETE 3 VIEW LEFT    CLINICAL HISTORY:  Unspecified fall, initial encounter    TECHNIQUE:  AP, lateral, and oblique views of the left elbow were performed.    COMPARISON:  None    FINDINGS:  IV line in place at the antecubital fossa.  Overall alignment is within normal limits. No displaced fracture, dislocation or destructive osseous process.  Mild degenerative change about the elbow.  No large elbow joint effusion.  Prominent enthesophyte at the posterior olecranon.  No subcutaneous emphysema or radiopaque foreign body.                                       CT Lumbar Spine Without Contrast (Final result)  Result time 07/12/25 22:00:37       Final result by Remy Thayer MD (07/12/25 22:00:37)                   Impression:      Multilevel degenerative changes.    No acute fracture deformity or acute traumatic vertebral malalignment in the lumbar spine      Electronically signed by: Remy Thayer  Date:    07/12/2025  Time:    22:00               Narrative:    EXAMINATION:  CT LUMBAR SPINE WITHOUT CONTRAST    CLINICAL HISTORY:  Low back pain, trauma;Left-sided footdrop, fall at airport;    TECHNIQUE:  Low-dose axial, sagittal and coronal reformations are obtained through the lumbar spine.  Contrast was not administered.    COMPARISON:  CT chest abdomen pelvis with IV contrast 09/18/2024.    FINDINGS:  Allowing for mild beam hardening and or motion artifacts, there is no CT evidence of acute fracture deformity or acute traumatic vertebral malalignment in the lumbar spine.  Preserved lumbar lordosis.    Other findings include some osteopenia and multilevel degenerative changes, and including facet disease throughout the lumbar spine, and lumbar Baastrup's disease (most pronounced at L3-4 and L4-5, and minimal at L2-3).    There remains minimal grade 1 anterolisthesis at L4-5, likely on the basis of the facet disease.  No associated acute or chronic spondylolysis is demonstrated.    There remains disc space narrowing and vacuum disc phenomenon at T11-T12.  Lumbar intervertebral disc space heights remain well maintained.    Pre-existing inferior endplate Schmorl's nodes at L1 and L3, similar to 04/05/2021.    Findings by disc level:    T12-L1: No significant vertebral canal or foraminal stenosis.    L1-L2: Mild concentric disc.  Mild bilateral facet hypertrophy.  Minimal vertebral canal and foraminal stenosis.    L2-L3: Minimal concentric disc protrusion.  Mild-to-moderate bilateral facet hypertrophy mild ligamentum flavum thickening.  Mildly trefoil appearance of the thecal sac, suggesting some mild lumbar stenosis.  Mild foraminal stenosis, left  greater than right.    L3-L4: Congenital disc protrusion.  Bilateral facet hypertrophy.  Moderate trefoil appearance of the thecal sac, compatible with spinal stenosis.  No substantial foraminal stenosis.    L4-5: Posterior uncovered disc secondary to the grade 1 anterolisthesis.  Bilateral substantial facet hypertrophy.  Markedly trefoil appearance of the thecal sac compatible with substantial spinal stenosis.  Moderate right neural foraminal stenosis, mainly on the basis of soft tissue.  Minimal left neural foraminal stenosis.    L5-S1: Minimal concentric disc protrusion.  Bilateral facet hypertrophy.  Minimal canal stenosis.  Mild left neural foraminal stenosis.    No acute CT abnormality in the visualized portions of the retroperitoneum or intraperitoneal compartments.  Scattered aortoiliac and branch vessel atherosclerotic calcifications.                                        CT Cervical Spine Without Contrast (Final result)  Result time 07/12/25 21:05:46      Final result by Ady Bernstein MD (07/12/25 21:05:46)                   Impression:      1. No acute intracranial abnormality.  2. Sequela of chronic microvascular ischemic change with cerebral volume loss.  3. No CT evidence of cervical spine acute osseous traumatic injury.  4. Moderate to advanced cervical spondylosis, as further detailed level by level in the body of the report.  5. Right upper lobe 2 mm solid nodule.  For a solid nodule <6 mm, Fleischner Society 2017 guidelines recommend no routine follow up for a low risk patient, or follow-up with non-contrast chest CT at 12 months in a high risk patient.  6. Other incidental/nonemergent findings in the body of the report.  This report was flagged in Epic as containing an incidental finding.      Electronically signed by: Ady Bernstein MD  Date:    07/12/2025  Time:    21:05               Narrative:    EXAMINATION:  CT HEAD WITHOUT CONTRAST; CT CERVICAL SPINE WITHOUT CONTRAST    CLINICAL  HISTORY:  Head trauma, minor (Age >= 65y);fall;; Neck trauma (Age >= 65y);fall;    TECHNIQUE:  Low dose axial CT images obtained throughout the head and cervical spine without intravenous contrast. Sagittal and coronal reconstructions were performed.    COMPARISON:  Chest CT 09/18/2024    FINDINGS:  Head CT:    Intracranial compartment: Brain appears normally formed. Age-related mild generalized cerebral volume loss.  Ventricles are midline without distortion by mass effect or acute hydrocephalus.  No extra-axial blood or fluid collections.    Mild patchy hypoattenuation of the supratentorial white matter consistent with chronic microvascular ischemic change.  No parenchymal mass, hemorrhage, edema or major vascular distribution infarct.  Skull base atherosclerotic vascular calcifications noted.    Skull/extracranial contents (limited evaluation): No fracture. Mastoid air cells and paranasal sinuses are essentially clear.    Cervical spine CT: Straightening of the cervical lordosis.  Moderate to advanced degenerative change at the atlantodental interval.  Dens and lateral masses are otherwise appropriately aligned and intact.  No occipital condylar fracture.  Vertebral body heights appear relatively maintained without acute compression fracture.  Unchanged small area of sclerosis at the posterior aspect of T2 vertebral body.  No displaced fracture, dislocation or significant listhesis.  No destructive osseous process.  Multilevel degenerative disc disease with loss of disc height, prominent marginal anterior osteophytes which are completely bridging at C4-5 and partially bridging at C5-6 through C7-T1 levels.  No prevertebral soft tissue thickening.  No paraspinal mass or fluid collection.  No subcutaneous emphysema or radiopaque foreign body.    C2-3: Moderate right and severe left neural foraminal narrowing.  No significant spinal canal stenosis.    C3-4: Posterior disc osteophyte complex resulting in mild  acquired canal stenosis.  Severe bilateral neural foraminal narrowing.    C4-5: Posterior disc osteophyte complex resulting in borderline acquired canal stenosis.  Moderate bilateral neural foraminal narrowing.    C5-6: Posterior disc osteophyte complex resulting in at least moderate to severe acquired canal stenosis.  Severe bilateral neural foraminal narrowing, right more than left.    C6-7: Posterior disc osteophyte complex resulting in mild acquired canal stenosis.  Mild to moderate bilateral neural foraminal narrowing, right more than left.    C7-T1: Posterior disc osteophyte complex resulting in minimal acquired canal stenosis.  No significant neural foraminal narrowing.    Included airway is relatively midline and patent.  Small thin minimal pulmonary cyst at the anterior right lung apex, larger from chest CT 09/18/2024.  2 mm solid nodule in the right upper lobe, appears new/more conspicuous from previous chest CT.  No apical pneumothorax.  Calcific atherosclerosis at the bilateral carotid bifurcations/proximal ICAs, left more than right.                                        CT Head Without Contrast (Final result)  Result time 07/12/25 21:05:46      Final result by Ady Bernstein MD (07/12/25 21:05:46)                   Impression:      1. No acute intracranial abnormality.  2. Sequela of chronic microvascular ischemic change with cerebral volume loss.  3. No CT evidence of cervical spine acute osseous traumatic injury.  4. Moderate to advanced cervical spondylosis, as further detailed level by level in the body of the report.  5. Right upper lobe 2 mm solid nodule.  For a solid nodule <6 mm, Fleischner Society 2017 guidelines recommend no routine follow up for a low risk patient, or follow-up with non-contrast chest CT at 12 months in a high risk patient.  6. Other incidental/nonemergent findings in the body of the report.  This report was flagged in Epic as containing an incidental  finding.      Electronically signed by: Ady Bernstein MD  Date:    07/12/2025  Time:    21:05               Narrative:    EXAMINATION:  CT HEAD WITHOUT CONTRAST; CT CERVICAL SPINE WITHOUT CONTRAST    CLINICAL HISTORY:  Head trauma, minor (Age >= 65y);fall;; Neck trauma (Age >= 65y);fall;    TECHNIQUE:  Low dose axial CT images obtained throughout the head and cervical spine without intravenous contrast. Sagittal and coronal reconstructions were performed.    COMPARISON:  Chest CT 09/18/2024    FINDINGS:  Head CT:    Intracranial compartment: Brain appears normally formed. Age-related mild generalized cerebral volume loss.  Ventricles are midline without distortion by mass effect or acute hydrocephalus.  No extra-axial blood or fluid collections.    Mild patchy hypoattenuation of the supratentorial white matter consistent with chronic microvascular ischemic change.  No parenchymal mass, hemorrhage, edema or major vascular distribution infarct.  Skull base atherosclerotic vascular calcifications noted.    Skull/extracranial contents (limited evaluation): No fracture. Mastoid air cells and paranasal sinuses are essentially clear.    Cervical spine CT: Straightening of the cervical lordosis.  Moderate to advanced degenerative change at the atlantodental interval.  Dens and lateral masses are otherwise appropriately aligned and intact.  No occipital condylar fracture.  Vertebral body heights appear relatively maintained without acute compression fracture.  Unchanged small area of sclerosis at the posterior aspect of T2 vertebral body.  No displaced fracture, dislocation or significant listhesis.  No destructive osseous process.  Multilevel degenerative disc disease with loss of disc height, prominent marginal anterior osteophytes which are completely bridging at C4-5 and partially bridging at C5-6 through C7-T1 levels.  No prevertebral soft tissue thickening.  No paraspinal mass or fluid collection.  No subcutaneous  emphysema or radiopaque foreign body.    C2-3: Moderate right and severe left neural foraminal narrowing.  No significant spinal canal stenosis.    C3-4: Posterior disc osteophyte complex resulting in mild acquired canal stenosis.  Severe bilateral neural foraminal narrowing.    C4-5: Posterior disc osteophyte complex resulting in borderline acquired canal stenosis.  Moderate bilateral neural foraminal narrowing.    C5-6: Posterior disc osteophyte complex resulting in at least moderate to severe acquired canal stenosis.  Severe bilateral neural foraminal narrowing, right more than left.    C6-7: Posterior disc osteophyte complex resulting in mild acquired canal stenosis.  Mild to moderate bilateral neural foraminal narrowing, right more than left.    C7-T1: Posterior disc osteophyte complex resulting in minimal acquired canal stenosis.  No significant neural foraminal narrowing.    Included airway is relatively midline and patent.  Small thin minimal pulmonary cyst at the anterior right lung apex, larger from chest CT 09/18/2024.  2 mm solid nodule in the right upper lobe, appears new/more conspicuous from previous chest CT.  No apical pneumothorax.  Calcific atherosclerosis at the bilateral carotid bifurcations/proximal ICAs, left more than right.                                       Medications   lactated ringers bolus 500 mL (500 mLs Intravenous New Bag 7/12/25 2228)   potassium bicarbonate disintegrating tablet 50 mEq (50 mEq Oral Given 7/12/25 2227)   magnesium sulfate 2g in water 50mL IVPB (premix) (2 g Intravenous New Bag 7/12/25 2227)     Medical Decision Making  Amount and/or Complexity of Data Reviewed  Labs: ordered.  Radiology: ordered.    Risk  Prescription drug management.      75-year-old female with past history as noted coming in with fall which appears mechanical given the fact that she felt like she lost her footing subsequently possibly had a brief episode of LOC now with some facial discomfort  left elbow pain and a left footdrop.    Exam as noted above.      Given her fall, head trauma, age will obtain CT of the head C-spine to look for any fractures, intracranial bleeding.  Given her left foot drop will also obtain a lumbar spine x-ray to look for any lumbar pathology/nerve root compression although she has no tenderness in the C or lumbar spine and is not complaining of back pain.    Left elbow x-ray, given localized tenderness, low suspicion for fracture.    She did state that she has been sitting for long periods of time over the last several days suspect peroneal nerve neurapraxia has a cause of her isolated left foot drop.  I suspect that this may be the cause of her initial fall as she states that she stumbled as the cause of the fall.    Screening labs to look for any metabolic hematologic abnormalities.   EKG to look for any signs of arrhythmia.     Tylenol for pain control.   Will monitor in the emergency department for any changes.    Update:  In the ED she remains well-appearing hemodynamically stable.    Labs are only remarkable for mildly low sodium, mildly low potassium.  She is given IV fluids, potassium and magnesium for repletion.  X-ray is unremarkable.    CT scan with chronic findings, acute concerning pathology, incidental lung nodule noted and patient informed as well as need for follow-up.  UA was still pending however patient has no dysuria no frequency no infectious symptoms no abdominal pain at this time not consistent with UTI causing the fall.    Patient re-evaluated her left foot drop is improving although there were still weakness on the left compared to the right to ankle and toe dorsiflexion.    Will outpatient follow up with the primary care doctor for general follow-up as well as refer to neurology for outpatient management of foot drop.  Given her long days sitting, suspect peroneal nerve neurapraxia.  Given lack of other focal complaints or findings not consistent with  stroke at this time.    Patient is comfortable with plan, ED return precautions for any new, worsening worrisome symptoms.    Findings of ED work up and return precautions verbally explained to patient. Patient agrees with discharge plan, return instructions and verbalizes understanding of return precautions.                                           Clinical Impression:  Final diagnoses:  [R55] Syncopal episodes  [W19.XXXA] Fall  [W19.XXXA] Fall - elbow pain  [S02.5XXA] Closed fracture of tooth, initial encounter (Primary)  [M21.372] Foot drop, left  [R91.1] Lung nodule          ED Disposition Condition    Discharge Stable          ED Prescriptions    None       Follow-up Information       Follow up With Specialties Details Why Contact Info Additional Information    Primary care doctor         Dentist         [facility] - Neurology 7th Fl Neurology In 1 week  [address]  [facility] [address] [phone] [facility] [place] - Main Building, 7th Floor Please park in [facility] and take Clinic elevator                     [1]   Social History  Tobacco Use    Smoking status: Never    Smokeless tobacco: Never   Substance Use Topics    Alcohol use: No    Drug use: No        Sam Bynum MD  07/13/25 0118

## 2025-07-13 NOTE — DISCHARGE INSTRUCTIONS
Your labs, CT scans, physical exam did not show any signs of immediately dangerous conditions.    Please follow-up with your dentist for your cracked tooth.  Avoid hard foods and have a soft diet until follow up.    A referral was placed to Neurology for your left-sided foot drop which is likely in the context of sitting for prolonged period.  If you develop any new neurologic deficits like trouble walking progressive weakness, trouble speaking, numbness, tingling, or any other new, worsening worrisome symptoms please return emergency department for re-evaluation.    Please follow up with your primary care doctor within 1 week for general medical follow up.    Please follow-up with your primary care doctor regarding this incidental finding:  Right upper lobe 2 mm solid nodule.  For a solid nodule <6 mm, Fleischner Society 2017 guidelines recommend no routine follow up for a low risk patient, or follow-up with non-contrast chest CT at 12 months in a high risk patient.

## 2025-07-15 LAB — HOLD SPECIMEN: NORMAL

## 2025-08-25 ENCOUNTER — HOSPITAL ENCOUNTER (OUTPATIENT)
Dept: RADIOLOGY | Facility: HOSPITAL | Age: 75
Discharge: HOME | End: 2025-08-25
Attending: INTERNAL MEDICINE
Payer: MEDICARE

## 2025-08-25 DIAGNOSIS — G50.1 ATYPICAL FACE PAIN: ICD-10-CM

## 2025-08-25 PROCEDURE — 70140 X-RAY EXAM OF FACIAL BONES: CPT | Mod: 26,,, | Performed by: RADIOLOGY

## 2025-08-25 PROCEDURE — 70140 X-RAY EXAM OF FACIAL BONES: CPT | Mod: TC,PN

## 2025-08-26 ENCOUNTER — OFFICE VISIT (OUTPATIENT)
Dept: GYNECOLOGIC ONCOLOGY | Facility: CLINIC | Age: 75
End: 2025-08-26
Payer: MEDICARE

## 2025-08-26 VITALS
SYSTOLIC BLOOD PRESSURE: 119 MMHG | DIASTOLIC BLOOD PRESSURE: 74 MMHG | WEIGHT: 142 LBS | HEART RATE: 68 BPM | BODY MASS INDEX: 22.24 KG/M2

## 2025-08-26 DIAGNOSIS — C54.1 LOW GRADE ENDOMETRIAL STROMAL SARCOMA OF UTERUS: ICD-10-CM

## 2025-08-26 DIAGNOSIS — Z01.818 EXAMINATION PRIOR TO CHEMOTHERAPY: ICD-10-CM

## 2025-08-26 DIAGNOSIS — Z79.811 AROMATASE INHIBITOR USE: Primary | ICD-10-CM

## 2025-08-26 PROCEDURE — 99999 PR PBB SHADOW E&M-EST. PATIENT-LVL III: CPT | Mod: PBBFAC,,, | Performed by: OBSTETRICS & GYNECOLOGY

## (undated) DEVICE — SEALER LIGASURE IMPACT 18CM

## (undated) DEVICE — UNDERGLOVE BIOGEL PI SZ 6.5 LF

## (undated) DEVICE — SEAL UNIVERSAL 5MM-8MM XI

## (undated) DEVICE — JELLY KY LUBRICATING 5G PACKET

## (undated) DEVICE — SUT PDS II 1 CT VIL MONO 36

## (undated) DEVICE — SUT VICRYL 2-0 36 CT-1

## (undated) DEVICE — INSERT CUSHIONPRONE VIEW LARGE

## (undated) DEVICE — SOL 9P NACL IRR PIC IL

## (undated) DEVICE — ELECTRODE REM PLYHSV RETURN 9

## (undated) DEVICE — SUT V-LOC 180 ABD 2/0 GS-21

## (undated) DEVICE — DEVICE TRUCLEAR ULTRA MINI

## (undated) DEVICE — SEE MEDLINE ITEM 156923

## (undated) DEVICE — SEE MEDLINE ITEM 146313

## (undated) DEVICE — GLOVE BIOGEL SKINSENSE PI 6.5

## (undated) DEVICE — SOL WATER STRL IRR 1000ML

## (undated) DEVICE — SOL IRR NACL .9% 3000ML

## (undated) DEVICE — OBTURATOR BLADELESS 8MM XI

## (undated) DEVICE — COVER TIP CURVED SCISSORS XI

## (undated) DEVICE — SET BASIN 48X48IN 6000ML RING

## (undated) DEVICE — SEE MEDLINE ITEM 152622

## (undated) DEVICE — SYR 10CC LUER LOCK

## (undated) DEVICE — SET TRI-LUMEN FILTERED TUBE

## (undated) DEVICE — PORT ACCESS 8MM W/120MM LOW

## (undated) DEVICE — PACKAGE STERILIZING Z880

## (undated) DEVICE — NDL INSUF ULTRA VERESS 120MM

## (undated) DEVICE — ADHESIVE DERMABOND ADVANCED

## (undated) DEVICE — MANIPULATOR VCARE PLUS 34MM

## (undated) DEVICE — DRESSING MEPILEX BORDR AG 4X12

## (undated) DEVICE — SOL CLEARIFY VISUALIZATION LAP

## (undated) DEVICE — IRRIGATOR ENDOSCOPY DISP.

## (undated) DEVICE — SET INFLOW TUBE HYSTER

## (undated) DEVICE — SOL ELECTROLUBE ANTI-STIC

## (undated) DEVICE — SEE MEDLINE ITEM 153151

## (undated) DEVICE — SUT MCRYL PLUS 4-0 PS2 27IN

## (undated) DEVICE — Device

## (undated) DEVICE — SEE MEDLINE ITEM 146347

## (undated) DEVICE — KIT WING PAD POSITIONING

## (undated) DEVICE — SYS LABEL CORRECT MED

## (undated) DEVICE — DRAPE COLUMN DAVINCI XI

## (undated) DEVICE — SUT PROLENE 0 CT1 30IN BLUE

## (undated) DEVICE — GLOVE BIOGEL SKINSENSE PI 6.0

## (undated) DEVICE — POSITIONER HEAD ADULT

## (undated) DEVICE — LUBRICANT SURGILUBE 2 OZ

## (undated) DEVICE — DEVICE ANC SW STAT FOLEY 6-24

## (undated) DEVICE — DRAPE ARM DAVINCI XI

## (undated) DEVICE — SOL NS 1000CC